# Patient Record
Sex: FEMALE | Race: WHITE
[De-identification: names, ages, dates, MRNs, and addresses within clinical notes are randomized per-mention and may not be internally consistent; named-entity substitution may affect disease eponyms.]

---

## 2017-03-23 ENCOUNTER — HOSPITAL ENCOUNTER (EMERGENCY)
Dept: HOSPITAL 49 - FER | Age: 37
Discharge: HOME | End: 2017-03-23
Payer: COMMERCIAL

## 2017-03-23 DIAGNOSIS — Z88.8: ICD-10-CM

## 2017-03-23 DIAGNOSIS — F32.9: ICD-10-CM

## 2017-03-23 DIAGNOSIS — F17.210: ICD-10-CM

## 2017-03-23 DIAGNOSIS — Z79.899: ICD-10-CM

## 2017-03-23 DIAGNOSIS — F41.9: ICD-10-CM

## 2017-03-23 DIAGNOSIS — N83.202: ICD-10-CM

## 2017-03-23 DIAGNOSIS — J40: Primary | ICD-10-CM

## 2017-03-23 DIAGNOSIS — Z88.2: ICD-10-CM

## 2017-03-23 DIAGNOSIS — N39.0: ICD-10-CM

## 2017-03-23 DIAGNOSIS — Z88.0: ICD-10-CM

## 2017-03-23 LAB
ALBUMIN SERPL-MCNC: 3.9 G/DL (ref 3.5–5)
ALKALINE PHOSHATASE: 58 U/L (ref 42–98)
ALT SERPL-CCNC: 12 U/L (ref 2–31)
AST: 14 U/L (ref 0–31)
BACTERIA: (no result)
BASOPHIL: 0.8 % (ref 0–2)
BILIRUBIN - TOTAL: 0.2 MG/DL (ref 0.1–1)
BILIRUBIN: NEGATIVE MG/DL
BLOOD: (no result) ERY/UL
BUN SERPL-MCNC: 8 MG/DL (ref 6–25)
BUN/CREAT RATIO (CALC): 10 (ref 12.1–20.1)
CHLORIDE: 99 MMOL/L (ref 98–107)
CLARITY UR: CLEAR
CO2 (BICARBONATE): 24 MMOL/L (ref 23–33)
COLOR: YELLOW
CREATININE: 0.8 MG/DL (ref 0.5–1)
EOSINOPHIL: 0.5 % (ref 0–5)
GLOBULIN (CALCULATION): 3.1 G/DL (ref 2.2–4.2)
GLUCOSE (U): NORMAL MG/DL
GLUCOSE SERPL-MCNC: 121 MG/DL (ref 70–105)
HCT: 44.2 % (ref 37–47)
HGB BLD-MCNC: 15.1 G/DL (ref 12.5–16)
KETONE (U): NEGATIVE MG/DL
LEUKOCYTES: NEGATIVE LEU/UL
LYMPHOCYTE: 21.3 % (ref 15–48)
MCH RBC QN AUTO: 31.6 PG (ref 25–31)
MCHC RBC AUTO-ENTMCNC: 34.2 G/DL (ref 32–36)
MCV: 92.5 FL (ref 78–100)
MONOCYTE: 16.8 % (ref 0–12)
MPV: 9.3 FL (ref 6–9.5)
MUCOUS: (no result)
NEUTROPHIL: 60.6 % (ref 41–80)
NITRITE: NEGATIVE MG/DL
PLT: 275 K/UL (ref 150–400)
POTASSIUM: 3.9 MMOL/L (ref 3.5–5.1)
PROTEIN: NEGATIVE MG/DL
RBC MORPHOLOGY: NORMAL
RBC: 4.78 M/UL (ref 4.2–5.4)
RDW: 12.3 % (ref 11.5–14)
SPECIFIC GRAVITY: 1.02 (ref 1–1.03)
TOTAL PROTEIN: 7 G/DL (ref 6.4–8.3)
URINARY RBC: (no result)
UROBILINOGEN: 0.2 MG/DL (ref 0.2–1)
WBC: 6.6 K/UL (ref 4–10.5)

## 2017-03-26 ENCOUNTER — HOSPITAL ENCOUNTER (EMERGENCY)
Dept: HOSPITAL 49 - FER | Age: 37
Discharge: HOME | End: 2017-03-26
Payer: COMMERCIAL

## 2017-03-26 DIAGNOSIS — Z88.2: ICD-10-CM

## 2017-03-26 DIAGNOSIS — I10: ICD-10-CM

## 2017-03-26 DIAGNOSIS — Z88.0: ICD-10-CM

## 2017-03-26 DIAGNOSIS — J20.9: Primary | ICD-10-CM

## 2017-03-26 DIAGNOSIS — Z79.899: ICD-10-CM

## 2017-03-26 DIAGNOSIS — Z87.891: ICD-10-CM

## 2017-03-26 DIAGNOSIS — Z88.6: ICD-10-CM

## 2017-05-14 ENCOUNTER — HOSPITAL ENCOUNTER (EMERGENCY)
Dept: HOSPITAL 49 - FER | Age: 37
Discharge: HOME | End: 2017-05-14
Payer: COMMERCIAL

## 2017-05-14 DIAGNOSIS — R10.2: Primary | ICD-10-CM

## 2017-05-14 DIAGNOSIS — Z88.0: ICD-10-CM

## 2017-05-14 DIAGNOSIS — Z87.42: ICD-10-CM

## 2017-05-14 DIAGNOSIS — R10.32: ICD-10-CM

## 2017-05-14 DIAGNOSIS — I10: ICD-10-CM

## 2017-05-14 DIAGNOSIS — Z87.442: ICD-10-CM

## 2017-05-14 DIAGNOSIS — Z88.2: ICD-10-CM

## 2017-05-14 DIAGNOSIS — R11.0: ICD-10-CM

## 2017-05-14 DIAGNOSIS — F17.210: ICD-10-CM

## 2017-05-14 DIAGNOSIS — Z88.8: ICD-10-CM

## 2017-05-14 LAB
ALBUMIN SERPL-MCNC: 4.2 G/DL (ref 3.5–5)
ALKALINE PHOSHATASE: 57 U/L (ref 42–98)
ALT SERPL-CCNC: 11 U/L (ref 2–31)
AST: 12 U/L (ref 0–31)
BASOPHIL: 0.4 % (ref 0–2)
BILIRUBIN - TOTAL: 0.2 MG/DL (ref 0.1–1)
BILIRUBIN: NEGATIVE MG/DL
BLOOD: NEGATIVE ERY/UL
BUN SERPL-MCNC: 12 MG/DL (ref 6–25)
BUN/CREAT RATIO (CALC): 15 (ref 12.1–20.1)
CHLORIDE: 98 MMOL/L (ref 98–107)
CLARITY UR: CLEAR
CO2 (BICARBONATE): 25 MMOL/L (ref 23–33)
COLOR: YELLOW
CREATININE: 0.8 MG/DL (ref 0.5–1)
EOSINOPHIL: 2.2 % (ref 0–5)
GLOBULIN (CALCULATION): 2.5 G/DL (ref 2.2–4.2)
GLUCOSE (U): NORMAL MG/DL
GLUCOSE SERPL-MCNC: 110 MG/DL (ref 70–105)
HCT: 41.8 % (ref 37–47)
HGB BLD-MCNC: 14.4 G/DL (ref 12.5–16)
KETONE (U): NEGATIVE MG/DL
LEUKOCYTES: NEGATIVE LEU/UL
LYMPHOCYTE: 27.5 % (ref 15–48)
MCH RBC QN AUTO: 31.1 PG (ref 25–31)
MCHC RBC AUTO-ENTMCNC: 34.4 G/DL (ref 32–36)
MCV: 90.3 FL (ref 78–100)
MONOCYTE: 7.9 % (ref 0–12)
MPV: 9.4 FL (ref 6–9.5)
NEUTROPHIL: 62 % (ref 41–80)
NITRITE: NEGATIVE MG/DL
PLT: 341 K/UL (ref 150–400)
POTASSIUM: 4 MMOL/L (ref 3.5–5.1)
PROTEIN: NEGATIVE MG/DL
RBC MORPHOLOGY: NORMAL
RBC: 4.63 M/UL (ref 4.2–5.4)
RDW: 12.8 % (ref 11.5–14)
SPECIFIC GRAVITY: <=1.005 (ref 1–1.03)
TOTAL PROTEIN: 6.7 G/DL (ref 6.4–8.3)
UROBILINOGEN: 0.2 MG/DL (ref 0.2–1)
WBC: 12.1 K/UL (ref 4–10.5)

## 2017-05-17 LAB
BASOPHILS # BLD AUTO: 0.03 10*3/MM3 (ref 0–0.2)
BASOPHILS NFR BLD AUTO: 0.2 % (ref 0–1.5)
BILIRUB UR QL STRIP: NEGATIVE
CLARITY UR: CLEAR
COLOR UR: YELLOW
DEPRECATED RDW RBC AUTO: 42.9 FL (ref 37–54)
EOSINOPHIL # BLD AUTO: 0.21 10*3/MM3 (ref 0–0.7)
EOSINOPHIL NFR BLD AUTO: 1.5 % (ref 0.3–6.2)
ERYTHROCYTE [DISTWIDTH] IN BLOOD BY AUTOMATED COUNT: 12.5 % (ref 11.7–13)
GLUCOSE UR STRIP-MCNC: NEGATIVE MG/DL
HCT VFR BLD AUTO: 44.1 % (ref 35.6–45.5)
HGB BLD-MCNC: 15.2 G/DL (ref 11.9–15.5)
HGB UR QL STRIP.AUTO: NEGATIVE
IMM GRANULOCYTES # BLD: 0.02 10*3/MM3 (ref 0–0.03)
IMM GRANULOCYTES NFR BLD: 0.1 % (ref 0–0.5)
KETONES UR QL STRIP: NEGATIVE
LEUKOCYTE ESTERASE UR QL STRIP.AUTO: NEGATIVE
LYMPHOCYTES # BLD AUTO: 3.85 10*3/MM3 (ref 0.9–4.8)
LYMPHOCYTES NFR BLD AUTO: 28.1 % (ref 19.6–45.3)
MCH RBC QN AUTO: 32.3 PG (ref 26.9–32)
MCHC RBC AUTO-ENTMCNC: 34.5 G/DL (ref 32.4–36.3)
MCV RBC AUTO: 93.6 FL (ref 80.5–98.2)
MONOCYTES # BLD AUTO: 0.76 10*3/MM3 (ref 0.2–1.2)
MONOCYTES NFR BLD AUTO: 5.5 % (ref 5–12)
NEUTROPHILS # BLD AUTO: 8.83 10*3/MM3 (ref 1.9–8.1)
NEUTROPHILS NFR BLD AUTO: 64.6 % (ref 42.7–76)
NITRITE UR QL STRIP: NEGATIVE
PH UR STRIP.AUTO: 7 [PH] (ref 5–8)
PLATELET # BLD AUTO: 294 10*3/MM3 (ref 140–500)
PMV BLD AUTO: 9.5 FL (ref 6–12)
PROT UR QL STRIP: NEGATIVE
RBC # BLD AUTO: 4.71 10*6/MM3 (ref 3.9–5.2)
SP GR UR STRIP: 1.01 (ref 1–1.03)
UROBILINOGEN UR QL STRIP: NORMAL
WBC NRBC COR # BLD: 13.7 10*3/MM3 (ref 4.5–10.7)

## 2017-05-17 PROCEDURE — 81003 URINALYSIS AUTO W/O SCOPE: CPT | Performed by: EMERGENCY MEDICINE

## 2017-05-17 PROCEDURE — 84703 CHORIONIC GONADOTROPIN ASSAY: CPT | Performed by: EMERGENCY MEDICINE

## 2017-05-17 PROCEDURE — 83690 ASSAY OF LIPASE: CPT | Performed by: EMERGENCY MEDICINE

## 2017-05-17 PROCEDURE — 99211 OFF/OP EST MAY X REQ PHY/QHP: CPT

## 2017-05-17 PROCEDURE — 85025 COMPLETE CBC W/AUTO DIFF WBC: CPT | Performed by: EMERGENCY MEDICINE

## 2017-05-17 PROCEDURE — 80053 COMPREHEN METABOLIC PANEL: CPT | Performed by: EMERGENCY MEDICINE

## 2017-05-17 RX ORDER — LISINOPRIL AND HYDROCHLOROTHIAZIDE 12.5; 1 MG/1; MG/1
1 TABLET ORAL DAILY
COMMUNITY
End: 2018-01-05

## 2017-05-17 RX ORDER — BUSPIRONE HYDROCHLORIDE 15 MG/1
15 TABLET ORAL 3 TIMES DAILY
COMMUNITY

## 2017-05-17 RX ORDER — PROMETHAZINE HYDROCHLORIDE 12.5 MG/1
12.5 TABLET ORAL EVERY 6 HOURS PRN
COMMUNITY
End: 2017-07-26 | Stop reason: HOSPADM

## 2017-05-17 RX ORDER — LORAZEPAM 0.5 MG/1
0.5 TABLET ORAL EVERY 8 HOURS PRN
COMMUNITY

## 2017-05-17 RX ORDER — SODIUM CHLORIDE 0.9 % (FLUSH) 0.9 %
10 SYRINGE (ML) INJECTION AS NEEDED
Status: DISCONTINUED | OUTPATIENT
Start: 2017-05-17 | End: 2017-05-18 | Stop reason: HOSPADM

## 2017-05-18 ENCOUNTER — HOSPITAL ENCOUNTER (EMERGENCY)
Facility: HOSPITAL | Age: 37
Discharge: LEFT BEFORE DISCHARGE | End: 2017-05-18
Attending: EMERGENCY MEDICINE

## 2017-05-18 ENCOUNTER — APPOINTMENT (OUTPATIENT)
Dept: CT IMAGING | Facility: HOSPITAL | Age: 37
End: 2017-05-18

## 2017-05-18 VITALS
RESPIRATION RATE: 18 BRPM | WEIGHT: 210 LBS | DIASTOLIC BLOOD PRESSURE: 78 MMHG | HEIGHT: 63 IN | SYSTOLIC BLOOD PRESSURE: 121 MMHG | HEART RATE: 81 BPM | TEMPERATURE: 98.5 F | BODY MASS INDEX: 37.21 KG/M2 | OXYGEN SATURATION: 99 %

## 2017-05-18 LAB
ALBUMIN SERPL-MCNC: 4.1 G/DL (ref 3.5–5.2)
ALBUMIN/GLOB SERPL: 1.3 G/DL
ALP SERPL-CCNC: 56 U/L (ref 39–117)
ALT SERPL W P-5'-P-CCNC: 12 U/L (ref 1–33)
ANION GAP SERPL CALCULATED.3IONS-SCNC: 10 MMOL/L
AST SERPL-CCNC: 15 U/L (ref 1–32)
BILIRUB SERPL-MCNC: 0.2 MG/DL (ref 0.1–1.2)
BUN BLD-MCNC: 12 MG/DL (ref 6–20)
BUN/CREAT SERPL: 14.8 (ref 7–25)
CALCIUM SPEC-SCNC: 9.1 MG/DL (ref 8.6–10.5)
CHLORIDE SERPL-SCNC: 98 MMOL/L (ref 98–107)
CO2 SERPL-SCNC: 27 MMOL/L (ref 22–29)
CREAT BLD-MCNC: 0.81 MG/DL (ref 0.57–1)
GFR SERPL CREATININE-BSD FRML MDRD: 79 ML/MIN/1.73
GLOBULIN UR ELPH-MCNC: 3.2 GM/DL
GLUCOSE BLD-MCNC: 106 MG/DL (ref 65–99)
HCG SERPL QL: NEGATIVE
HOLD SPECIMEN: NORMAL
HOLD SPECIMEN: NORMAL
LIPASE SERPL-CCNC: 19 U/L (ref 13–60)
POTASSIUM BLD-SCNC: 4.5 MMOL/L (ref 3.5–5.2)
PROT SERPL-MCNC: 7.3 G/DL (ref 6–8.5)
SODIUM BLD-SCNC: 135 MMOL/L (ref 136–145)
WHOLE BLOOD HOLD SPECIMEN: NORMAL
WHOLE BLOOD HOLD SPECIMEN: NORMAL

## 2017-05-18 PROCEDURE — 74177 CT ABD & PELVIS W/CONTRAST: CPT

## 2017-05-18 PROCEDURE — 0 IOPAMIDOL 61 % SOLUTION: Performed by: EMERGENCY MEDICINE

## 2017-05-18 RX ADMIN — IOPAMIDOL 85 ML: 612 INJECTION, SOLUTION INTRAVENOUS at 00:52

## 2017-05-19 ENCOUNTER — HOSPITAL ENCOUNTER (EMERGENCY)
Dept: HOSPITAL 49 - FER | Age: 37
Discharge: HOME | End: 2017-05-19
Payer: COMMERCIAL

## 2017-05-19 DIAGNOSIS — Z88.8: ICD-10-CM

## 2017-05-19 DIAGNOSIS — Z88.2: ICD-10-CM

## 2017-05-19 DIAGNOSIS — R07.81: ICD-10-CM

## 2017-05-19 DIAGNOSIS — F17.210: ICD-10-CM

## 2017-05-19 DIAGNOSIS — I10: ICD-10-CM

## 2017-05-19 DIAGNOSIS — R10.12: Primary | ICD-10-CM

## 2017-05-19 DIAGNOSIS — Z88.0: ICD-10-CM

## 2017-05-19 DIAGNOSIS — R10.32: ICD-10-CM

## 2017-05-19 DIAGNOSIS — Z79.899: ICD-10-CM

## 2017-06-05 ENCOUNTER — OFFICE VISIT (OUTPATIENT)
Dept: GASTROENTEROLOGY | Facility: CLINIC | Age: 37
End: 2017-06-05

## 2017-06-05 ENCOUNTER — TELEPHONE (OUTPATIENT)
Dept: GASTROENTEROLOGY | Facility: CLINIC | Age: 37
End: 2017-06-05

## 2017-06-05 VITALS
WEIGHT: 204.2 LBS | TEMPERATURE: 98.6 F | DIASTOLIC BLOOD PRESSURE: 82 MMHG | HEIGHT: 63 IN | BODY MASS INDEX: 36.18 KG/M2 | SYSTOLIC BLOOD PRESSURE: 138 MMHG

## 2017-06-05 DIAGNOSIS — R10.12 LEFT UPPER QUADRANT PAIN: Primary | ICD-10-CM

## 2017-06-05 LAB
BASOPHILS # BLD AUTO: 0.05 10*3/MM3 (ref 0–0.2)
BASOPHILS NFR BLD AUTO: 0.3 % (ref 0–1.5)
CRP SERPL-MCNC: 0.86 MG/DL (ref 0–0.5)
EOSINOPHIL # BLD AUTO: 0.38 10*3/MM3 (ref 0–0.7)
EOSINOPHIL NFR BLD AUTO: 2.1 % (ref 0.3–6.2)
ERYTHROCYTE [DISTWIDTH] IN BLOOD BY AUTOMATED COUNT: 13 % (ref 11.7–13)
ERYTHROCYTE [SEDIMENTATION RATE] IN BLOOD BY WESTERGREN METHOD: 4 MM/HR (ref 0–20)
HCT VFR BLD AUTO: 48.4 % (ref 35.6–45.5)
HGB BLD-MCNC: 16.4 G/DL (ref 11.9–15.5)
IMM GRANULOCYTES # BLD: 0.04 10*3/MM3 (ref 0–0.03)
IMM GRANULOCYTES NFR BLD: 0.2 % (ref 0–0.5)
LYMPHOCYTES # BLD AUTO: 3.29 10*3/MM3 (ref 0.9–4.8)
LYMPHOCYTES NFR BLD AUTO: 17.9 % (ref 19.6–45.3)
MCH RBC QN AUTO: 31.8 PG (ref 26.9–32)
MCHC RBC AUTO-ENTMCNC: 33.9 G/DL (ref 32.4–36.3)
MCV RBC AUTO: 93.8 FL (ref 80.5–98.2)
MONOCYTES # BLD AUTO: 1.01 10*3/MM3 (ref 0.2–1.2)
MONOCYTES NFR BLD AUTO: 5.5 % (ref 5–12)
NEUTROPHILS # BLD AUTO: 13.65 10*3/MM3 (ref 1.9–8.1)
NEUTROPHILS NFR BLD AUTO: 74 % (ref 42.7–76)
PLATELET # BLD AUTO: 400 10*3/MM3 (ref 140–500)
RBC # BLD AUTO: 5.16 10*6/MM3 (ref 3.9–5.2)
WBC # BLD AUTO: 18.42 10*3/MM3 (ref 4.5–10.7)

## 2017-06-05 PROCEDURE — 99204 OFFICE O/P NEW MOD 45 MIN: CPT | Performed by: INTERNAL MEDICINE

## 2017-06-05 RX ORDER — HYOSCYAMINE SULFATE 0.125 MG
0.12 TABLET ORAL EVERY 6 HOURS PRN
Qty: 120 TABLET | Refills: 5 | Status: SHIPPED | OUTPATIENT
Start: 2017-06-05 | End: 2017-07-26 | Stop reason: HOSPADM

## 2017-06-05 RX ORDER — AMOXICILLIN AND CLAVULANATE POTASSIUM 875; 125 MG/1; MG/1
TABLET, FILM COATED ORAL
COMMUNITY
Start: 2017-05-27 | End: 2017-07-06

## 2017-06-05 NOTE — TELEPHONE ENCOUNTER
----- Message from Cherrie Lazaro MD sent at 6/5/2017 11:59 AM EDT -----  She had ER visit to [facility] recently with xrays and scans-- can we please get the records, thanks

## 2017-06-05 NOTE — PATIENT INSTRUCTIONS
Schedule the GI x ray test    I will get records from [facility]    Start the hyoscyamine as directed    For any additional questions, concerns or changes to your condition after today's office visit please contact the office at [phone].

## 2017-06-05 NOTE — PROGRESS NOTES
"Chief Complaint   Patient presents with   • Abdominal Pain   • Vomiting     x 6 weeks   • Nausea       Subjective     HPI    Terra Deutsch is a 36 y.o. female with a past medical history noted below who presents for evaluation of abdominal pain.  Pain located At the left side of her abdomen diffusely with a more focused area in the mid abdomen.  Present for about 1 month.  She had just had a D and C for heavy periods.  Pain is constant, fluctuating in severity in waves up to a 10/10.  Described as sharp.  Worse with movement.  No change with eating.  No change with BMs.  Pain can make her sick to her stomach.  BMs alternating between diarrhea and constipation.  No blood in her stool.  No problems urinating.    Had CT imaging 5/18, notable for L ovarian cyst [facility].  Labs notable only for a mildly elevated white count at 11, the remainder of her CBC and her CMP were normal.  Also seen at [facility] and had CT scans and imaging, she thinks this was May 19th and reports a \"knot\" on her left side and she was told that she needed an upper GI.    She reports that she has seen her gynecologist since the D and C.  She reports that her pelvic exam was reportedly normal.  However she does endorse some tenderness in her uterine area.  She is to have a total hysterectomy later in July.  She denies fevers or chills.    Her weight is stable.  Eating soups, avoiding spicy foods.      Has had a tubal, no other abdominal surgeries.  No family history of GI malignancy.  Smoking 5 cigarettes per day.  No ETOH.  Stay at home mother.        Past Medical History:   Diagnosis Date   • Abnormal CT of the abdomen 05/18/2017    1.8cm ovarian cyst   • Anxiety    • GERD (gastroesophageal reflux disease)    • Hypertension    • Ovarian cyst          Current Outpatient Prescriptions:   •  amoxicillin-clavulanate (AUGMENTIN) 875-125 MG per tablet, , Disp: , Rfl:   •  busPIRone (BUSPAR) 15 MG tablet, Take 15 mg by mouth 2 (Two) Times a Day., Disp: , " Rfl:   •  lisinopril-hydrochlorothiazide (PRINZIDE,ZESTORETIC) 10-12.5 MG per tablet, Take 1 tablet by mouth Daily., Disp: , Rfl:   •  LORazepam (ATIVAN) 0.5 MG tablet, Take 0.5 mg by mouth As Needed for Anxiety (1-2 tabs TID)., Disp: , Rfl:   •  PROAIR  (90 BASE) MCG/ACT inhaler, , Disp: , Rfl:   •  promethazine (PHENERGAN) 12.5 MG tablet, Take 12.5 mg by mouth Every 6 (Six) Hours As Needed for Nausea or Vomiting., Disp: , Rfl:   •  hyoscyamine (ANASPAZ,LEVSIN) 0.125 MG tablet, Take 1 tablet by mouth Every 6 (Six) Hours As Needed for Cramping., Disp: 120 tablet, Rfl: 5    Allergies   Allergen Reactions   • Bactrim [Sulfamethoxazole-Trimethoprim]    • Penicillins        Social History     Social History   • Marital status:      Spouse name: N/A   • Number of children: N/A   • Years of education: N/A     Occupational History   • Not on file.     Social History Main Topics   • Smoking status: Current Every Day Smoker     Packs/day: 0.25     Years: 14.00     Types: Cigarettes     Start date: 2000   • Smokeless tobacco: Not on file   • Alcohol use No   • Drug use: No   • Sexual activity: Yes     Partners: Male     Other Topics Concern   • Not on file     Social History Narrative       No family history on file.    Review of Systems   Constitutional: Negative for activity change, appetite change and fatigue.   HENT: Negative for sore throat and trouble swallowing.    Respiratory: Negative.    Cardiovascular: Negative.    Gastrointestinal: Positive for abdominal pain and nausea. Negative for abdominal distention, blood in stool and vomiting.   Endocrine: Negative for cold intolerance and heat intolerance.   Genitourinary: Positive for menstrual problem. Negative for difficulty urinating, dysuria and frequency.   Musculoskeletal: Negative for arthralgias, back pain and myalgias.   Skin: Negative.    Hematological: Negative for adenopathy. Does not bruise/bleed easily.   All other systems reviewed and are  negative.      Objective     Vitals:    06/05/17 1019   BP: 138/82   Temp: 98.6 °F (37 °C)     Last 2 weights    06/05/17  1019   Weight: 204 lb 3.2 oz (92.6 kg)     Body mass index is 36.17 kg/(m^2).    Physical Exam   Constitutional: She is oriented to person, place, and time. She appears well-developed and well-nourished. No distress.   HENT:   Head: Normocephalic and atraumatic.   Right Ear: External ear normal.   Left Ear: External ear normal.   Nose: Nose normal.   Mouth/Throat: Oropharynx is clear and moist.   Eyes: Conjunctivae and EOM are normal. Right eye exhibits no discharge. Left eye exhibits no discharge. No scleral icterus.   Neck: Normal range of motion. Neck supple. No thyromegaly present.   No supraclavicular adenopathy   Cardiovascular: Normal rate, regular rhythm, normal heart sounds and intact distal pulses.  Exam reveals no gallop.    No murmur heard.  No lower extremity edema   Pulmonary/Chest: Effort normal and breath sounds normal. No respiratory distress. She has no wheezes.   Abdominal: Soft. Normal appearance and bowel sounds are normal. She exhibits no distension and no mass. There is no hepatosplenomegaly. There is tenderness. There is no rigidity, no rebound and no guarding. No hernia.   Diffuse tenderness on the left abdomen no rebound or guarding   Genitourinary:   Genitourinary Comments: Rectal exam deferred   Musculoskeletal: Normal range of motion. She exhibits no edema or tenderness.   No atrophy of upper or lower extremities.  Normal digits and nails of both hands.   Lymphadenopathy:     She has no cervical adenopathy.   Neurological: She is alert and oriented to person, place, and time. She displays no atrophy. Coordination normal.   Skin: Skin is warm and dry. No rash noted. She is not diaphoretic. No erythema.   Psychiatric: She has a normal mood and affect. Her behavior is normal. Judgment and thought content normal.   Vitals reviewed.      WBC   Date Value Ref Range Status    05/17/2017 13.70 (H) 4.50 - 10.70 10*3/mm3 Final     RBC   Date Value Ref Range Status   05/17/2017 4.71 3.90 - 5.20 10*6/mm3 Final     Hemoglobin   Date Value Ref Range Status   05/17/2017 15.2 11.9 - 15.5 g/dL Final     Hematocrit   Date Value Ref Range Status   05/17/2017 44.1 35.6 - 45.5 % Final     MCV   Date Value Ref Range Status   05/17/2017 93.6 80.5 - 98.2 fL Final     MCH   Date Value Ref Range Status   05/17/2017 32.3 (H) 26.9 - 32.0 pg Final     MCHC   Date Value Ref Range Status   05/17/2017 34.5 32.4 - 36.3 g/dL Final     RDW   Date Value Ref Range Status   05/17/2017 12.5 11.7 - 13.0 % Final     RDW-SD   Date Value Ref Range Status   05/17/2017 42.9 37.0 - 54.0 fl Final     MPV   Date Value Ref Range Status   05/17/2017 9.5 6.0 - 12.0 fL Final     Platelets   Date Value Ref Range Status   05/17/2017 294 140 - 500 10*3/mm3 Final     Neutrophil %   Date Value Ref Range Status   05/17/2017 64.6 42.7 - 76.0 % Final     Lymphocyte %   Date Value Ref Range Status   05/17/2017 28.1 19.6 - 45.3 % Final     Monocyte %   Date Value Ref Range Status   05/17/2017 5.5 5.0 - 12.0 % Final     Eosinophil %   Date Value Ref Range Status   05/17/2017 1.5 0.3 - 6.2 % Final     Basophil %   Date Value Ref Range Status   05/17/2017 0.2 0.0 - 1.5 % Final     Immature Grans %   Date Value Ref Range Status   05/17/2017 0.1 0.0 - 0.5 % Final     Neutrophils, Absolute   Date Value Ref Range Status   05/17/2017 8.83 (H) 1.90 - 8.10 10*3/mm3 Final     Lymphocytes, Absolute   Date Value Ref Range Status   05/17/2017 3.85 0.90 - 4.80 10*3/mm3 Final     Monocytes, Absolute   Date Value Ref Range Status   05/17/2017 0.76 0.20 - 1.20 10*3/mm3 Final     Eosinophils, Absolute   Date Value Ref Range Status   05/17/2017 0.21 0.00 - 0.70 10*3/mm3 Final     Basophils, Absolute   Date Value Ref Range Status   05/17/2017 0.03 0.00 - 0.20 10*3/mm3 Final     Immature Grans, Absolute   Date Value Ref Range Status   05/17/2017 0.02 0.00 -  0.03 10*3/mm3 Final       Glucose   Date Value Ref Range Status   05/17/2017 106 (H) 65 - 99 mg/dL Final     Sodium   Date Value Ref Range Status   05/17/2017 135 (L) 136 - 145 mmol/L Final     Potassium   Date Value Ref Range Status   05/17/2017 4.5 3.5 - 5.2 mmol/L Final     CO2   Date Value Ref Range Status   05/17/2017 27.0 22.0 - 29.0 mmol/L Final     Chloride   Date Value Ref Range Status   05/17/2017 98 98 - 107 mmol/L Final     Anion Gap   Date Value Ref Range Status   05/17/2017 10.0 mmol/L Final     Creatinine   Date Value Ref Range Status   05/17/2017 0.81 0.57 - 1.00 mg/dL Final     BUN   Date Value Ref Range Status   05/17/2017 12 6 - 20 mg/dL Final     BUN/Creatinine Ratio   Date Value Ref Range Status   05/17/2017 14.8 7.0 - 25.0 Final     Calcium   Date Value Ref Range Status   05/17/2017 9.1 8.6 - 10.5 mg/dL Final     eGFR Non  Amer   Date Value Ref Range Status   05/17/2017 79 >60 mL/min/1.73 Final     Alkaline Phosphatase   Date Value Ref Range Status   05/17/2017 56 39 - 117 U/L Final     Total Protein   Date Value Ref Range Status   05/17/2017 7.3 6.0 - 8.5 g/dL Final     ALT (SGPT)   Date Value Ref Range Status   05/17/2017 12 1 - 33 U/L Final     AST (SGOT)   Date Value Ref Range Status   05/17/2017 15 1 - 32 U/L Final     Total Bilirubin   Date Value Ref Range Status   05/17/2017 0.2 0.1 - 1.2 mg/dL Final     Albumin   Date Value Ref Range Status   05/17/2017 4.10 3.50 - 5.20 g/dL Final     Globulin   Date Value Ref Range Status   05/17/2017 3.2 gm/dL Final     A/G Ratio   Date Value Ref Range Status   05/17/2017 1.3 g/dL Final         Imaging Results (last 7 days)     ** No results found for the last 168 hours. **            No notes on file    Assessment/Plan    1. Left abdominal pain: Present for about a month.  Interestingly this is been following her D&C.  Normal imaging of the GI tract, she did have a left-sided ovarian cyst.  Normal blood work from May 17.  Differential of  "gynecologic pain versus cramping versus colitis versus other    Plan:  -CBC today to make sure that her white count has resolved  -CRP and ESR to check inflammation  -Trial of hyoscyamine to see if this helps with her pain  -Upper GI with small bowel follow-through for further evaluation of her GI tract  -Get the records from [facility] to see what the CT imaging showed given that she is reporting the findings of a \"knot\"    Terra was seen today for abdominal pain, vomiting and nausea.    Diagnoses and all orders for this visit:    Left upper quadrant pain  -     FL Upper GI Single Contrast SBFT; Future  -     hyoscyamine (ANASPAZ,LEVSIN) 0.125 MG tablet; Take 1 tablet by mouth Every 6 (Six) Hours As Needed for Cramping.  -     CBC & Differential  -     C-reactive Protein  -     Sedimentation Rate        I have discussed the above plan with the patient.  They verbalize understanding and are in agreement with the plan.  They have been advised to contact the office for any questions, concerns, or changes related to their health.    Dictated utilizing Dragon dictation  "

## 2017-06-05 NOTE — TELEPHONE ENCOUNTER
Called [facility] at [phone] and on vm left message requesting records from recent ER records be faxed to [phone].

## 2017-06-06 NOTE — PROGRESS NOTES
And spoke with her regarding the increase in her white blood count as well as elevated CRP.  She has been on Augmentin for upper respiratory infection for the past 2 weeks.  She reported having some nausea and symptoms of gastroenteritis starting yesterday.  She denies any fevers chills.  She denies feeling worse.    I have advised her to follow-up with her primary care physician, Dr. Cain later this week for repeat CBC to make sure that her WBC returns to normal.  If she feels worse I have advised her to go to urgent care versus ER.    Will check in with her later this week.  If she still is having diarrhea, then I think stool testing for C. difficile is warranted

## 2017-06-07 ENCOUNTER — TELEPHONE (OUTPATIENT)
Dept: GASTROENTEROLOGY | Facility: CLINIC | Age: 37
End: 2017-06-07

## 2017-06-07 NOTE — TELEPHONE ENCOUNTER
----- Message from Cherrie Lazaro MD sent at 6/6/2017  5:09 PM EDT -----  And spoke with her regarding the increase in her white blood count as well as elevated CRP.  She has been on Augmentin for upper respiratory infection for the past 2 weeks.  She reported having some nausea and symptoms of gastroenteritis starting yesterday.  She denies any fevers chills.  She denies feeling worse.    I have advised her to follow-up with her primary care physician, Dr. Cain later this week for repeat CBC to make sure that her WBC returns to normal.  If she feels worse I have advised her to go to urgent care versus ER.    Will check in with her later this week.  If she still is having diarrhea, then I think stool testing for C. difficile is warranted

## 2017-07-06 ENCOUNTER — OFFICE VISIT (OUTPATIENT)
Dept: GASTROENTEROLOGY | Facility: CLINIC | Age: 37
End: 2017-07-06

## 2017-07-06 VITALS
SYSTOLIC BLOOD PRESSURE: 136 MMHG | DIASTOLIC BLOOD PRESSURE: 82 MMHG | BODY MASS INDEX: 36.86 KG/M2 | WEIGHT: 208 LBS | HEIGHT: 63 IN

## 2017-07-06 DIAGNOSIS — D72.829 LEUKOCYTOSIS, UNSPECIFIED TYPE: ICD-10-CM

## 2017-07-06 DIAGNOSIS — R11.0 NAUSEA: ICD-10-CM

## 2017-07-06 DIAGNOSIS — R10.30 LOWER ABDOMINAL PAIN: Primary | ICD-10-CM

## 2017-07-06 LAB
BASOPHILS # BLD AUTO: 0.04 10*3/MM3 (ref 0–0.2)
BASOPHILS NFR BLD AUTO: 0.3 % (ref 0–1.5)
EOSINOPHIL # BLD AUTO: 0.11 10*3/MM3 (ref 0–0.7)
EOSINOPHIL NFR BLD AUTO: 0.8 % (ref 0.3–6.2)
ERYTHROCYTE [DISTWIDTH] IN BLOOD BY AUTOMATED COUNT: 13.3 % (ref 11.7–13)
HCT VFR BLD AUTO: 44.3 % (ref 35.6–45.5)
HGB BLD-MCNC: 14.5 G/DL (ref 11.9–15.5)
IMM GRANULOCYTES # BLD: 0.02 10*3/MM3 (ref 0–0.03)
IMM GRANULOCYTES NFR BLD: 0.1 % (ref 0–0.5)
LYMPHOCYTES # BLD AUTO: 2.45 10*3/MM3 (ref 0.9–4.8)
LYMPHOCYTES NFR BLD AUTO: 18.4 % (ref 19.6–45.3)
MCH RBC QN AUTO: 31.3 PG (ref 26.9–32)
MCHC RBC AUTO-ENTMCNC: 32.7 G/DL (ref 32.4–36.3)
MCV RBC AUTO: 95.7 FL (ref 80.5–98.2)
MONOCYTES # BLD AUTO: 0.55 10*3/MM3 (ref 0.2–1.2)
MONOCYTES NFR BLD AUTO: 4.1 % (ref 5–12)
NEUTROPHILS # BLD AUTO: 10.18 10*3/MM3 (ref 1.9–8.1)
NEUTROPHILS NFR BLD AUTO: 76.3 % (ref 42.7–76)
PLATELET # BLD AUTO: 370 10*3/MM3 (ref 140–500)
RBC # BLD AUTO: 4.63 10*6/MM3 (ref 3.9–5.2)
WBC # BLD AUTO: 13.35 10*3/MM3 (ref 4.5–10.7)

## 2017-07-06 PROCEDURE — 99214 OFFICE O/P EST MOD 30 MIN: CPT | Performed by: INTERNAL MEDICINE

## 2017-07-06 RX ORDER — SODIUM CHLORIDE 0.9 % (FLUSH) 0.9 %
1-10 SYRINGE (ML) INJECTION AS NEEDED
Status: CANCELLED | OUTPATIENT
Start: 2017-07-06

## 2017-07-06 RX ORDER — SODIUM CHLORIDE, SODIUM LACTATE, POTASSIUM CHLORIDE, CALCIUM CHLORIDE 600; 310; 30; 20 MG/100ML; MG/100ML; MG/100ML; MG/100ML
30 INJECTION, SOLUTION INTRAVENOUS CONTINUOUS
Status: CANCELLED | OUTPATIENT
Start: 2017-07-06

## 2017-07-06 NOTE — PATIENT INSTRUCTIONS
Schedule the scope tests    Labs today    Follow up with Dr Cain re: infectious symptoms    For any additional questions, concerns or changes to your condition after today's office visit please contact the office at [phone].

## 2017-07-06 NOTE — PROGRESS NOTES
Chief Complaint   Patient presents with   • Follow-up     Subjective     HPI  Terra Deutsch is a 36 y.o. female who presents for follow up of lower abdominal pain following a D and C.  May CT imaging notable for a left ovarian cyst.  I started her on Levsin for symptoms. She reports that the left lower abdominal pain persists. Occurring daily.  Intermittent.  Can be as severe as a 10/10.  Currently about a 4/10 which is tolerable to her.  Plans for hysterectomy 7/24/2017.  She has not had any further periods.  The levsin helps a bit for severe symptoms but she is concerned that the pain is persisting.    Her CBC on 6/5 was notable for a WBC of 18. She was on augmentin for URI at that time. I advised she see her pcp, which she did following that lab visit and her WBC was a bit lower.  She took another 2 weeks of antibiotics.  She denies any diarrhea.  She persists with nausea but no vomiting.  She does endorse fatigue. No UTI symptoms.   She feels that she is getting some kind of illness but cannot pinpoint the symptoms to any particular location.    She is quite upset about her symptoms and wishes to proceed with endoscopic workup for the symptoms.    Past Medical History:   Diagnosis Date   • Abnormal CT of the abdomen 05/18/2017    1.8cm ovarian cyst   • Anxiety    • GERD (gastroesophageal reflux disease)    • Hypertension    • Ovarian cyst        Social History     Social History   • Marital status:      Spouse name: N/A   • Number of children: N/A   • Years of education: N/A     Social History Main Topics   • Smoking status: Current Every Day Smoker     Packs/day: 0.25     Years: 14.00     Types: Cigarettes     Start date: 2000   • Smokeless tobacco: None   • Alcohol use No   • Drug use: No   • Sexual activity: Yes     Partners: Male     Other Topics Concern   • None     Social History Narrative         Current Outpatient Prescriptions:   •  busPIRone (BUSPAR) 15 MG tablet, Take 15 mg by mouth 2 (Two) Times  a Day., Disp: , Rfl:   •  hyoscyamine (ANASPAZ,LEVSIN) 0.125 MG tablet, Take 1 tablet by mouth Every 6 (Six) Hours As Needed for Cramping., Disp: 120 tablet, Rfl: 5  •  lisinopril-hydrochlorothiazide (PRINZIDE,ZESTORETIC) 10-12.5 MG per tablet, Take 1 tablet by mouth Daily., Disp: , Rfl:   •  LORazepam (ATIVAN) 0.5 MG tablet, Take 0.5 mg by mouth As Needed for Anxiety (1-2 tabs TID)., Disp: , Rfl:   •  PROAIR  (90 BASE) MCG/ACT inhaler, , Disp: , Rfl:   •  promethazine (PHENERGAN) 12.5 MG tablet, Take 12.5 mg by mouth Every 6 (Six) Hours As Needed for Nausea or Vomiting., Disp: , Rfl:     Review of Systems   Constitutional: Positive for fatigue. Negative for activity change and appetite change.   HENT: Negative for congestion, sore throat and trouble swallowing.    Respiratory: Negative.    Cardiovascular: Negative.    Gastrointestinal: Positive for abdominal pain and nausea. Negative for abdominal distention, blood in stool, constipation, diarrhea and vomiting.   Endocrine: Negative for cold intolerance and heat intolerance.   Genitourinary: Negative for difficulty urinating, dysuria and frequency.   Musculoskeletal: Negative for arthralgias, back pain and myalgias.   Skin: Negative.    Hematological: Negative for adenopathy. Does not bruise/bleed easily.   All other systems reviewed and are negative.      Objective   Vitals:    07/06/17 0833   BP: 136/82     Last Weight    07/06/17  0833   Weight: 208 lb (94.3 kg)     Body mass index is 36.85 kg/(m^2).      Physical Exam   Constitutional: She is oriented to person, place, and time. She appears well-developed and well-nourished. No distress.   HENT:   Head: Normocephalic and atraumatic.   Right Ear: External ear normal.   Left Ear: External ear normal.   Nose: Nose normal.   Eyes: Conjunctivae and EOM are normal. No scleral icterus.   Cardiovascular: Normal rate, regular rhythm, normal heart sounds and intact distal pulses.  Exam reveals no gallop.    No  murmur heard.  No lower extremity edema   Pulmonary/Chest: Effort normal. No respiratory distress. She has no wheezes.   Coarse breath sounds bilaterally   Abdominal: Soft. Normal appearance and bowel sounds are normal. She exhibits no distension and no mass. There is no hepatosplenomegaly. There is tenderness. There is no rigidity, no rebound and no guarding. No hernia.   llq tenderness   Genitourinary:   Genitourinary Comments: Rectal exam deferred   Musculoskeletal: Normal range of motion. She exhibits no edema or tenderness.   Normal digits and nails of both hands.  No atrophy or wasting of upper or lower extremeties   Neurological: She is alert and oriented to person, place, and time. She displays no atrophy. Coordination normal.   Skin: Skin is warm and dry. No rash noted. She is not diaphoretic. No erythema.   Psychiatric: She has a normal mood and affect. Her behavior is normal. Judgment and thought content normal.   Vitals reviewed.      WBC   Date Value Ref Range Status   06/05/2017 18.42 (H) 4.50 - 10.70 10*3/mm3 Final   05/17/2017 13.70 (H) 4.50 - 10.70 10*3/mm3 Final     RBC   Date Value Ref Range Status   06/05/2017 5.16 3.90 - 5.20 10*6/mm3 Final   05/17/2017 4.71 3.90 - 5.20 10*6/mm3 Final     Hemoglobin   Date Value Ref Range Status   06/05/2017 16.4 (H) 11.9 - 15.5 g/dL Final   05/17/2017 15.2 11.9 - 15.5 g/dL Final     Hematocrit   Date Value Ref Range Status   06/05/2017 48.4 (H) 35.6 - 45.5 % Final   05/17/2017 44.1 35.6 - 45.5 % Final     MCV   Date Value Ref Range Status   06/05/2017 93.8 80.5 - 98.2 fL Final   05/17/2017 93.6 80.5 - 98.2 fL Final     MCH   Date Value Ref Range Status   06/05/2017 31.8 26.9 - 32.0 pg Final   05/17/2017 32.3 (H) 26.9 - 32.0 pg Final     MCHC   Date Value Ref Range Status   06/05/2017 33.9 32.4 - 36.3 g/dL Final   05/17/2017 34.5 32.4 - 36.3 g/dL Final     RDW   Date Value Ref Range Status   06/05/2017 13.0 11.7 - 13.0 % Final   05/17/2017 12.5 11.7 - 13.0 %  Final     RDW-SD   Date Value Ref Range Status   05/17/2017 42.9 37.0 - 54.0 fl Final     MPV   Date Value Ref Range Status   05/17/2017 9.5 6.0 - 12.0 fL Final     Platelets   Date Value Ref Range Status   06/05/2017 400 140 - 500 10*3/mm3 Final   05/17/2017 294 140 - 500 10*3/mm3 Final     Neutrophil %   Date Value Ref Range Status   05/17/2017 64.6 42.7 - 76.0 % Final     Neutrophil Rel %   Date Value Ref Range Status   06/05/2017 74.0 42.7 - 76.0 % Final     Lymphocyte %   Date Value Ref Range Status   05/17/2017 28.1 19.6 - 45.3 % Final     Lymphocyte Rel %   Date Value Ref Range Status   06/05/2017 17.9 (L) 19.6 - 45.3 % Final     Monocyte %   Date Value Ref Range Status   05/17/2017 5.5 5.0 - 12.0 % Final     Monocyte Rel %   Date Value Ref Range Status   06/05/2017 5.5 5.0 - 12.0 % Final     Eosinophil %   Date Value Ref Range Status   05/17/2017 1.5 0.3 - 6.2 % Final     Eosinophil Rel %   Date Value Ref Range Status   06/05/2017 2.1 0.3 - 6.2 % Final     Basophil %   Date Value Ref Range Status   05/17/2017 0.2 0.0 - 1.5 % Final     Basophil Rel %   Date Value Ref Range Status   06/05/2017 0.3 0.0 - 1.5 % Final     Immature Grans %   Date Value Ref Range Status   05/17/2017 0.1 0.0 - 0.5 % Final     Neutrophils, Absolute   Date Value Ref Range Status   05/17/2017 8.83 (H) 1.90 - 8.10 10*3/mm3 Final     Neutrophils Absolute   Date Value Ref Range Status   06/05/2017 13.65 (H) 1.90 - 8.10 10*3/mm3 Final     Lymphocytes, Absolute   Date Value Ref Range Status   05/17/2017 3.85 0.90 - 4.80 10*3/mm3 Final     Lymphocytes Absolute   Date Value Ref Range Status   06/05/2017 3.29 0.90 - 4.80 10*3/mm3 Final     Monocytes, Absolute   Date Value Ref Range Status   05/17/2017 0.76 0.20 - 1.20 10*3/mm3 Final     Monocytes Absolute   Date Value Ref Range Status   06/05/2017 1.01 0.20 - 1.20 10*3/mm3 Final     Eosinophils, Absolute   Date Value Ref Range Status   05/17/2017 0.21 0.00 - 0.70 10*3/mm3 Final     Eosinophils  Absolute   Date Value Ref Range Status   06/05/2017 0.38 0.00 - 0.70 10*3/mm3 Final     Basophils, Absolute   Date Value Ref Range Status   05/17/2017 0.03 0.00 - 0.20 10*3/mm3 Final     Basophils Absolute   Date Value Ref Range Status   06/05/2017 0.05 0.00 - 0.20 10*3/mm3 Final     Immature Grans, Absolute   Date Value Ref Range Status   05/17/2017 0.02 0.00 - 0.03 10*3/mm3 Final       Lab Results   Component Value Date    GLUCOSE 106 (H) 05/17/2017    BUN 12 05/17/2017    CREATININE 0.81 05/17/2017    EGFRIFNONA 79 05/17/2017    BCR 14.8 05/17/2017    CO2 27.0 05/17/2017    CALCIUM 9.1 05/17/2017    ALBUMIN 4.10 05/17/2017    LABIL2 1.3 05/17/2017    AST 15 05/17/2017    ALT 12 05/17/2017         Imaging Results (last 7 days)     ** No results found for the last 168 hours. **            Assessment/Plan    1. LLQ pain: persistent, unchanged.  The only finding on recent CT imaging is a left ovarian cyst.  That was in early May and the likelihood of this persisting and causing her pain over the past 2 months is small.  She is quite adamant about pursuing endoscopic evaluation  2. Nausea: persistent issue, taking prn phenergan  3. Leukocytosis: she has been on antibiotics, no localizing infectious symptoms    Plan  EGD and colonoscopy for workup of nausea and left lower quadrant pain we discussed the procedures, the bowel preparation, the risks and the alternatives and she is quite adamant about proceeding with these tests.  she is to continue that as needed Levsin  I advised her to follow-up with Dr. Cain, her PCP regarding her infectious symptoms  We will repeat CBC today to see where her white cell count is    Terra was seen today for follow-up.    Diagnoses and all orders for this visit:    Lower abdominal pain  -     Case Request; Standing  -     sodium chloride 0.9 % flush 1-10 mL; Infuse 1-10 mL into a venous catheter As Needed for Line Care.  -     lactated ringers infusion; Infuse 30 mL/hr into a venous  catheter Continuous.  -     Case Request    Leukocytosis, unspecified type  -     CBC & Differential    Nausea  -     Case Request; Standing  -     sodium chloride 0.9 % flush 1-10 mL; Infuse 1-10 mL into a venous catheter As Needed for Line Care.  -     lactated ringers infusion; Infuse 30 mL/hr into a venous catheter Continuous.  -     Case Request    Other orders  -     Implement Anesthesia Orders Day of Procedure; Standing  -     Obtain Informed Consent; Standing  -     Insert Peripheral IV; Standing  -     Saline Lock & Maintain IV Access; Standing        Dictated utilizing Dragon dictation

## 2017-07-11 ENCOUNTER — TELEPHONE (OUTPATIENT)
Dept: GASTROENTEROLOGY | Facility: CLINIC | Age: 37
End: 2017-07-11

## 2017-07-11 RX ORDER — MELATONIN
2000 DAILY
COMMUNITY

## 2017-07-11 RX ORDER — IBUPROFEN 200 MG
200 TABLET ORAL EVERY 6 HOURS PRN
COMMUNITY
End: 2017-07-26 | Stop reason: HOSPADM

## 2017-07-11 NOTE — TELEPHONE ENCOUNTER
----- Message from Cherrie Lazaro MD sent at 7/7/2017  1:28 PM EDT -----  Her white cells remain slightly high, but much improved from previously and the same as in May.  If she develops further infectious symptoms, she needs to see Dr Cain again. Otherwise, she will need to be seen by the hematologists

## 2017-07-11 NOTE — TELEPHONE ENCOUNTER
Call to pt.  Advise per Dr Lazaro the white cells remain slightly  High, but much improved from previously and the same as in May.  If develops further infectious symptoms, needs to see Dr Cain again.  Otherwise, will need to be seen by Hematologists.  Pt verb understanding.

## 2017-07-12 ENCOUNTER — HOSPITAL ENCOUNTER (OUTPATIENT)
Facility: HOSPITAL | Age: 37
Setting detail: HOSPITAL OUTPATIENT SURGERY
Discharge: HOME | End: 2017-07-12
Attending: INTERNAL MEDICINE | Admitting: INTERNAL MEDICINE

## 2017-07-12 ENCOUNTER — ANESTHESIA EVENT (OUTPATIENT)
Dept: GASTROENTEROLOGY | Facility: HOSPITAL | Age: 37
End: 2017-07-12

## 2017-07-12 ENCOUNTER — ANESTHESIA (OUTPATIENT)
Dept: GASTROENTEROLOGY | Facility: HOSPITAL | Age: 37
End: 2017-07-12

## 2017-07-12 VITALS
OXYGEN SATURATION: 98 % | DIASTOLIC BLOOD PRESSURE: 79 MMHG | SYSTOLIC BLOOD PRESSURE: 110 MMHG | TEMPERATURE: 98.1 F | HEART RATE: 72 BPM | WEIGHT: 201.8 LBS | RESPIRATION RATE: 16 BRPM | HEIGHT: 64 IN | BODY MASS INDEX: 34.45 KG/M2

## 2017-07-12 DIAGNOSIS — R10.30 LOWER ABDOMINAL PAIN: ICD-10-CM

## 2017-07-12 DIAGNOSIS — R11.0 NAUSEA: ICD-10-CM

## 2017-07-12 LAB
B-HCG UR QL: NEGATIVE
INTERNAL NEGATIVE CONTROL: NEGATIVE
INTERNAL POSITIVE CONTROL: POSITIVE
Lab: NORMAL

## 2017-07-12 PROCEDURE — 25010000002 PROPOFOL 10 MG/ML EMULSION: Performed by: ANESTHESIOLOGY

## 2017-07-12 PROCEDURE — 45380 COLONOSCOPY AND BIOPSY: CPT | Performed by: INTERNAL MEDICINE

## 2017-07-12 PROCEDURE — 88305 TISSUE EXAM BY PATHOLOGIST: CPT | Performed by: INTERNAL MEDICINE

## 2017-07-12 PROCEDURE — 43239 EGD BIOPSY SINGLE/MULTIPLE: CPT | Performed by: INTERNAL MEDICINE

## 2017-07-12 PROCEDURE — 88312 SPECIAL STAINS GROUP 1: CPT | Performed by: INTERNAL MEDICINE

## 2017-07-12 RX ORDER — PROPOFOL 10 MG/ML
VIAL (ML) INTRAVENOUS AS NEEDED
Status: DISCONTINUED | OUTPATIENT
Start: 2017-07-12 | End: 2017-07-12 | Stop reason: SURG

## 2017-07-12 RX ORDER — SODIUM CHLORIDE, SODIUM LACTATE, POTASSIUM CHLORIDE, CALCIUM CHLORIDE 600; 310; 30; 20 MG/100ML; MG/100ML; MG/100ML; MG/100ML
30 INJECTION, SOLUTION INTRAVENOUS CONTINUOUS
Status: DISCONTINUED | OUTPATIENT
Start: 2017-07-12 | End: 2017-07-12 | Stop reason: HOSPADM

## 2017-07-12 RX ORDER — PROPOFOL 10 MG/ML
VIAL (ML) INTRAVENOUS CONTINUOUS PRN
Status: DISCONTINUED | OUTPATIENT
Start: 2017-07-12 | End: 2017-07-12 | Stop reason: SURG

## 2017-07-12 RX ORDER — SODIUM CHLORIDE 0.9 % (FLUSH) 0.9 %
1-10 SYRINGE (ML) INJECTION AS NEEDED
Status: DISCONTINUED | OUTPATIENT
Start: 2017-07-12 | End: 2017-07-12 | Stop reason: HOSPADM

## 2017-07-12 RX ORDER — OMEPRAZOLE 40 MG/1
40 CAPSULE, DELAYED RELEASE ORAL
Qty: 30 CAPSULE | Refills: 2 | Status: SHIPPED | OUTPATIENT
Start: 2017-07-12

## 2017-07-12 RX ADMIN — SODIUM CHLORIDE, POTASSIUM CHLORIDE, SODIUM LACTATE AND CALCIUM CHLORIDE 30 ML/HR: 600; 310; 30; 20 INJECTION, SOLUTION INTRAVENOUS at 11:16

## 2017-07-12 RX ADMIN — SODIUM CHLORIDE, POTASSIUM CHLORIDE, SODIUM LACTATE AND CALCIUM CHLORIDE: 600; 310; 30; 20 INJECTION, SOLUTION INTRAVENOUS at 11:40

## 2017-07-12 RX ADMIN — PROPOFOL 300 MCG/KG/MIN: 10 INJECTION, EMULSION INTRAVENOUS at 11:44

## 2017-07-12 RX ADMIN — PROPOFOL 200 MG: 10 INJECTION, EMULSION INTRAVENOUS at 11:44

## 2017-07-12 RX ADMIN — EPHEDRINE SULFATE 10 MG: 50 INJECTION INTRAMUSCULAR; INTRAVENOUS; SUBCUTANEOUS at 11:58

## 2017-07-12 NOTE — PLAN OF CARE
Problem: Patient Care Overview (Adult)  Goal: Plan of Care Review  Outcome: Ongoing (interventions implemented as appropriate)  Goal: Adult Individualization and Mutuality  Outcome: Ongoing (interventions implemented as appropriate)    07/12/17 1056   Individualization   Patient Specific Preferences NONE       Goal: Discharge Needs Assessment  Outcome: Ongoing (interventions implemented as appropriate)    07/12/17 1056   Discharge Needs Assessment   Concerns To Be Addressed no discharge needs identified   Living Environment   Transportation Available family or friend will provide         Problem: GI Endoscopy (Adult)  Goal: Signs and Symptoms of Listed Potential Problems Will be Absent or Manageable (GI Endoscopy)  Outcome: Ongoing (interventions implemented as appropriate)    07/12/17 1056   GI Endoscopy   Problems Present (GI Endoscopy) none

## 2017-07-12 NOTE — ANESTHESIA POSTPROCEDURE EVALUATION
Patient: Terra Deutsch    Procedure Summary     Date Anesthesia Start Anesthesia Stop Room / Location    07/12/17 1140 1229  [facility] /  [facility]       Procedure Diagnosis Surgeon Provider    COLONOSCOPY to terminal ileum with polypectomy (cold) (N/A ); ESOPHAGOGASTRODUODENOSCOPY with bx (N/A Esophagus) Nausea; Lower abdominal pain  (Nausea [R11.0]; Lower abdominal pain [R10.30]) MD Arturo Burch MD          Anesthesia Type: MAC  Last vitals  BP      Temp      Pulse     Resp      SpO2        Post Anesthesia Care and Evaluation    Patient location during evaluation: PHASE II  Patient participation: complete - patient participated  Level of consciousness: awake and alert  Pain management: adequate  Airway patency: patent  Anesthetic complications: No anesthetic complications  PONV Status: none  Cardiovascular status: acceptable  Respiratory status: acceptable  Hydration status: acceptable

## 2017-07-12 NOTE — ANESTHESIA PREPROCEDURE EVALUATION
Anesthesia Evaluation     Patient summary reviewed and Nursing notes reviewed          Airway   Mallampati: II  TM distance: >3 FB  Neck ROM: full  no difficulty expected  Dental    (+) edentulous    Pulmonary    (+) a smoker Current, COPD, asthma, sleep apnea, rhonchi,   Cardiovascular - normal exam    (+) hypertension,       Neuro/Psych  (+) psychiatric history Anxiety,      ROS Comment: Hx of panic attacks  GI/Hepatic/Renal/Endo    (+) obesity, morbid obesity, GERD,     Musculoskeletal     (+) back pain,   Abdominal   (+) obese,    Substance History - negative use     OB/GYN negative ob/gyn ROS         Other                                    Anesthesia Plan    ASA 3     MAC     intravenous induction   Anesthetic plan and risks discussed with patient.

## 2017-07-12 NOTE — DISCHARGE INSTRUCTIONS
For the next 24 hours patient needs to be with a responsible adult.    For 24 hours DO NOT drive, operate machinery, appliances, drink alcohol, make important decisions or sign legal documents.    Start with a light or bland diet and advance to regular diet as tolerated.    Follow recommendations on procedure report provided by your doctor.    Call Dr Lazaro for problems [phone] and for biopsy results in 7-10 days.    Problems may include but not limited to: large amounts of bleeding, trouble breathing, repeated vomiting, severe unrelieved pain, fever or chills.

## 2017-07-13 ENCOUNTER — TELEPHONE (OUTPATIENT)
Dept: GASTROENTEROLOGY | Facility: CLINIC | Age: 37
End: 2017-07-13

## 2017-07-13 NOTE — PROGRESS NOTES
One of the 2 polyps removed was an adenoma.  The other one was a benign polyp.  In the absence of symptoms she is due for repeat colonoscopy in 5 years.  Please place in 5 year recall.    No evidence of celiac disease in her small intestine.  Mild gastritis in her stomach body. no evidence of H. Pylori.    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  We'll send for further HPV testing and follow that up.

## 2017-07-13 NOTE — TELEPHONE ENCOUNTER
----- Message from Cherrie Lazaro MD sent at 7/13/2017  2:18 PM EDT -----  Need HPV testing on the esophageal lesions, path says to fax the request to them at [phone] with the specimen #-->     Case: [id number]     x

## 2017-07-17 NOTE — TELEPHONE ENCOUNTER
Call to [facility] Path @ [phone] and spoke with Val.  States that specimen sent out 7/14 - may have results by 7/19.

## 2017-07-19 ENCOUNTER — APPOINTMENT (OUTPATIENT)
Dept: PREADMISSION TESTING | Facility: HOSPITAL | Age: 37
End: 2017-07-19

## 2017-07-19 VITALS
OXYGEN SATURATION: 100 % | HEIGHT: 63 IN | RESPIRATION RATE: 20 BRPM | TEMPERATURE: 97 F | DIASTOLIC BLOOD PRESSURE: 81 MMHG | SYSTOLIC BLOOD PRESSURE: 113 MMHG | HEART RATE: 67 BPM | WEIGHT: 205 LBS | BODY MASS INDEX: 36.32 KG/M2

## 2017-07-19 LAB
ANION GAP SERPL CALCULATED.3IONS-SCNC: 12.8 MMOL/L
BUN BLD-MCNC: 11 MG/DL (ref 6–20)
BUN/CREAT SERPL: 16.4 (ref 7–25)
CALCIUM SPEC-SCNC: 9.5 MG/DL (ref 8.6–10.5)
CHLORIDE SERPL-SCNC: 99 MMOL/L (ref 98–107)
CO2 SERPL-SCNC: 25.2 MMOL/L (ref 22–29)
CREAT BLD-MCNC: 0.67 MG/DL (ref 0.57–1)
DEPRECATED RDW RBC AUTO: 43.8 FL (ref 37–54)
ERYTHROCYTE [DISTWIDTH] IN BLOOD BY AUTOMATED COUNT: 12.6 % (ref 11.7–13)
GFR SERPL CREATININE-BSD FRML MDRD: 99 ML/MIN/1.73
GLUCOSE BLD-MCNC: 95 MG/DL (ref 65–99)
HCG SERPL QL: NEGATIVE
HCT VFR BLD AUTO: 45.1 % (ref 35.6–45.5)
HGB BLD-MCNC: 15.3 G/DL (ref 11.9–15.5)
MCH RBC QN AUTO: 32.3 PG (ref 26.9–32)
MCHC RBC AUTO-ENTMCNC: 33.9 G/DL (ref 32.4–36.3)
MCV RBC AUTO: 95.3 FL (ref 80.5–98.2)
PLATELET # BLD AUTO: 306 10*3/MM3 (ref 140–500)
PMV BLD AUTO: 9.4 FL (ref 6–12)
POTASSIUM BLD-SCNC: 4 MMOL/L (ref 3.5–5.2)
RBC # BLD AUTO: 4.73 10*6/MM3 (ref 3.9–5.2)
SODIUM BLD-SCNC: 137 MMOL/L (ref 136–145)
WBC NRBC COR # BLD: 12.73 10*3/MM3 (ref 4.5–10.7)

## 2017-07-19 PROCEDURE — 93005 ELECTROCARDIOGRAM TRACING: CPT

## 2017-07-19 PROCEDURE — 93010 ELECTROCARDIOGRAM REPORT: CPT | Performed by: INTERNAL MEDICINE

## 2017-07-19 PROCEDURE — 84703 CHORIONIC GONADOTROPIN ASSAY: CPT | Performed by: OBSTETRICS & GYNECOLOGY

## 2017-07-19 PROCEDURE — 80048 BASIC METABOLIC PNL TOTAL CA: CPT | Performed by: OBSTETRICS & GYNECOLOGY

## 2017-07-19 PROCEDURE — 85027 COMPLETE CBC AUTOMATED: CPT | Performed by: OBSTETRICS & GYNECOLOGY

## 2017-07-19 PROCEDURE — 36415 COLL VENOUS BLD VENIPUNCTURE: CPT

## 2017-07-19 NOTE — DISCHARGE INSTRUCTIONS
Take the following medications the morning of surgery with a small sip of water:        General Instructions:  • Do not eat solid food after midnight the night before surgery.  • You may drink clear liquids day of surgery but must stop at least one hour before your hospital arrival time.  • It is beneficial for you to have a clear drink that contains carbohydrates the day of surgery.  We suggest a 20 ounce bottle of Gatorade or Powerade for non-diabetic patients or a 20 ounce bottle of G2 or Powerade Zero for diabetic patients. (Pediatric patients, are not advised to drink a 20 ounce carbohydrate drink)    Clear liquids are liquids you can see through.  Nothing red in color.     Plain water                               Sports drinks  Sodas                                   Gelatin (Jell-O)  Fruit juices without pulp such as white grape juice and apple juice  Popsicles that contain no fruit or yogurt  Tea or coffee (no cream or milk added)  Gatorade / Powerade  G2 / Powerade Zero    • Infants may have breast milk up to four hours before surgery.  • Infants drinking formula may drink formula up to six hours before surgery.   • Patients who avoid smoking, chewing tobacco and alcohol for 4 weeks prior to surgery have a reduced risk of post-operative complications.  Quit smoking as many days before surgery as you can.  • Do not smoke, use chewing tobacco or drink alcohol the day of surgery.   • If applicable bring your C-PAP/ BI-PAP machine.  • Bring any papers given to you in the doctor’s office.  • Wear clean comfortable clothes and socks.  • Do not wear contact lenses or make-up.  Bring a case for your glasses.   • Bring crutches or walker if applicable.  • Leave all other valuables and jewelry at home.  • The Pre-Admission Testing nurse will instruct you to bring medications if unable to obtain an accurate list in Pre-Admission Testing.        If you were given a blood bank ID arm band remember to bring it with you  the day of surgery.    Preventing a Surgical Site Infection:  • For 2 to 3 days before surgery, avoid shaving with a razor because the razor can irritate skin and make it easier to develop an infection.  • The night prior to surgery sleep in a clean bed with clean clothing.  Do not allow pets to sleep with you.  • Shower on the morning of surgery using a fresh bar of anti-bacterial soap (such as Dial) and clean washcloth.  Dry with a clean towel and dress in clean clothing.  • Ask your surgeon if you will be receiving antibiotics prior to surgery.  • Make sure you, your family, and all healthcare providers clean their hands with soap and water or an alcohol based hand  before caring for you or your wound.    Day of surgery:  Upon arrival, a Pre-op nurse and Anesthesiologist will review your health history, obtain vital signs, and answer questions you may have.  The only belongings needed at this time will be your home medications and if applicable your C-PAP/BI-PAP machine.  If you are staying overnight your family can leave the rest of your belongings in the car and bring them to your room later.  A Pre-op nurse will start an IV and you may receive medication in preparation for surgery, including something to help you relax.  Your family will be able to see you in the Pre-op area.  While you are in surgery your family should notify the waiting room  if they leave the waiting room area and provide a contact phone number.    Please be aware that surgery does come with discomfort.  We want to make every effort to control your discomfort so please discuss any uncontrolled symptoms with your nurse.   Your doctor will most likely have prescribed pain medications.      If you are going home after surgery you will receive individualized written care instructions before being discharged.  A responsible adult must drive you to and from the hospital on the day of your surgery and stay with you for 24  hours.    If you are staying overnight following surgery, you will be transported to your hospital room following the recovery period.  [facility] has all private rooms.    If you have any questions please call Pre-Admission Testing at [phone].  Deductibles and co-payments are collected on the day of service. Please be prepared to pay the required co-pay, deductible or deposit on the day of service as defined by your plan.

## 2017-07-19 NOTE — TELEPHONE ENCOUNTER
Call to [facility] Path lab and spoke with Val.  She states results not yet back and that she will call [facility] for results.

## 2017-07-19 NOTE — TELEPHONE ENCOUNTER
Call from Val.  States has checked with [facility] and they will place expedited request on specimen.

## 2017-07-20 LAB
CYTO UR: NORMAL
LAB AP CASE REPORT: NORMAL
Lab: NORMAL
PATH REPORT.ADDENDUM SPEC: NORMAL
PATH REPORT.FINAL DX SPEC: NORMAL
PATH REPORT.GROSS SPEC: NORMAL

## 2017-07-20 NOTE — TELEPHONE ENCOUNTER
"HPV results available under \"tissue exam - can be found below \"microscopic description\".  Click on blue highlighted \"scan on 7/20/17 9/50 ... In-situ hybridization analysis\".    Message to DR Lazaro.  "

## 2017-07-21 NOTE — PROGRESS NOTES
The viral testing of the squamous papilloma in her esophagus was negative which indicates not a precancerous concern

## 2017-07-24 ENCOUNTER — HOSPITAL ENCOUNTER (OUTPATIENT)
Facility: HOSPITAL | Age: 37
Setting detail: OBSERVATION
Discharge: HOME | End: 2017-07-26
Attending: OBSTETRICS & GYNECOLOGY | Admitting: OBSTETRICS & GYNECOLOGY

## 2017-07-24 ENCOUNTER — ANESTHESIA EVENT (OUTPATIENT)
Dept: PERIOP | Facility: HOSPITAL | Age: 37
End: 2017-07-24

## 2017-07-24 ENCOUNTER — ANESTHESIA (OUTPATIENT)
Dept: PERIOP | Facility: HOSPITAL | Age: 37
End: 2017-07-24

## 2017-07-24 DIAGNOSIS — R10.2 PELVIC PAIN: ICD-10-CM

## 2017-07-24 PROCEDURE — 25010000002 MIDAZOLAM PER 1 MG: Performed by: ANESTHESIOLOGY

## 2017-07-24 PROCEDURE — 25010000002 ONDANSETRON PER 1 MG: Performed by: NURSE ANESTHETIST, CERTIFIED REGISTERED

## 2017-07-24 PROCEDURE — 25010000002 MIDAZOLAM PER 1 MG: Performed by: NURSE ANESTHETIST, CERTIFIED REGISTERED

## 2017-07-24 PROCEDURE — 88307 TISSUE EXAM BY PATHOLOGIST: CPT | Performed by: OBSTETRICS & GYNECOLOGY

## 2017-07-24 PROCEDURE — G0378 HOSPITAL OBSERVATION PER HR: HCPCS

## 2017-07-24 PROCEDURE — 25010000002 FENTANYL CITRATE (PF) 100 MCG/2ML SOLUTION: Performed by: NURSE ANESTHETIST, CERTIFIED REGISTERED

## 2017-07-24 PROCEDURE — 25010000002 HYDROMORPHONE PER 4 MG: Performed by: NURSE ANESTHETIST, CERTIFIED REGISTERED

## 2017-07-24 PROCEDURE — 25010000002 ROPIVACAINE PER 1 MG: Performed by: OBSTETRICS & GYNECOLOGY

## 2017-07-24 PROCEDURE — 25010000002 DEXAMETHASONE PER 1 MG: Performed by: NURSE ANESTHETIST, CERTIFIED REGISTERED

## 2017-07-24 PROCEDURE — 94799 UNLISTED PULMONARY SVC/PX: CPT

## 2017-07-24 PROCEDURE — 25010000002 GENTAMICIN PER 80 MG: Performed by: NURSE ANESTHETIST, CERTIFIED REGISTERED

## 2017-07-24 PROCEDURE — 25010000002 PROPOFOL 10 MG/ML EMULSION: Performed by: NURSE ANESTHETIST, CERTIFIED REGISTERED

## 2017-07-24 PROCEDURE — 25010000002 ALBUMIN HUMAN 5% PER 50 ML: Performed by: NURSE ANESTHETIST, CERTIFIED REGISTERED

## 2017-07-24 PROCEDURE — P9041 ALBUMIN (HUMAN),5%, 50ML: HCPCS | Performed by: NURSE ANESTHETIST, CERTIFIED REGISTERED

## 2017-07-24 PROCEDURE — 25010000002 PROMETHAZINE PER 50 MG: Performed by: NURSE ANESTHETIST, CERTIFIED REGISTERED

## 2017-07-24 PROCEDURE — 25010000002 NEOSTIGMINE PER 0.5 MG: Performed by: NURSE ANESTHETIST, CERTIFIED REGISTERED

## 2017-07-24 RX ORDER — HYDROMORPHONE HYDROCHLORIDE 1 MG/ML
0.5 INJECTION, SOLUTION INTRAMUSCULAR; INTRAVENOUS; SUBCUTANEOUS
Status: DISCONTINUED | OUTPATIENT
Start: 2017-07-24 | End: 2017-07-26 | Stop reason: HOSPADM

## 2017-07-24 RX ORDER — DIPHENHYDRAMINE HYDROCHLORIDE 50 MG/ML
12.5 INJECTION INTRAMUSCULAR; INTRAVENOUS
Status: DISCONTINUED | OUTPATIENT
Start: 2017-07-24 | End: 2017-07-24 | Stop reason: HOSPADM

## 2017-07-24 RX ORDER — PROMETHAZINE HYDROCHLORIDE 25 MG/1
25 TABLET ORAL ONCE AS NEEDED
Status: COMPLETED | OUTPATIENT
Start: 2017-07-24 | End: 2017-07-24

## 2017-07-24 RX ORDER — FENTANYL CITRATE 50 UG/ML
INJECTION, SOLUTION INTRAMUSCULAR; INTRAVENOUS AS NEEDED
Status: DISCONTINUED | OUTPATIENT
Start: 2017-07-24 | End: 2017-07-24 | Stop reason: SURG

## 2017-07-24 RX ORDER — PROMETHAZINE HYDROCHLORIDE 25 MG/1
25 SUPPOSITORY RECTAL ONCE AS NEEDED
Status: COMPLETED | OUTPATIENT
Start: 2017-07-24 | End: 2017-07-24

## 2017-07-24 RX ORDER — PROMETHAZINE HYDROCHLORIDE 25 MG/ML
12.5 INJECTION, SOLUTION INTRAMUSCULAR; INTRAVENOUS ONCE AS NEEDED
Status: COMPLETED | OUTPATIENT
Start: 2017-07-24 | End: 2017-07-24

## 2017-07-24 RX ORDER — ONDANSETRON 4 MG/1
4 TABLET, FILM COATED ORAL EVERY 6 HOURS PRN
Status: DISCONTINUED | OUTPATIENT
Start: 2017-07-24 | End: 2017-07-26 | Stop reason: HOSPADM

## 2017-07-24 RX ORDER — ONDANSETRON 2 MG/ML
4 INJECTION INTRAMUSCULAR; INTRAVENOUS EVERY 6 HOURS PRN
Status: DISCONTINUED | OUTPATIENT
Start: 2017-07-24 | End: 2017-07-26 | Stop reason: HOSPADM

## 2017-07-24 RX ORDER — MIDAZOLAM HYDROCHLORIDE 1 MG/ML
1 INJECTION INTRAMUSCULAR; INTRAVENOUS
Status: DISCONTINUED | OUTPATIENT
Start: 2017-07-24 | End: 2017-07-24 | Stop reason: HOSPADM

## 2017-07-24 RX ORDER — PROPOFOL 10 MG/ML
VIAL (ML) INTRAVENOUS AS NEEDED
Status: DISCONTINUED | OUTPATIENT
Start: 2017-07-24 | End: 2017-07-24 | Stop reason: SURG

## 2017-07-24 RX ORDER — OXYCODONE AND ACETAMINOPHEN 7.5; 325 MG/1; MG/1
1 TABLET ORAL EVERY 4 HOURS PRN
Status: DISCONTINUED | OUTPATIENT
Start: 2017-07-24 | End: 2017-07-26 | Stop reason: HOSPADM

## 2017-07-24 RX ORDER — ONDANSETRON 2 MG/ML
4 INJECTION INTRAMUSCULAR; INTRAVENOUS ONCE AS NEEDED
Status: DISCONTINUED | OUTPATIENT
Start: 2017-07-24 | End: 2017-07-24 | Stop reason: HOSPADM

## 2017-07-24 RX ORDER — LIDOCAINE HYDROCHLORIDE 40 MG/ML
SOLUTION TOPICAL AS NEEDED
Status: DISCONTINUED | OUTPATIENT
Start: 2017-07-24 | End: 2017-07-24 | Stop reason: SURG

## 2017-07-24 RX ORDER — EPHEDRINE SULFATE 50 MG/ML
5 INJECTION, SOLUTION INTRAVENOUS ONCE AS NEEDED
Status: DISCONTINUED | OUTPATIENT
Start: 2017-07-24 | End: 2017-07-24 | Stop reason: HOSPADM

## 2017-07-24 RX ORDER — HYDROMORPHONE HCL 110MG/55ML
PATIENT CONTROLLED ANALGESIA SYRINGE INTRAVENOUS AS NEEDED
Status: DISCONTINUED | OUTPATIENT
Start: 2017-07-24 | End: 2017-07-24 | Stop reason: SURG

## 2017-07-24 RX ORDER — NALOXONE HCL 0.4 MG/ML
0.2 VIAL (ML) INJECTION AS NEEDED
Status: DISCONTINUED | OUTPATIENT
Start: 2017-07-24 | End: 2017-07-24 | Stop reason: HOSPADM

## 2017-07-24 RX ORDER — FENTANYL CITRATE 50 UG/ML
50 INJECTION, SOLUTION INTRAMUSCULAR; INTRAVENOUS
Status: DISCONTINUED | OUTPATIENT
Start: 2017-07-24 | End: 2017-07-24 | Stop reason: HOSPADM

## 2017-07-24 RX ORDER — SODIUM CHLORIDE, SODIUM LACTATE, POTASSIUM CHLORIDE, CALCIUM CHLORIDE 600; 310; 30; 20 MG/100ML; MG/100ML; MG/100ML; MG/100ML
INJECTION, SOLUTION INTRAVENOUS CONTINUOUS PRN
Status: DISCONTINUED | OUTPATIENT
Start: 2017-07-24 | End: 2017-07-24 | Stop reason: SURG

## 2017-07-24 RX ORDER — IPRATROPIUM BROMIDE AND ALBUTEROL SULFATE 2.5; .5 MG/3ML; MG/3ML
3 SOLUTION RESPIRATORY (INHALATION) ONCE AS NEEDED
Status: DISCONTINUED | OUTPATIENT
Start: 2017-07-24 | End: 2017-07-24 | Stop reason: HOSPADM

## 2017-07-24 RX ORDER — ALBUMIN, HUMAN INJ 5% 5 %
SOLUTION INTRAVENOUS CONTINUOUS PRN
Status: DISCONTINUED | OUTPATIENT
Start: 2017-07-24 | End: 2017-07-24 | Stop reason: SURG

## 2017-07-24 RX ORDER — CETIRIZINE HYDROCHLORIDE 10 MG/1
10 TABLET ORAL DAILY
COMMUNITY

## 2017-07-24 RX ORDER — ONDANSETRON 2 MG/ML
INJECTION INTRAMUSCULAR; INTRAVENOUS AS NEEDED
Status: DISCONTINUED | OUTPATIENT
Start: 2017-07-24 | End: 2017-07-24 | Stop reason: SURG

## 2017-07-24 RX ORDER — GLYCOPYRROLATE 0.2 MG/ML
INJECTION INTRAMUSCULAR; INTRAVENOUS AS NEEDED
Status: DISCONTINUED | OUTPATIENT
Start: 2017-07-24 | End: 2017-07-24 | Stop reason: SURG

## 2017-07-24 RX ORDER — SODIUM CHLORIDE, SODIUM LACTATE, POTASSIUM CHLORIDE, CALCIUM CHLORIDE 600; 310; 30; 20 MG/100ML; MG/100ML; MG/100ML; MG/100ML
100 INJECTION, SOLUTION INTRAVENOUS CONTINUOUS
Status: DISCONTINUED | OUTPATIENT
Start: 2017-07-24 | End: 2017-07-26 | Stop reason: HOSPADM

## 2017-07-24 RX ORDER — FAMOTIDINE 10 MG/ML
20 INJECTION, SOLUTION INTRAVENOUS ONCE
Status: COMPLETED | OUTPATIENT
Start: 2017-07-24 | End: 2017-07-24

## 2017-07-24 RX ORDER — CLINDAMYCIN PHOSPHATE 900 MG/50ML
900 INJECTION INTRAVENOUS ONCE
Status: COMPLETED | OUTPATIENT
Start: 2017-07-24 | End: 2017-07-24

## 2017-07-24 RX ORDER — HYDROCODONE BITARTRATE AND ACETAMINOPHEN 7.5; 325 MG/1; MG/1
1 TABLET ORAL ONCE AS NEEDED
Status: DISCONTINUED | OUTPATIENT
Start: 2017-07-24 | End: 2017-07-24 | Stop reason: HOSPADM

## 2017-07-24 RX ORDER — HYDRALAZINE HYDROCHLORIDE 20 MG/ML
5 INJECTION INTRAMUSCULAR; INTRAVENOUS
Status: DISCONTINUED | OUTPATIENT
Start: 2017-07-24 | End: 2017-07-24 | Stop reason: HOSPADM

## 2017-07-24 RX ORDER — OXYCODONE AND ACETAMINOPHEN 7.5; 325 MG/1; MG/1
1 TABLET ORAL ONCE AS NEEDED
Status: COMPLETED | OUTPATIENT
Start: 2017-07-24 | End: 2017-07-24

## 2017-07-24 RX ORDER — NAPROXEN 250 MG/1
250 TABLET ORAL 2 TIMES DAILY PRN
Status: DISCONTINUED | OUTPATIENT
Start: 2017-07-24 | End: 2017-07-25

## 2017-07-24 RX ORDER — PROMETHAZINE HYDROCHLORIDE 25 MG/1
12.5 TABLET ORAL ONCE AS NEEDED
Status: DISCONTINUED | OUTPATIENT
Start: 2017-07-24 | End: 2017-07-24 | Stop reason: HOSPADM

## 2017-07-24 RX ORDER — NALOXONE HCL 0.4 MG/ML
0.1 VIAL (ML) INJECTION
Status: DISCONTINUED | OUTPATIENT
Start: 2017-07-24 | End: 2017-07-26 | Stop reason: HOSPADM

## 2017-07-24 RX ORDER — EPHEDRINE SULFATE 50 MG/ML
INJECTION, SOLUTION INTRAVENOUS AS NEEDED
Status: DISCONTINUED | OUTPATIENT
Start: 2017-07-24 | End: 2017-07-24 | Stop reason: SURG

## 2017-07-24 RX ORDER — MAGNESIUM HYDROXIDE 1200 MG/15ML
LIQUID ORAL AS NEEDED
Status: DISCONTINUED | OUTPATIENT
Start: 2017-07-24 | End: 2017-07-24 | Stop reason: HOSPADM

## 2017-07-24 RX ORDER — SODIUM CHLORIDE 9 MG/ML
INJECTION, SOLUTION INTRAVENOUS AS NEEDED
Status: DISCONTINUED | OUTPATIENT
Start: 2017-07-24 | End: 2017-07-24 | Stop reason: HOSPADM

## 2017-07-24 RX ORDER — LABETALOL HYDROCHLORIDE 5 MG/ML
5 INJECTION, SOLUTION INTRAVENOUS
Status: DISCONTINUED | OUTPATIENT
Start: 2017-07-24 | End: 2017-07-24 | Stop reason: HOSPADM

## 2017-07-24 RX ORDER — ONDANSETRON 4 MG/1
4 TABLET, ORALLY DISINTEGRATING ORAL EVERY 6 HOURS PRN
Status: DISCONTINUED | OUTPATIENT
Start: 2017-07-24 | End: 2017-07-26 | Stop reason: HOSPADM

## 2017-07-24 RX ORDER — SODIUM CHLORIDE 0.9 % (FLUSH) 0.9 %
1-10 SYRINGE (ML) INJECTION AS NEEDED
Status: DISCONTINUED | OUTPATIENT
Start: 2017-07-24 | End: 2017-07-24 | Stop reason: HOSPADM

## 2017-07-24 RX ORDER — SIMETHICONE 80 MG
80 TABLET,CHEWABLE ORAL 4 TIMES DAILY PRN
Status: DISCONTINUED | OUTPATIENT
Start: 2017-07-24 | End: 2017-07-26 | Stop reason: HOSPADM

## 2017-07-24 RX ORDER — LIDOCAINE HYDROCHLORIDE 20 MG/ML
INJECTION, SOLUTION INFILTRATION; PERINEURAL AS NEEDED
Status: DISCONTINUED | OUTPATIENT
Start: 2017-07-24 | End: 2017-07-24 | Stop reason: SURG

## 2017-07-24 RX ORDER — MIDAZOLAM HYDROCHLORIDE 1 MG/ML
2 INJECTION INTRAMUSCULAR; INTRAVENOUS
Status: DISCONTINUED | OUTPATIENT
Start: 2017-07-24 | End: 2017-07-24 | Stop reason: HOSPADM

## 2017-07-24 RX ORDER — HYDROMORPHONE HYDROCHLORIDE 1 MG/ML
0.5 INJECTION, SOLUTION INTRAMUSCULAR; INTRAVENOUS; SUBCUTANEOUS
Status: DISCONTINUED | OUTPATIENT
Start: 2017-07-24 | End: 2017-07-24 | Stop reason: HOSPADM

## 2017-07-24 RX ORDER — GENTAMICIN SULFATE 40 MG/ML
INJECTION, SOLUTION INTRAMUSCULAR; INTRAVENOUS AS NEEDED
Status: DISCONTINUED | OUTPATIENT
Start: 2017-07-24 | End: 2017-07-24 | Stop reason: SURG

## 2017-07-24 RX ORDER — FLUMAZENIL 0.1 MG/ML
0.2 INJECTION INTRAVENOUS AS NEEDED
Status: DISCONTINUED | OUTPATIENT
Start: 2017-07-24 | End: 2017-07-24 | Stop reason: HOSPADM

## 2017-07-24 RX ORDER — ZOLPIDEM TARTRATE 5 MG/1
5 TABLET ORAL NIGHTLY PRN
Status: DISCONTINUED | OUTPATIENT
Start: 2017-07-24 | End: 2017-07-26 | Stop reason: HOSPADM

## 2017-07-24 RX ORDER — SODIUM CHLORIDE, SODIUM LACTATE, POTASSIUM CHLORIDE, CALCIUM CHLORIDE 600; 310; 30; 20 MG/100ML; MG/100ML; MG/100ML; MG/100ML
9 INJECTION, SOLUTION INTRAVENOUS CONTINUOUS
Status: DISCONTINUED | OUTPATIENT
Start: 2017-07-24 | End: 2017-07-24 | Stop reason: HOSPADM

## 2017-07-24 RX ORDER — DEXAMETHASONE SODIUM PHOSPHATE 10 MG/ML
INJECTION INTRAMUSCULAR; INTRAVENOUS AS NEEDED
Status: DISCONTINUED | OUTPATIENT
Start: 2017-07-24 | End: 2017-07-24 | Stop reason: SURG

## 2017-07-24 RX ORDER — ROCURONIUM BROMIDE 10 MG/ML
INJECTION, SOLUTION INTRAVENOUS AS NEEDED
Status: DISCONTINUED | OUTPATIENT
Start: 2017-07-24 | End: 2017-07-24 | Stop reason: SURG

## 2017-07-24 RX ADMIN — EPHEDRINE SULFATE 10 MG: 50 INJECTION INTRAMUSCULAR; INTRAVENOUS; SUBCUTANEOUS at 10:47

## 2017-07-24 RX ADMIN — FENTANYL CITRATE 100 MCG: 50 INJECTION INTRAMUSCULAR; INTRAVENOUS at 10:24

## 2017-07-24 RX ADMIN — SODIUM CHLORIDE, POTASSIUM CHLORIDE, SODIUM LACTATE AND CALCIUM CHLORIDE 100 ML/HR: 600; 310; 30; 20 INJECTION, SOLUTION INTRAVENOUS at 16:33

## 2017-07-24 RX ADMIN — OXYCODONE HYDROCHLORIDE AND ACETAMINOPHEN 1 TABLET: 7.5; 325 TABLET ORAL at 17:12

## 2017-07-24 RX ADMIN — GLYCOPYRROLATE 0.4 MG: 0.2 INJECTION INTRAMUSCULAR; INTRAVENOUS at 12:43

## 2017-07-24 RX ADMIN — GENTAMICIN SULFATE 100 MG: 40 INJECTION, SOLUTION INTRAMUSCULAR; INTRAVENOUS at 10:56

## 2017-07-24 RX ADMIN — MIDAZOLAM 1 MG: 1 INJECTION INTRAMUSCULAR; INTRAVENOUS at 13:37

## 2017-07-24 RX ADMIN — OXYCODONE HYDROCHLORIDE AND ACETAMINOPHEN 1 TABLET: 7.5; 325 TABLET ORAL at 13:35

## 2017-07-24 RX ADMIN — ALBUMIN (HUMAN): 0.05 SOLUTION INTRAVENOUS at 11:22

## 2017-07-24 RX ADMIN — FENTANYL CITRATE 50 MCG: 50 INJECTION INTRAMUSCULAR; INTRAVENOUS at 13:55

## 2017-07-24 RX ADMIN — HYDROMORPHONE HYDROCHLORIDE 0.5 MG: 2 INJECTION, SOLUTION INTRAMUSCULAR; INTRAVENOUS; SUBCUTANEOUS at 10:57

## 2017-07-24 RX ADMIN — HYDROMORPHONE HYDROCHLORIDE 0.5 MG: 2 INJECTION, SOLUTION INTRAMUSCULAR; INTRAVENOUS; SUBCUTANEOUS at 13:11

## 2017-07-24 RX ADMIN — FAMOTIDINE 20 MG: 10 INJECTION INTRAVENOUS at 10:04

## 2017-07-24 RX ADMIN — PROMETHAZINE HYDROCHLORIDE 12.5 MG: 25 INJECTION INTRAMUSCULAR; INTRAVENOUS at 15:45

## 2017-07-24 RX ADMIN — EPHEDRINE SULFATE 10 MG: 50 INJECTION INTRAMUSCULAR; INTRAVENOUS; SUBCUTANEOUS at 10:44

## 2017-07-24 RX ADMIN — MIDAZOLAM 2 MG: 1 INJECTION INTRAMUSCULAR; INTRAVENOUS at 10:04

## 2017-07-24 RX ADMIN — HYDROMORPHONE HYDROCHLORIDE 0.5 MG: 2 INJECTION, SOLUTION INTRAMUSCULAR; INTRAVENOUS; SUBCUTANEOUS at 12:47

## 2017-07-24 RX ADMIN — HYDROMORPHONE HYDROCHLORIDE 0.5 MG: 1 INJECTION, SOLUTION INTRAMUSCULAR; INTRAVENOUS; SUBCUTANEOUS at 14:51

## 2017-07-24 RX ADMIN — FENTANYL CITRATE 50 MCG: 50 INJECTION INTRAMUSCULAR; INTRAVENOUS at 14:45

## 2017-07-24 RX ADMIN — FENTANYL CITRATE 50 MCG: 50 INJECTION INTRAMUSCULAR; INTRAVENOUS at 13:11

## 2017-07-24 RX ADMIN — FENTANYL CITRATE 50 MCG: 50 INJECTION INTRAMUSCULAR; INTRAVENOUS at 13:28

## 2017-07-24 RX ADMIN — SODIUM CHLORIDE, POTASSIUM CHLORIDE, SODIUM LACTATE AND CALCIUM CHLORIDE 9 ML/HR: 600; 310; 30; 20 INJECTION, SOLUTION INTRAVENOUS at 08:55

## 2017-07-24 RX ADMIN — SODIUM CHLORIDE, POTASSIUM CHLORIDE, SODIUM LACTATE AND CALCIUM CHLORIDE: 600; 310; 30; 20 INJECTION, SOLUTION INTRAVENOUS at 10:20

## 2017-07-24 RX ADMIN — FENTANYL CITRATE 50 MCG: 50 INJECTION INTRAMUSCULAR; INTRAVENOUS at 14:19

## 2017-07-24 RX ADMIN — HYDROMORPHONE HYDROCHLORIDE 0.5 MG: 2 INJECTION, SOLUTION INTRAMUSCULAR; INTRAVENOUS; SUBCUTANEOUS at 13:22

## 2017-07-24 RX ADMIN — CLINDAMYCIN PHOSPHATE 900 MG: 900 INJECTION INTRAVENOUS at 10:35

## 2017-07-24 RX ADMIN — EPHEDRINE SULFATE 10 MG: 50 INJECTION INTRAMUSCULAR; INTRAVENOUS; SUBCUTANEOUS at 12:57

## 2017-07-24 RX ADMIN — MIDAZOLAM 1 MG: 1 INJECTION INTRAMUSCULAR; INTRAVENOUS at 13:25

## 2017-07-24 RX ADMIN — ROCURONIUM BROMIDE 50 MG: 10 INJECTION INTRAVENOUS at 10:28

## 2017-07-24 RX ADMIN — HYDROMORPHONE HYDROCHLORIDE 0.5 MG: 1 INJECTION, SOLUTION INTRAMUSCULAR; INTRAVENOUS; SUBCUTANEOUS at 14:05

## 2017-07-24 RX ADMIN — HYDROMORPHONE HYDROCHLORIDE 0.5 MG: 1 INJECTION, SOLUTION INTRAMUSCULAR; INTRAVENOUS; SUBCUTANEOUS at 13:33

## 2017-07-24 RX ADMIN — MIDAZOLAM 1 MG: 1 INJECTION INTRAMUSCULAR; INTRAVENOUS at 14:15

## 2017-07-24 RX ADMIN — OXYCODONE HYDROCHLORIDE AND ACETAMINOPHEN 1 TABLET: 7.5; 325 TABLET ORAL at 20:13

## 2017-07-24 RX ADMIN — NEOSTIGMINE METHYLSULFATE 3 MG: 1 INJECTION INTRAMUSCULAR; INTRAVENOUS; SUBCUTANEOUS at 12:43

## 2017-07-24 RX ADMIN — FENTANYL CITRATE 100 MCG: 50 INJECTION INTRAMUSCULAR; INTRAVENOUS at 10:21

## 2017-07-24 RX ADMIN — ONDANSETRON 4 MG: 2 INJECTION INTRAMUSCULAR; INTRAVENOUS at 12:39

## 2017-07-24 RX ADMIN — MIDAZOLAM 1 MG: 1 INJECTION INTRAMUSCULAR; INTRAVENOUS at 14:45

## 2017-07-24 RX ADMIN — LIDOCAINE HYDROCHLORIDE 100 MG: 20 INJECTION, SOLUTION INFILTRATION; PERINEURAL at 10:28

## 2017-07-24 RX ADMIN — PROPOFOL 200 MG: 10 INJECTION, EMULSION INTRAVENOUS at 10:28

## 2017-07-24 RX ADMIN — FENTANYL CITRATE 50 MCG: 50 INJECTION INTRAMUSCULAR; INTRAVENOUS at 10:57

## 2017-07-24 RX ADMIN — LIDOCAINE HYDROCHLORIDE 1 EACH: 40 SPRAY LARYNGEAL; TRANSTRACHEAL at 10:33

## 2017-07-24 RX ADMIN — SODIUM CHLORIDE, POTASSIUM CHLORIDE, SODIUM LACTATE AND CALCIUM CHLORIDE: 600; 310; 30; 20 INJECTION, SOLUTION INTRAVENOUS at 11:22

## 2017-07-24 RX ADMIN — FENTANYL CITRATE 50 MCG: 50 INJECTION INTRAMUSCULAR; INTRAVENOUS at 13:22

## 2017-07-24 RX ADMIN — HYDROMORPHONE HYDROCHLORIDE 0.5 MG: 1 INJECTION, SOLUTION INTRAMUSCULAR; INTRAVENOUS; SUBCUTANEOUS at 14:22

## 2017-07-24 RX ADMIN — DEXAMETHASONE SODIUM PHOSPHATE 8 MG: 10 INJECTION INTRAMUSCULAR; INTRAVENOUS at 10:35

## 2017-07-24 NOTE — INTERVAL H&P NOTE
H&P reviewed. The patient was examined and there are no changes to the H&P.    Julianna Saenz MD  7/24/2017  7:27 AM

## 2017-07-24 NOTE — OP NOTE
Preop Dx: Pelvic pain    Post op Dx: same    Surgeon: Dr Saenz    Assistant: Dr Mack    Anesthesia: General    Complications: None    Findings: uterus, nl tubes and ovaries. Nl bladder mucosa, bilateral urethral orifices with efflux of urine.    Procedure: DaVinci assisted TLH, Bilateral Salpingectomy, Left oophorectomy and Diagnostic Cystoscopy    Detail of Procedure:     After appropriate consents were obtained, pt was taken to the operating room where anesthesia was found to be adequate. Pt was placed in dorsal lithotomy position, arms tucked in and prepped and draped in usual fashion. Attention was then placed to the perineum where a pabon was placed, followed by uterine manipulator. Then attention was placed to the abdomen where pt was flat and Veress needle introduced and PCO2 in sulfated to archive 25 mmHg. Then under direct visualization 10 mm trocar was introduced and identification of intraperitoneal placement was achieved. Pt then placed in steep trendelenburg and two 8 mm trocars where introduced under direct visualization on right and left lower abdominal area. Then a 10 mm trocar was introduced on right upper abd area. The Da Shiv was then docked and using the monopolar scissors and the fenestrated bipolar, bilateral tubes where identified, cauterized and removed. Then the infundibulo pelvic ligament on the left side was cauterize and left ovary removed. Left ureter was traced before cauterizing ligament. Then the round ligament was cauterized and cut and the broad ligament was dissected off. Bilateral uterine arteries where skeletonization and cauterized and transected. Hemostasis was assured. The Cardinal ligaments were dissected all the way down to uterosacral lig and that level the cervix was cored all around. The uterus and cervix was then removed from the vagina without difficulty. The pelvis was copiously irrigated. V lock suture used to run the cuff from each end meeting at the middle,  taking 1 cm from anterior and 1 cm from pos vaginal cuff edges. Hemostasis assured. Poor man cystoscopy performed with findings as above. All instruments where removed from vagina and abdomen. The fascia of the 10 mm incision where closed with 0 vicryl, followed by skin closure with 4-0 vicryl.   Sponge, laps and needle counts correct x 2. Pt taken to recovery in stable condition. Family updated on the status of pt by me.

## 2017-07-24 NOTE — ANESTHESIA PREPROCEDURE EVALUATION
Anesthesia Evaluation     no history of anesthetic complications:         Airway   Mallampati: I  TM distance: >3 FB  Neck ROM: full  no difficulty expected  Dental    (+) upper dentures    Pulmonary    (+) a smoker Current, COPD, asthma, sleep apnea,   Cardiovascular     (+) hypertension, dysrhythmias (tachy, during panic attack),   (-) angina      Neuro/Psych  GI/Hepatic/Renal/Endo    (+)  GERD, PUD,     Musculoskeletal     Abdominal    Substance History      OB/GYN          Other                                        Anesthesia Plan    ASA 2     general     intravenous induction   Anesthetic plan and risks discussed with patient.

## 2017-07-24 NOTE — ANESTHESIA POSTPROCEDURE EVALUATION
Patient: Terra Deutsch    Procedure Summary     Date Anesthesia Start Anesthesia Stop Room / Location    07/24/17 1021 1324  [facility] / [facility]       Procedure Diagnosis Surgeon Provider    TOTAL LAPAROSCOPIC HYSTERECTOMY WITH DAVINCI ROBOT MATTI SALPINGECTOMY LEFT OOPHORECTOMY (Bilateral Abdomen) No diagnosis on file. MD Patrica Beltran MD          Anesthesia Type: general  Last vitals  BP   121/85 (07/24/17 1450)    Temp        Pulse   96 (07/24/17 1450)   Resp   14 (07/24/17 1450)    SpO2   94 % (07/24/17 1450)      Post Anesthesia Care and Evaluation    Patient location during evaluation: PACU  Anesthetic complications: No anesthetic complications

## 2017-07-24 NOTE — PERIOPERATIVE NURSING NOTE
Dr. Saenz at bedside, notified of WBC of 12.73.  MD reviewed consent with pt, orders noted for abx and SCDs.

## 2017-07-24 NOTE — PLAN OF CARE
Problem: Patient Care Overview (Adult)  Goal: Plan of Care Review  Outcome: Ongoing (interventions implemented as appropriate)    07/24/17 0831   Coping/Psychosocial Response Interventions   Plan Of Care Reviewed With patient   Patient Care Overview   Progress no change       Goal: Adult Individualization and Mutuality  Outcome: Ongoing (interventions implemented as appropriate)    07/24/17 0831   Individualization   Patient Specific Preferences none       Goal: Discharge Needs Assessment  Outcome: Ongoing (interventions implemented as appropriate)    07/24/17 0831   Discharge Needs Assessment   Concerns To Be Addressed no discharge needs identified         Problem: Perioperative Period (Adult)  Goal: Signs and Symptoms of Listed Potential Problems Will be Absent or Manageable (Perioperative Period)  Outcome: Ongoing (interventions implemented as appropriate)    07/24/17 0831   Perioperative Period   Problems Assessed (Perioperative Period) all   Problems Present (Perioperative Period) pain;physiologic stress response

## 2017-07-24 NOTE — ANESTHESIA PROCEDURE NOTES
Airway  Urgency: elective    Date/Time: 7/24/2017 10:33 AM  Airway not difficult    General Information and Staff    Patient location during procedure: OR  Anesthesiologist: SHELIA WORKMAN  CRNA: NICKY DAVIDSON    Indications and Patient Condition  Indications for airway management: airway protection    Preoxygenated: yes  Mask difficulty assessment: 2 - vent by mask + OA or adjuvant +/- NMBA    Final Airway Details  Final airway type: endotracheal airway      Successful airway: ETT  Cuffed: yes   Successful intubation technique: direct laryngoscopy  Facilitating devices/methods: intubating stylet and cricoid pressure  Endotracheal tube insertion site: oral  Blade: Mendoza  Blade size: #2  ETT size: 7.0 mm  Cormack-Lehane Classification: grade I - full view of glottis  Placement verified by: chest auscultation and capnometry   Measured from: teeth  ETT to teeth (cm): 21  Number of attempts at approach: 1    Additional Comments  Atraumatic. No dental damage noted.

## 2017-07-25 ENCOUNTER — TELEPHONE (OUTPATIENT)
Dept: GASTROENTEROLOGY | Facility: CLINIC | Age: 37
End: 2017-07-25

## 2017-07-25 LAB
ANION GAP SERPL CALCULATED.3IONS-SCNC: 12.3 MMOL/L
BACTERIA UR QL AUTO: ABNORMAL /HPF
BILIRUB UR QL STRIP: NEGATIVE
BUN BLD-MCNC: 7 MG/DL (ref 6–20)
BUN/CREAT SERPL: 9.5 (ref 7–25)
CALCIUM SPEC-SCNC: 8.6 MG/DL (ref 8.6–10.5)
CHLORIDE SERPL-SCNC: 101 MMOL/L (ref 98–107)
CLARITY UR: CLEAR
CO2 SERPL-SCNC: 25.7 MMOL/L (ref 22–29)
COLOR UR: YELLOW
CREAT BLD-MCNC: 0.74 MG/DL (ref 0.57–1)
DEPRECATED RDW RBC AUTO: 46.5 FL (ref 37–54)
ERYTHROCYTE [DISTWIDTH] IN BLOOD BY AUTOMATED COUNT: 12.9 % (ref 11.7–13)
GFR SERPL CREATININE-BSD FRML MDRD: 88 ML/MIN/1.73
GLUCOSE BLD-MCNC: 153 MG/DL (ref 65–99)
GLUCOSE UR STRIP-MCNC: NEGATIVE MG/DL
HCT VFR BLD AUTO: 41.2 % (ref 35.6–45.5)
HGB BLD-MCNC: 13.3 G/DL (ref 11.9–15.5)
HGB UR QL STRIP.AUTO: ABNORMAL
HYALINE CASTS UR QL AUTO: ABNORMAL /LPF
KETONES UR QL STRIP: NEGATIVE
LAB AP CASE REPORT: NORMAL
LEUKOCYTE ESTERASE UR QL STRIP.AUTO: NEGATIVE
Lab: NORMAL
MCH RBC QN AUTO: 31.8 PG (ref 26.9–32)
MCHC RBC AUTO-ENTMCNC: 32.3 G/DL (ref 32.4–36.3)
MCV RBC AUTO: 98.6 FL (ref 80.5–98.2)
NITRITE UR QL STRIP: NEGATIVE
PATH REPORT.FINAL DX SPEC: NORMAL
PATH REPORT.GROSS SPEC: NORMAL
PH UR STRIP.AUTO: 6 [PH] (ref 5–8)
PLATELET # BLD AUTO: 303 10*3/MM3 (ref 140–500)
PMV BLD AUTO: 10 FL (ref 6–12)
POTASSIUM BLD-SCNC: 4.1 MMOL/L (ref 3.5–5.2)
PROT UR QL STRIP: NEGATIVE
RBC # BLD AUTO: 4.18 10*6/MM3 (ref 3.9–5.2)
RBC # UR: ABNORMAL /HPF
REF LAB TEST METHOD: ABNORMAL
SODIUM BLD-SCNC: 139 MMOL/L (ref 136–145)
SP GR UR STRIP: 1.01 (ref 1–1.03)
SQUAMOUS #/AREA URNS HPF: ABNORMAL /HPF
UROBILINOGEN UR QL STRIP: ABNORMAL
WBC NRBC COR # BLD: 22.27 10*3/MM3 (ref 4.5–10.7)
WBC UR QL AUTO: ABNORMAL /HPF

## 2017-07-25 PROCEDURE — G0378 HOSPITAL OBSERVATION PER HR: HCPCS

## 2017-07-25 PROCEDURE — 85027 COMPLETE CBC AUTOMATED: CPT | Performed by: OBSTETRICS & GYNECOLOGY

## 2017-07-25 PROCEDURE — 80048 BASIC METABOLIC PNL TOTAL CA: CPT | Performed by: OBSTETRICS & GYNECOLOGY

## 2017-07-25 PROCEDURE — 25010000002 ONDANSETRON PER 1 MG: Performed by: OBSTETRICS & GYNECOLOGY

## 2017-07-25 PROCEDURE — 81001 URINALYSIS AUTO W/SCOPE: CPT | Performed by: PHYSICIAN ASSISTANT

## 2017-07-25 RX ORDER — IBUPROFEN 600 MG/1
600 TABLET ORAL EVERY 6 HOURS PRN
Status: DISCONTINUED | OUTPATIENT
Start: 2017-07-25 | End: 2017-07-26 | Stop reason: HOSPADM

## 2017-07-25 RX ORDER — LORAZEPAM 0.5 MG/1
0.5 TABLET ORAL 3 TIMES DAILY PRN
Status: DISCONTINUED | OUTPATIENT
Start: 2017-07-25 | End: 2017-07-26 | Stop reason: HOSPADM

## 2017-07-25 RX ORDER — POLYETHYLENE GLYCOL 3350 17 G/17G
17 POWDER, FOR SOLUTION ORAL 2 TIMES DAILY
Status: DISCONTINUED | OUTPATIENT
Start: 2017-07-25 | End: 2017-07-25

## 2017-07-25 RX ORDER — LORAZEPAM 1 MG/1
1 TABLET ORAL 3 TIMES DAILY PRN
Status: DISCONTINUED | OUTPATIENT
Start: 2017-07-25 | End: 2017-07-26 | Stop reason: HOSPADM

## 2017-07-25 RX ORDER — PANTOPRAZOLE SODIUM 40 MG/1
40 TABLET, DELAYED RELEASE ORAL EVERY MORNING
Status: DISCONTINUED | OUTPATIENT
Start: 2017-07-26 | End: 2017-07-26 | Stop reason: HOSPADM

## 2017-07-25 RX ADMIN — OXYCODONE HYDROCHLORIDE AND ACETAMINOPHEN 1 TABLET: 7.5; 325 TABLET ORAL at 21:22

## 2017-07-25 RX ADMIN — MAGNESIUM HYDROXIDE 10 ML: 2400 SUSPENSION ORAL at 08:46

## 2017-07-25 RX ADMIN — OXYCODONE HYDROCHLORIDE AND ACETAMINOPHEN 1 TABLET: 7.5; 325 TABLET ORAL at 01:02

## 2017-07-25 RX ADMIN — OXYCODONE HYDROCHLORIDE AND ACETAMINOPHEN 1 TABLET: 7.5; 325 TABLET ORAL at 08:46

## 2017-07-25 RX ADMIN — SODIUM CHLORIDE, POTASSIUM CHLORIDE, SODIUM LACTATE AND CALCIUM CHLORIDE 100 ML/HR: 600; 310; 30; 20 INJECTION, SOLUTION INTRAVENOUS at 02:42

## 2017-07-25 RX ADMIN — OXYCODONE HYDROCHLORIDE AND ACETAMINOPHEN 1 TABLET: 7.5; 325 TABLET ORAL at 16:56

## 2017-07-25 RX ADMIN — IBUPROFEN 600 MG: 600 TABLET ORAL at 11:01

## 2017-07-25 RX ADMIN — OXYCODONE HYDROCHLORIDE AND ACETAMINOPHEN 1 TABLET: 7.5; 325 TABLET ORAL at 12:26

## 2017-07-25 RX ADMIN — ONDANSETRON 4 MG: 2 INJECTION INTRAMUSCULAR; INTRAVENOUS at 02:42

## 2017-07-25 RX ADMIN — LORAZEPAM 0.5 MG: 0.5 TABLET ORAL at 14:15

## 2017-07-25 RX ADMIN — IBUPROFEN 600 MG: 600 TABLET ORAL at 18:16

## 2017-07-25 RX ADMIN — OXYCODONE HYDROCHLORIDE AND ACETAMINOPHEN 1 TABLET: 7.5; 325 TABLET ORAL at 05:09

## 2017-07-25 NOTE — PROGRESS NOTES
[facility]  POST OP  PROGRESS NOTE    Patient Name: Terra Deutsch  :  1980  MRN:  [id number]      POD1   Subjective     Patient reports:    Pt c/o pain. Denies nausea and vomiting. Tolerating po. Voiding and ambulating without difficulty.       Objective       Vitals: Vital Signs Range for the last 24 hours  Temperature: Temp:  [97 °F (36.1 °C)-100.1 °F (37.8 °C)] 98.3 °F (36.8 °C)       BP: BP: (105-150)/() 112/69   Pulse: Heart Rate:  [] 57   Respirations: Resp:  [14-18] 16         Intake/Output Summary (Last 24 hours) at 17 0833  Last data filed at 17 0748   Gross per 24 hour   Intake             4115 ml   Output             4650 ml   Net             -535 ml                                             Physical Exam     General  Lungs:   Alert in NAD  CTA b/l    Abdomen Soft, non-distended, appropriately tender    Incision  Clean, dry and intact     Extremities Calves NT bilaterally          Lab results reviewed:  CBC: Lab Results   Component Value Date    WBC 22.27 (H) 2017    HGB 13.3 2017    HCT 41.2 2017          Assessment/Plan     POD 1 - Doing well. Hemodynamically stable. Intraoperative findings reviewed with the patient.   Elevated WBC      Plan:  Pt c/o pain at this time. PE exam normal for 1 day post op, passing flatus, voiding normally. No abdominal distention. Miguel recheck cbc prior to d/c. Afebrile. Will get UA as well. Can discharge once pain well controlled and if wbc dec, d/w may be in am       Active Problems:    Pelvic pain                   IFTIKHAR Chavarria  2017  8:33 AM    Patient seen and examined. Agree with above assessment.   Julianna Saenz MD  2017  11:29 AM

## 2017-07-25 NOTE — PLAN OF CARE
Problem: Patient Care Overview (Adult)  Goal: Plan of Care Review  Outcome: Ongoing (interventions implemented as appropriate)    07/25/17 5317   Coping/Psychosocial Response Interventions   Plan Of Care Reviewed With patient   Patient Care Overview   Progress improving   Outcome Evaluation   Outcome Summary/Follow up Plan pt doing well, voided without difficulty, up ad mekhi ambulating, IS at bedside, med with percocet/motrin as ordered, afebrile, vss         Problem: Perioperative Period (Adult)  Goal: Signs and Symptoms of Listed Potential Problems Will be Absent or Manageable (Perioperative Period)  Outcome: Ongoing (interventions implemented as appropriate)    Problem: Hysterectomy (Adult)  Goal: Signs and Symptoms of Listed Potential Problems Will be Absent or Manageable (Hysterectomy)  Outcome: Ongoing (interventions implemented as appropriate)

## 2017-07-25 NOTE — TELEPHONE ENCOUNTER
----- Message from Cherrie Lazaro MD sent at 7/13/2017  2:21 PM EDT -----  One of the 2 polyps removed was an adenoma.  The other one was a benign polyp.  In the absence of symptoms she is due for repeat colonoscopy in 5 years.  Please place in 5 year recall.    No evidence of celiac disease in her small intestine.  Mild gastritis in her stomach body. no evidence of H. Pylori.    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  We'll send for further HPV testing and follow that up.

## 2017-07-25 NOTE — PLAN OF CARE
Problem: Patient Care Overview (Adult)  Goal: Plan of Care Review  Outcome: Ongoing (interventions implemented as appropriate)    07/25/17 0251   Coping/Psychosocial Response Interventions   Plan Of Care Reviewed With patient   Patient Care Overview   Progress progress toward functional goals as expected   Outcome Evaluation   Outcome Summary/Follow up Plan vss, ivf infusing, lap sites x4 CDI, ambulated tonight, tolerating regular diet, pain controlled with po painmed, medicated for nausea, f/c out in am       Goal: Adult Individualization and Mutuality  Outcome: Ongoing (interventions implemented as appropriate)  Goal: Discharge Needs Assessment  Outcome: Ongoing (interventions implemented as appropriate)    Problem: Perioperative Period (Adult)  Goal: Signs and Symptoms of Listed Potential Problems Will be Absent or Manageable (Perioperative Period)  Outcome: Ongoing (interventions implemented as appropriate)    Problem: Hysterectomy (Adult)  Goal: Signs and Symptoms of Listed Potential Problems Will be Absent or Manageable (Hysterectomy)  Outcome: Ongoing (interventions implemented as appropriate)

## 2017-07-26 VITALS
HEART RATE: 58 BPM | BODY MASS INDEX: 36.75 KG/M2 | DIASTOLIC BLOOD PRESSURE: 69 MMHG | TEMPERATURE: 97 F | HEIGHT: 63 IN | SYSTOLIC BLOOD PRESSURE: 103 MMHG | WEIGHT: 207.4 LBS | RESPIRATION RATE: 16 BRPM | OXYGEN SATURATION: 99 %

## 2017-07-26 LAB
BASOPHILS # BLD AUTO: 0.03 10*3/MM3 (ref 0–0.2)
BASOPHILS NFR BLD AUTO: 0.2 % (ref 0–1.5)
DEPRECATED RDW RBC AUTO: 47.5 FL (ref 37–54)
EOSINOPHIL # BLD AUTO: 0.07 10*3/MM3 (ref 0–0.7)
EOSINOPHIL NFR BLD AUTO: 0.5 % (ref 0.3–6.2)
ERYTHROCYTE [DISTWIDTH] IN BLOOD BY AUTOMATED COUNT: 13 % (ref 11.7–13)
HCT VFR BLD AUTO: 37.1 % (ref 35.6–45.5)
HGB BLD-MCNC: 11.7 G/DL (ref 11.9–15.5)
IMM GRANULOCYTES # BLD: 0.03 10*3/MM3 (ref 0–0.03)
IMM GRANULOCYTES NFR BLD: 0.2 % (ref 0–0.5)
LYMPHOCYTES # BLD AUTO: 3.77 10*3/MM3 (ref 0.9–4.8)
LYMPHOCYTES NFR BLD AUTO: 26.8 % (ref 19.6–45.3)
MCH RBC QN AUTO: 31.4 PG (ref 26.9–32)
MCHC RBC AUTO-ENTMCNC: 31.5 G/DL (ref 32.4–36.3)
MCV RBC AUTO: 99.5 FL (ref 80.5–98.2)
MONOCYTES # BLD AUTO: 0.87 10*3/MM3 (ref 0.2–1.2)
MONOCYTES NFR BLD AUTO: 6.2 % (ref 5–12)
NEUTROPHILS # BLD AUTO: 9.31 10*3/MM3 (ref 1.9–8.1)
NEUTROPHILS NFR BLD AUTO: 66.1 % (ref 42.7–76)
PLATELET # BLD AUTO: 258 10*3/MM3 (ref 140–500)
PMV BLD AUTO: 9.6 FL (ref 6–12)
RBC # BLD AUTO: 3.73 10*6/MM3 (ref 3.9–5.2)
WBC NRBC COR # BLD: 14.08 10*3/MM3 (ref 4.5–10.7)

## 2017-07-26 PROCEDURE — G0378 HOSPITAL OBSERVATION PER HR: HCPCS

## 2017-07-26 PROCEDURE — 85025 COMPLETE CBC W/AUTO DIFF WBC: CPT | Performed by: PHYSICIAN ASSISTANT

## 2017-07-26 RX ORDER — IBUPROFEN 600 MG/1
600 TABLET ORAL EVERY 6 HOURS PRN
Qty: 90 TABLET | Refills: 1 | Status: SHIPPED | OUTPATIENT
Start: 2017-07-26 | End: 2018-03-28

## 2017-07-26 RX ADMIN — OXYCODONE HYDROCHLORIDE AND ACETAMINOPHEN 1 TABLET: 7.5; 325 TABLET ORAL at 01:45

## 2017-07-26 RX ADMIN — IBUPROFEN 600 MG: 600 TABLET ORAL at 00:15

## 2017-07-26 RX ADMIN — PANTOPRAZOLE SODIUM 40 MG: 40 TABLET, DELAYED RELEASE ORAL at 06:38

## 2017-07-26 RX ADMIN — OXYCODONE HYDROCHLORIDE AND ACETAMINOPHEN 1 TABLET: 7.5; 325 TABLET ORAL at 08:03

## 2017-07-26 RX ADMIN — IBUPROFEN 600 MG: 600 TABLET ORAL at 06:38

## 2017-07-26 NOTE — TELEPHONE ENCOUNTER
Message  Received: 5 days ago       MD Geetha Burch, RN       Cc: Eloisa Newsome RN                     The viral testing of the squamous papilloma in her esophagus was negative which indicates not a precancerous concern

## 2017-07-26 NOTE — DISCHARGE SUMMARY
Date of Discharge:  7/26/2017    Discharge Diagnosis: s/p LAVH with bilateral salpingectomy and Left oophorectomy    Presenting Problem/History of Present Illness  Pelvic pain [R10.2]       Hospital Course  Patient is a 36 y.o. female presented with pelvic pain.      Procedures Performed  Procedure(s):  TOTAL LAPAROSCOPIC HYSTERECTOMY WITH DAVINCI ROBOT MATTI SALPINGECTOMY LEFT OOPHORECTOMY         Condition on Discharge:  Post-Op Day 1  Subjective     Patient reports:    Doing well - ready for discharge. Pain controlled. Tolerating po and having flatus. Voiding and ambulating without difficulty.     Vital Signs  Temp:  [97 °F (36.1 °C)-97.3 °F (36.3 °C)] 97 °F (36.1 °C)  Heart Rate:  [55-68] 58  Resp:  [16] 16  BP: ()/(59-69) 103/69    Physical Exam    Gen Alert and awake   Abdomen Soft,appropriate tenderness   Incision  Dressing intact   Extremities Calves NT bilaterally     Assessment/Plan ]   Assessment:  POD 1  -  Doing well. Stable for discharge.     Plan:    Instructions reviewed       Consults:   Consults     No orders found from 6/25/2017 to 7/25/2017.          Discharge Disposition  Home or Self Care    Discharge Medications   Terra Deutsch   Home Medication Instructions EDDIE:[id number]    Printed on:07/26/17 0806   Medication Information                      busPIRone (BUSPAR) 15 MG tablet  Take 15 mg by mouth 2 (Two) Times a Day.             cetirizine (zyrTEC) 10 MG tablet  Take 10 mg by mouth Daily.             cholecalciferol (VITAMIN D3) 1000 UNITS tablet  Take 2,000 Units by mouth Daily.             ibuprofen (ADVIL,MOTRIN) 600 MG tablet  Take 1 tablet by mouth Every 6 (Six) Hours As Needed for Mild Pain (1-3).             lisinopril-hydrochlorothiazide (PRINZIDE,ZESTORETIC) 10-12.5 MG per tablet  Take 1 tablet by mouth Daily.             LORazepam (ATIVAN) 0.5 MG tablet  Take 0.5 mg by mouth Every 8 (Eight) Hours As Needed for Anxiety (1-2 tabs TID).             omeprazole (priLOSEC) 40 MG  capsule  Take 1 capsule by mouth Every Morning Before Breakfast.             PROAIR  (90 BASE) MCG/ACT inhaler  Inhale 1 puff Every 4 (Four) Hours As Needed.                 The patient has been prescribed a controlled substance.  She has been counseled on the risks associated with using the medication.   The addictive potential of this medication and alternatives were discussed carefully with this patient and she demonstrated understanding.  A [facility] report has been obtained and reviewed.    Activity at Discharge:   Activity Instructions     Pelvic Rest                     Follow-up Appointments  Future Appointments  Date Time Provider Department Center   9/25/2017 9:30 AM Cherrie Lazaro MD [facility] None     Additional Instructions for the Follow-ups that You Need to Schedule     Call MD With Problems / Concerns    As directed    Instructions: heavy bleeding/fever       Discharge Follow-up with Specialty    As directed    Specialty:  OB/GYN   Follow Up:  2 Weeks                 Test Results Pending at Discharge       CIARA Gibson  07/26/17  8:06 AM

## 2017-07-26 NOTE — TELEPHONE ENCOUNTER
Call to pt.  Advise per Dr Lazaro that one of the two polyps removed was an adenoma (NOT cancer, potentially precancerous).  In the absence of symptoms, due for a repeat c/s in 5 yrs.      No evidence of celiac disease in small intestine.  Mild gastritic in stomach body.  No evidence of H pylori.    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  F/u testing was negative, which indicates not a precancerous concern.      Pt verb understanding.

## 2017-07-26 NOTE — PROGRESS NOTES
Continued Stay Note  [facility]     Patient Name: Terra Deutsch  MRN: [id number]  Today's Date: 7/26/2017    Admit Date: 7/24/2017          Discharge Plan       07/26/17 0830    Final Note    Final Note 01: Home w/o needs              Discharge Codes     None        Expected Discharge Date and Time     Expected Discharge Date Expected Discharge Time    Jul 26, 2017             Sugar Bowles RN

## 2017-07-26 NOTE — PLAN OF CARE
Problem: Patient Care Overview (Adult)  Goal: Plan of Care Review  Outcome: Ongoing (interventions implemented as appropriate)    07/26/17 0256   Coping/Psychosocial Response Interventions   Plan Of Care Reviewed With patient   Patient Care Overview   Progress improving   Outcome Evaluation   Outcome Summary/Follow up Plan up ad mekhi, voiding freely, pain controlled with motrin and percocet, frequent use of IS       Goal: Adult Individualization and Mutuality  Outcome: Ongoing (interventions implemented as appropriate)  Goal: Discharge Needs Assessment  Outcome: Ongoing (interventions implemented as appropriate)    Problem: Perioperative Period (Adult)  Goal: Signs and Symptoms of Listed Potential Problems Will be Absent or Manageable (Perioperative Period)  Outcome: Ongoing (interventions implemented as appropriate)    Problem: Hysterectomy (Adult)  Goal: Signs and Symptoms of Listed Potential Problems Will be Absent or Manageable (Hysterectomy)  Outcome: Ongoing (interventions implemented as appropriate)

## 2017-12-20 ENCOUNTER — OFFICE VISIT (OUTPATIENT)
Dept: ORTHOPEDIC SURGERY | Facility: CLINIC | Age: 37
End: 2017-12-20

## 2017-12-20 VITALS — BODY MASS INDEX: 36.68 KG/M2 | TEMPERATURE: 98.4 F | WEIGHT: 207 LBS | HEIGHT: 63 IN

## 2017-12-20 DIAGNOSIS — M48.061 SPINAL STENOSIS OF LUMBAR REGION, UNSPECIFIED WHETHER NEUROGENIC CLAUDICATION PRESENT: Primary | ICD-10-CM

## 2017-12-20 PROCEDURE — 99204 OFFICE O/P NEW MOD 45 MIN: CPT | Performed by: ORTHOPAEDIC SURGERY

## 2017-12-20 RX ORDER — CYCLOBENZAPRINE HCL 10 MG
10 TABLET ORAL 3 TIMES DAILY PRN
COMMUNITY
Start: 2017-10-12

## 2017-12-20 RX ORDER — MELOXICAM 7.5 MG/1
TABLET ORAL
COMMUNITY
Start: 2017-10-12 | End: 2018-01-05

## 2017-12-20 NOTE — PROGRESS NOTES
New patient or new problem visit    Chief Complaint   Patient presents with   • Lumbar Spine - Establish Care, Pain       HPI: She complains of a 5 year history of low back pain which radiates into the left buttock and lower extremity to the foot.  The back pain about equals the leg pain and is severe, constant, stabbing and burning worse with activity.  Anti-inflammatories have helped somewhat but steroids intramuscularly helped more.  Physical therapy ×2 series hasn't helped    PFSH: See chart- reviewed    Review of Systems   Constitutional: Negative for chills, fever and unexpected weight change.   HENT: Negative for trouble swallowing and voice change.    Eyes: Negative for visual disturbance.   Respiratory: Negative for cough and shortness of breath.    Cardiovascular: Negative for chest pain and leg swelling.   Gastrointestinal: Positive for abdominal pain and nausea. Negative for vomiting.   Endocrine: Negative for cold intolerance and heat intolerance.   Genitourinary: Negative for difficulty urinating, frequency and urgency.   Musculoskeletal: Positive for arthralgias and myalgias.   Skin: Negative for rash and wound.   Allergic/Immunologic: Negative for immunocompromised state.   Neurological: Positive for numbness (left leg shooting pain numbness and tingling). Negative for weakness.   Hematological: Does not bruise/bleed easily.   Psychiatric/Behavioral: Positive for sleep disturbance. Negative for dysphoric mood. The patient is not nervous/anxious.        PE: Constitutional: Vital signs above-noted.  Awake, alert and oriented    Psychiatric: Affect and insight do not appear grossly disturbed.    Pulmonary: Breathing is unlabored, color is good.    Skin: Warm, dry and normal turgor    Cardiac:  pedal pulses intact.  No edema.    Eyesight and hearing appear adequate for examination purposes      Musculoskeletal:  There is mild tenderness to percussion and palpation of the spine. Motion appears  undisturbed.  Posture is unremarkable to coronal and sagittal inspection.    The skin about the area is notable for a butterfly in a tree theme.  There is no palpable or visible deformity.  There is no local spasm.       Neurologic:   Reflexes are 2+ and symmetrical in the patellae and diminished in the Achilles.   Motor function is undisturbed in quadriceps, EHL, and gastrocnemius on the right and there is minimal EHL weakness on the left.   Sensation appears symmetrically intact to light touch except for dysesthetic area on the lateral aspect of the left leg..  In the bilateral lower extremities there is no evidence of atrophy.   Clonus is absent..  Gait appears undisturbed.  SLR test negative      MEDICAL DECISION MAKING    XRAY: Plain film x-rays the lumbar spine show L5-S1 disc space narrowing and otherwise minimal degenerative change on 4 views from outside.  I reviewed the radiologist's report with which I agree.    Other: n/a    Impression: Lumbar back pain with radiculopathy, worsening requiring further evaluation    Plan: I plan MRI scan of the lumbar spine with an eye toward epidural steroid injections.  I explained the necessity for the MRI as well as the minimal risk of the epidural injections.  I expect to find stenosis and/or herniated disc possibly see severe compression but based on her neurologic evaluation she should proceed with the epidurals as scheduled so long as these findings are degenerative in nature.  I'll let her know the results of the MRI in the new year when I return to the office.

## 2017-12-31 ENCOUNTER — HOSPITAL ENCOUNTER (OUTPATIENT)
Dept: MRI IMAGING | Facility: HOSPITAL | Age: 37
Discharge: HOME | End: 2017-12-31
Attending: ORTHOPAEDIC SURGERY | Admitting: ORTHOPAEDIC SURGERY

## 2017-12-31 DIAGNOSIS — M48.061 SPINAL STENOSIS OF LUMBAR REGION, UNSPECIFIED WHETHER NEUROGENIC CLAUDICATION PRESENT: ICD-10-CM

## 2017-12-31 PROCEDURE — 72148 MRI LUMBAR SPINE W/O DYE: CPT

## 2018-01-03 ENCOUNTER — TELEPHONE (OUTPATIENT)
Dept: ORTHOPEDIC SURGERY | Facility: CLINIC | Age: 38
End: 2018-01-03

## 2018-01-03 NOTE — TELEPHONE ENCOUNTER
----- Message from Terra Deutsch sent at 1/3/2018 10:49 AM EST -----  Regarding: Non-Urgent Medical Question  Contact: [phone]    MY CHART MESSAGE:    Hi, I had my MRI done on the 31st. I was wanting to know if you have had time to  look at it. And get back with me.

## 2018-01-04 ENCOUNTER — TELEPHONE (OUTPATIENT)
Dept: ORTHOPEDIC SURGERY | Facility: CLINIC | Age: 38
End: 2018-01-04

## 2018-01-04 NOTE — TELEPHONE ENCOUNTER
Let her know that the MRI demonstrates a small disc protrusion on the left side at L5-S1, and that she should go ahead with the epidural injection as previously discussed.  Follow-up in a month or so if symptoms persist

## 2018-01-05 ENCOUNTER — HOSPITAL ENCOUNTER (OUTPATIENT)
Dept: PAIN MEDICINE | Facility: HOSPITAL | Age: 38
Discharge: HOME | End: 2018-01-05
Attending: ORTHOPAEDIC SURGERY | Admitting: ORTHOPAEDIC SURGERY

## 2018-01-05 ENCOUNTER — ANESTHESIA (OUTPATIENT)
Dept: PAIN MEDICINE | Facility: HOSPITAL | Age: 38
End: 2018-01-05

## 2018-01-05 ENCOUNTER — HOSPITAL ENCOUNTER (OUTPATIENT)
Dept: GENERAL RADIOLOGY | Facility: HOSPITAL | Age: 38
Discharge: HOME | End: 2018-01-05

## 2018-01-05 ENCOUNTER — ANESTHESIA EVENT (OUTPATIENT)
Dept: PAIN MEDICINE | Facility: HOSPITAL | Age: 38
End: 2018-01-05

## 2018-01-05 VITALS
HEART RATE: 86 BPM | OXYGEN SATURATION: 98 % | DIASTOLIC BLOOD PRESSURE: 87 MMHG | SYSTOLIC BLOOD PRESSURE: 139 MMHG | BODY MASS INDEX: 36.68 KG/M2 | TEMPERATURE: 98.5 F | RESPIRATION RATE: 16 BRPM | WEIGHT: 207 LBS | HEIGHT: 63 IN

## 2018-01-05 DIAGNOSIS — R52 PAIN: ICD-10-CM

## 2018-01-05 DIAGNOSIS — M48.061 SPINAL STENOSIS OF LUMBAR REGION, UNSPECIFIED WHETHER NEUROGENIC CLAUDICATION PRESENT: ICD-10-CM

## 2018-01-05 PROCEDURE — C1755 CATHETER, INTRASPINAL: HCPCS

## 2018-01-05 PROCEDURE — 25010000002 MIDAZOLAM PER 1 MG: Performed by: ANESTHESIOLOGY

## 2018-01-05 PROCEDURE — 77003 FLUOROGUIDE FOR SPINE INJECT: CPT

## 2018-01-05 PROCEDURE — 25010000002 METHYLPREDNISOLONE PER 80 MG: Performed by: ANESTHESIOLOGY

## 2018-01-05 PROCEDURE — 25010000002 FENTANYL CITRATE (PF) 100 MCG/2ML SOLUTION: Performed by: ANESTHESIOLOGY

## 2018-01-05 RX ORDER — FENTANYL CITRATE 50 UG/ML
50 INJECTION, SOLUTION INTRAMUSCULAR; INTRAVENOUS AS NEEDED
Status: DISCONTINUED | OUTPATIENT
Start: 2018-01-05 | End: 2018-01-06 | Stop reason: HOSPADM

## 2018-01-05 RX ORDER — LIDOCAINE HYDROCHLORIDE 10 MG/ML
1 INJECTION, SOLUTION INFILTRATION; PERINEURAL ONCE AS NEEDED
Status: DISCONTINUED | OUTPATIENT
Start: 2018-01-05 | End: 2018-01-06 | Stop reason: HOSPADM

## 2018-01-05 RX ORDER — MIDAZOLAM HYDROCHLORIDE 1 MG/ML
1 INJECTION INTRAMUSCULAR; INTRAVENOUS AS NEEDED
Status: DISCONTINUED | OUTPATIENT
Start: 2018-01-05 | End: 2018-01-06 | Stop reason: HOSPADM

## 2018-01-05 RX ORDER — SODIUM CHLORIDE 0.9 % (FLUSH) 0.9 %
1-10 SYRINGE (ML) INJECTION AS NEEDED
Status: DISCONTINUED | OUTPATIENT
Start: 2018-01-05 | End: 2018-01-06 | Stop reason: HOSPADM

## 2018-01-05 RX ORDER — METHYLPREDNISOLONE ACETATE 80 MG/ML
80 INJECTION, SUSPENSION INTRA-ARTICULAR; INTRALESIONAL; INTRAMUSCULAR; SOFT TISSUE ONCE
Status: COMPLETED | OUTPATIENT
Start: 2018-01-05 | End: 2018-01-05

## 2018-01-05 RX ADMIN — METHYLPREDNISOLONE ACETATE 80 MG: 80 INJECTION, SUSPENSION INTRA-ARTICULAR; INTRALESIONAL; INTRAMUSCULAR; SOFT TISSUE at 09:36

## 2018-01-05 RX ADMIN — MIDAZOLAM 1 MG: 1 INJECTION INTRAMUSCULAR; INTRAVENOUS at 09:29

## 2018-01-05 RX ADMIN — FENTANYL CITRATE 50 MCG: 50 INJECTION, SOLUTION INTRAMUSCULAR; INTRAVENOUS at 09:29

## 2018-01-05 NOTE — H&P
[facility]    History and Physical    Patient Name: Terra Deutsch  :  1980  MRN:  [id number]  Date of Admission: 2018    Subjective     Patient is a 37 y.o. female presents with chief complaint of chronic, constant, moderate, severe low back and left lower extremity pain.  Onset of symptoms was gradual starting 2 years ago.  Symptoms are associated/aggravated by activity, sitting or stress. Symptoms improve with ice and oral steroids and PT.  On a pain scale from 0-10, she rates her pain as a 5 on a good day and 8 on a bad day.  She describes the pain as burning, stabbing and throbbing in nature.  She says her pain is adversely affected her ability to perform activities of daily living, her ability to exercise, her appetite, her sleep habits and her emotions.  In addition to her pain, she also complains of some subjective weakness in her left lower extremity.    Lumbar MRI results are as follows:    IMPRESSION:  Left paramidline disc bulge with a small extruded disc  fragment posterior to the L5 inferior endplate. The combination narrows  the left lateral recess and posteriorly displaces the traversing left S1  nerve root.        The following portions of the patients history were reviewed and updated as appropriate: current medications, allergies, past medical history, past surgical history, past family history, past social history and problem list                Objective     Past Medical History:   Past Medical History:   Diagnosis Date   • Abdominal pain     LEFT SIDE   • Abnormal CT of the abdomen 2017    1.8cm ovarian cyst   • Anxiety    • Asthma    • COPD (chronic obstructive pulmonary disease)    • COPD (chronic obstructive pulmonary disease)     early signs   • GERD (gastroesophageal reflux disease)    • History of transfusion     POST TUBALIGATION   • Hypertension    • Lower back pain    • Lumbar herniated disc    • Ovarian cyst    • Panic attack    • Sinus drainage    • Sleep apnea   "   NO C-PAP IN USE   • Ulcer 2017    ENDOSCOPY     Past Surgical History:   Past Surgical History:   Procedure Laterality Date   • COLONOSCOPY N/A 7/12/2017    - Internal hemorrhoids. Anal papilla(e) were hypertrophied One 4 mm polyp in the descending colon,One 4 mm polyp in the rectum, removed with a cold biopsy forceps. Resected and retrieved removed with a cold biopsy forceps.   • D&C CERVICAL BIOPSY     • ENDOSCOPY N/A 7/12/2017    Procedure: ESOPHAGOGASTRODUODENOSCOPY with bx;  Surgeon: Cherrie Lazaro MD;  Location: [facility] ENDOSCOPY;  Service:    • TOTAL LAPAROSCOPIC HYSTERECTOMY Bilateral 7/24/2017    Procedure: TOTAL LAPAROSCOPIC HYSTERECTOMY WITH DAVINCI ROBOT MATTI SALPINGECTOMY LEFT OOPHORECTOMY;  Surgeon: Julianna Saenz MD;  Location: [facility] MAIN OR;  Service:    • TUBAL ABDOMINAL LIGATION       Family History:   Family History   Problem Relation Age of Onset   • Heart disease Mother    • Hypertension Mother    • Heart disease Sister    • Malig Hyperthermia Neg Hx      Social History:   Social History   Substance Use Topics   • Smoking status: Current Every Day Smoker     Packs/day: 1.00     Years: 14.00     Types: Cigarettes     Start date: 2000   • Smokeless tobacco: Never Used      Comment: PLANNING ON QUITTING AFTER SX   • Alcohol use No       Vital Signs Range for the last 24 hours  Temperature: Temp:  [36.9 °C (98.5 °F)] 36.9 °C (98.5 °F)   Temp Source: Temp src: Oral   BP: BP: (143)/(105) 143/105   Pulse: Heart Rate:  [84] 84   Respirations: Resp:  [16] 16   SPO2: SpO2:  [100 %] 100 %   O2 Amount (l/min):     O2 Devices O2 Device: room air   Weight: Weight:  [93.9 kg (207 lb)] 93.9 kg (207 lb)     Flowsheet Rows         First Filed Value    Admission Height  160 cm (63\") Documented at 01/05/2018 0902    Admission Weight  93.9 kg (207 lb) Documented at 01/05/2018 0902          --------------------------------------------------------------------------------    Current Outpatient Prescriptions "   Medication Sig Dispense Refill   • busPIRone (BUSPAR) 15 MG tablet Take 15 mg by mouth 3 (Three) Times a Day.     • cetirizine (zyrTEC) 10 MG tablet Take 10 mg by mouth Daily.     • cholecalciferol (VITAMIN D3) 1000 UNITS tablet Take 2,000 Units by mouth Daily.     • cyclobenzaprine (FLEXERIL) 10 MG tablet      • ibuprofen (ADVIL,MOTRIN) 600 MG tablet Take 1 tablet by mouth Every 6 (Six) Hours As Needed for Mild Pain (1-3). 90 tablet 1   • LORazepam (ATIVAN) 0.5 MG tablet Take 0.5 mg by mouth Every 8 (Eight) Hours As Needed for Anxiety (1-2 tabs TID).     • omeprazole (priLOSEC) 40 MG capsule Take 1 capsule by mouth Every Morning Before Breakfast. 30 capsule 2   • PROAIR  (90 BASE) MCG/ACT inhaler Inhale 1 puff Every 4 (Four) Hours As Needed.       No current facility-administered medications for this encounter.        --------------------------------------------------------------------------------  Assessment/Plan      Anesthesia Evaluation            Airway   Mallampati: II  TM distance: >3 FB  Neck ROM: full  Dental - normal exam     Pulmonary - normal exam    breath sounds clear to auscultation  (+) a smoker Current Smoked day of surgery, COPD, asthma, sleep apnea,   Cardiovascular - normal exam    Rhythm: regular  Rate: normal    (+) hypertension,       Neuro/Psych  (+) psychiatric history Anxiety,   left straight leg raise test,     (-) right straight leg raise test  GI/Hepatic/Renal/Endo    (+) obesity,  GERD, PUD,     Musculoskeletal     Abdominal  - normal exam    Abdomen: soft.  Bowel sounds: normal.   Substance History      OB/GYN          Other            Phys Exam Other: NECK:  Supple without adenopathy, JVD or bruits.    EXTREMITIES:  There is no clubbing, cyanosis or edema.  Radial pulses are 1+ and equal bilaterally.  Examination of the lumbar spine shows no marked tenderness or discoloration.    SKIN:  Warm and dry.    NEUROLOGICAL EXAM:  Alert and oriented ×3.  Extraocular muscles intact.   Strength is normal in the lower extremity with respect to dorsal and plantar flexion of the ankles and quadriceps on the right.  She does have decreased strength on her left lower extremity with respect to plantar flexion.  Strength is normal with respect to dorsal flexion and quadriceps.                                       Diagnosis and Plan    Treatment Plan  ASA 3      Procedures: Lumbar Epidural Steroid Injection(LESI), With fluoroscopy,       Anesthetic plan and risks discussed with patient.        Alternative management options include physical therapy, medical management with nonsteroidal anti-inflammatory medications or narcotics, chiropractic manipulation and surgical intervention.    Diagnosis     * Displacement of lumbar intervertebral disc without myelopathy [M51.26]     * Lumbar radiculopathy [M54.16]

## 2018-01-05 NOTE — ANESTHESIA PROCEDURE NOTES
PAIN Epidural block    Patient location during procedure: pain clinic  Start Time: 1/5/2018 9:22 AM  Stop Time: 1/5/2018 9:39 AM  Indication:procedure for pain  Performed By  Anesthesiologist: JORJE CHUNG  Preanesthetic Checklist  Completed: patient identified, site marked, surgical consent, pre-op evaluation, timeout performed, risks and benefits discussed and monitors and equipment checked  Additional Notes  Interlaminar epidural was performed under fluoroscopic guidance.    Diagnosis     * Displacement of lumbar intervertebral disc without myelopathy (M51.26)     * Lumbar radiculopathy (M54.16)  Prep:  Pt Position:prone  Sterile Tech:cap, gloves, mask and sterile barrier  Prep:chlorhexidine gluconate and isopropyl alcohol  Monitoring:blood pressure monitoring, continuous pulse oximetry and EKG  Procedure:  Sedation: yes   Approach:left paramedian  Guidance: fluoroscopy  Location:lumbar  Interspace: L5-S1.  Needle Type:Tuohy  Needle Gauge:20 G  Aspiration:negative  Medications:  Depomedrol:80 mg  Preservative Free Saline:3mL  Isovue:0mL    Post Assessment:  Dressing:occlusive dressing applied  Pt Tolerance:patient tolerated the procedure well with no apparent complications  Complications:no

## 2018-01-09 ENCOUNTER — TRANSCRIBE ORDERS (OUTPATIENT)
Dept: PAIN MEDICINE | Facility: HOSPITAL | Age: 38
End: 2018-01-09

## 2018-01-09 DIAGNOSIS — M48.061 SPINAL STENOSIS OF LUMBAR REGION, UNSPECIFIED WHETHER NEUROGENIC CLAUDICATION PRESENT: Primary | ICD-10-CM

## 2018-01-18 ENCOUNTER — TELEPHONE (OUTPATIENT)
Dept: ORTHOPEDIC SURGERY | Facility: CLINIC | Age: 38
End: 2018-01-18

## 2018-01-18 NOTE — TELEPHONE ENCOUNTER
----- Message from Terra Deutsch sent at 1/17/2018  4:45 PM EST -----  Regarding: RE: Non-Urgent Medical Question  Contact: [phone]  My chart message:      Maximus Holly. Im set up to get another set of shots on January 19th. The shot on the5th. On the 8th i felt more Relief. Then on the 11th. All the pain returned.. Im setup for 2nd set of shots friday 19th  ----- Message -----  From: Omayra LEES  Sent: 1/4/2018  2:25 PM EST  To: Terra Deutsch  Subject: RE: Non-Urgent Medical Question     Venkatesh Valero MD   You 1 hour ago (12:28 PM)            Let her know that the MRI demonstrates a small disc protrusion on the left side at L5-S1, and that she should go ahead with the epidural injection as previously discussed.  Follow-up in a month or so if symptoms persist                Thanks           ----- Message -----     From: Terra Deutsch     Sent: 1/3/2018 10:49 AM EST       To: Venkatesh Valero MD  Subject: Non-Urgent Medical Question    Hi, I had my MRI done on the 31st. I was wanting to know if you have had time to  look at it. And get back with me.

## 2018-01-19 ENCOUNTER — TRANSCRIBE ORDERS (OUTPATIENT)
Dept: PAIN MEDICINE | Facility: HOSPITAL | Age: 38
End: 2018-01-19

## 2018-01-19 ENCOUNTER — ANESTHESIA (OUTPATIENT)
Dept: PAIN MEDICINE | Facility: HOSPITAL | Age: 38
End: 2018-01-19

## 2018-01-19 ENCOUNTER — HOSPITAL ENCOUNTER (OUTPATIENT)
Dept: GENERAL RADIOLOGY | Facility: HOSPITAL | Age: 38
Discharge: HOME | End: 2018-01-19

## 2018-01-19 ENCOUNTER — HOSPITAL ENCOUNTER (OUTPATIENT)
Dept: PAIN MEDICINE | Facility: HOSPITAL | Age: 38
Discharge: HOME | End: 2018-01-19
Admitting: ORTHOPAEDIC SURGERY

## 2018-01-19 ENCOUNTER — ANESTHESIA EVENT (OUTPATIENT)
Dept: PAIN MEDICINE | Facility: HOSPITAL | Age: 38
End: 2018-01-19

## 2018-01-19 VITALS
HEART RATE: 92 BPM | RESPIRATION RATE: 16 BRPM | TEMPERATURE: 98 F | OXYGEN SATURATION: 98 % | DIASTOLIC BLOOD PRESSURE: 99 MMHG | SYSTOLIC BLOOD PRESSURE: 126 MMHG

## 2018-01-19 DIAGNOSIS — R52 PAIN: ICD-10-CM

## 2018-01-19 DIAGNOSIS — M48.061 SPINAL STENOSIS OF LUMBAR REGION, UNSPECIFIED WHETHER NEUROGENIC CLAUDICATION PRESENT: ICD-10-CM

## 2018-01-19 DIAGNOSIS — M48.061 SPINAL STENOSIS OF LUMBAR REGION, UNSPECIFIED WHETHER NEUROGENIC CLAUDICATION PRESENT: Primary | ICD-10-CM

## 2018-01-19 PROCEDURE — 77003 FLUOROGUIDE FOR SPINE INJECT: CPT

## 2018-01-19 PROCEDURE — C1755 CATHETER, INTRASPINAL: HCPCS

## 2018-01-19 PROCEDURE — 25010000002 MIDAZOLAM PER 1 MG: Performed by: ANESTHESIOLOGY

## 2018-01-19 PROCEDURE — 25010000002 METHYLPREDNISOLONE PER 80 MG: Performed by: ANESTHESIOLOGY

## 2018-01-19 PROCEDURE — 25010000002 FENTANYL CITRATE (PF) 100 MCG/2ML SOLUTION: Performed by: ANESTHESIOLOGY

## 2018-01-19 RX ORDER — LIDOCAINE HYDROCHLORIDE 10 MG/ML
0.5 INJECTION, SOLUTION INFILTRATION; PERINEURAL ONCE AS NEEDED
Status: CANCELLED | OUTPATIENT
Start: 2018-02-07

## 2018-01-19 RX ORDER — SODIUM CHLORIDE 0.9 % (FLUSH) 0.9 %
1-10 SYRINGE (ML) INJECTION AS NEEDED
Status: DISCONTINUED | OUTPATIENT
Start: 2018-01-19 | End: 2018-01-20 | Stop reason: HOSPADM

## 2018-01-19 RX ORDER — LIDOCAINE HYDROCHLORIDE 10 MG/ML
1 INJECTION, SOLUTION INFILTRATION; PERINEURAL ONCE AS NEEDED
Status: DISCONTINUED | OUTPATIENT
Start: 2018-01-19 | End: 2018-01-20 | Stop reason: HOSPADM

## 2018-01-19 RX ORDER — METHYLPREDNISOLONE ACETATE 80 MG/ML
80 INJECTION, SUSPENSION INTRA-ARTICULAR; INTRALESIONAL; INTRAMUSCULAR; SOFT TISSUE ONCE
Status: COMPLETED | OUTPATIENT
Start: 2018-01-19 | End: 2018-01-19

## 2018-01-19 RX ORDER — MIDAZOLAM HYDROCHLORIDE 1 MG/ML
1 INJECTION INTRAMUSCULAR; INTRAVENOUS AS NEEDED
Status: DISCONTINUED | OUTPATIENT
Start: 2018-01-19 | End: 2018-01-20 | Stop reason: HOSPADM

## 2018-01-19 RX ORDER — FENTANYL CITRATE 50 UG/ML
50 INJECTION, SOLUTION INTRAMUSCULAR; INTRAVENOUS AS NEEDED
Status: DISCONTINUED | OUTPATIENT
Start: 2018-01-19 | End: 2018-01-20 | Stop reason: HOSPADM

## 2018-01-19 RX ADMIN — METHYLPREDNISOLONE ACETATE 80 MG: 80 INJECTION, SUSPENSION INTRA-ARTICULAR; INTRALESIONAL; INTRAMUSCULAR; SOFT TISSUE at 08:48

## 2018-01-19 RX ADMIN — FENTANYL CITRATE 50 MCG: 50 INJECTION INTRAMUSCULAR; INTRAVENOUS at 08:44

## 2018-01-19 RX ADMIN — MIDAZOLAM 1 MG: 1 INJECTION INTRAMUSCULAR; INTRAVENOUS at 08:44

## 2018-01-19 NOTE — ADDENDUM NOTE
Addendum  created 01/19/18 0901 by Karthikeyan Ruiz MD    Order Reconciliation Section accessed

## 2018-01-19 NOTE — INTERVAL H&P NOTE
[facility]  H&P reviewed. No changes since last visit.  Patient states   50-75% improvement since the last procedure/injection.    Diagnosis     * Displacement of lumbar disc with radiculopathy [M51.16]      Airway assessed since last visit. Airway class equals: 2.

## 2018-01-19 NOTE — PLAN OF CARE
Problem: Pain, Chronic (Adult)  Goal: Acceptable Pain Control/Comfort Level  Outcome: Unable to achieve outcome(s) by discharge Date Met: 01/19/18

## 2018-01-19 NOTE — ANESTHESIA PROCEDURE NOTES
PAIN Epidural block    Patient location during procedure: pain clinic  Start Time: 1/19/2018 8:44 AM  Stop Time: 1/19/2018 8:50 AM  Indication:procedure for pain  Performed By  Anesthesiologist: MONSTER WORKMAN  Preanesthetic Checklist  Completed: patient identified, site marked, surgical consent, pre-op evaluation, timeout performed, IV checked, risks and benefits discussed and monitors and equipment checked  Additional Notes  Post-Op Diagnosis Codes:     * Displacement of lumbar disc with radiculopathy (M51.16)  Performed under fluoroscopic guidance  Prep:  Pt Position:prone  Sterile Tech:cap, gloves, mask and sterile barrier  Prep:chlorhexidine gluconate and isopropyl alcohol  Monitoring:blood pressure monitoring, continuous pulse oximetry and EKG  Procedure:  Sedation: yes   Approach:left paramedian  Guidance: fluoroscopy  Location:lumbar (Intralaminar)  Interspace: L5-S1.  Needle Type:Tuohy  Needle Gauge:20  Aspiration:negative  Medications:  Depomedrol:80  Preservative Free Saline:4mL    Post Assessment:  Post-procedure: Band-aid.  Pt Tolerance:patient tolerated the procedure well with no apparent complications  Complications:no

## 2018-01-19 NOTE — PLAN OF CARE
Problem: Pain, Chronic (Adult)  Goal: Identify Related Risk Factors and Signs and Symptoms  Outcome: Unable to achieve outcome(s) by discharge Date Met: 01/19/18

## 2018-02-09 ENCOUNTER — HOSPITAL ENCOUNTER (OUTPATIENT)
Dept: PAIN MEDICINE | Facility: HOSPITAL | Age: 38
Discharge: HOME | End: 2018-02-09

## 2018-02-12 ENCOUNTER — TELEPHONE (OUTPATIENT)
Dept: ORTHOPEDIC SURGERY | Facility: CLINIC | Age: 38
End: 2018-02-12

## 2018-02-12 NOTE — TELEPHONE ENCOUNTER
JULIETET-  ---- Message from Terra Deutsch sent at 2/11/2018  9:37 AM EST -----  Regarding: Non-Urgent Medical Question  Contact: [phone]  I had to cancel appt on the 9th. [facility]. I've been sick with stip throat. And stomach virus. Really needed shots. I have another appointment set for 19th. At 8:15. Just want to keep you informed. Have a wonderful blessed day

## 2018-02-19 ENCOUNTER — ANESTHESIA EVENT (OUTPATIENT)
Dept: PAIN MEDICINE | Facility: HOSPITAL | Age: 38
End: 2018-02-19

## 2018-02-19 ENCOUNTER — ANESTHESIA (OUTPATIENT)
Dept: PAIN MEDICINE | Facility: HOSPITAL | Age: 38
End: 2018-02-19

## 2018-02-19 ENCOUNTER — HOSPITAL ENCOUNTER (OUTPATIENT)
Dept: GENERAL RADIOLOGY | Facility: HOSPITAL | Age: 38
Discharge: HOME | End: 2018-02-19

## 2018-02-19 ENCOUNTER — HOSPITAL ENCOUNTER (OUTPATIENT)
Dept: PAIN MEDICINE | Facility: HOSPITAL | Age: 38
Discharge: HOME | End: 2018-02-19
Attending: ORTHOPAEDIC SURGERY | Admitting: ORTHOPAEDIC SURGERY

## 2018-02-19 VITALS
OXYGEN SATURATION: 98 % | WEIGHT: 206.79 LBS | HEART RATE: 92 BPM | HEIGHT: 63 IN | SYSTOLIC BLOOD PRESSURE: 121 MMHG | BODY MASS INDEX: 36.64 KG/M2 | TEMPERATURE: 98.3 F | RESPIRATION RATE: 16 BRPM | DIASTOLIC BLOOD PRESSURE: 91 MMHG

## 2018-02-19 DIAGNOSIS — R52 PAIN: ICD-10-CM

## 2018-02-19 PROCEDURE — C1755 CATHETER, INTRASPINAL: HCPCS

## 2018-02-19 PROCEDURE — 77003 FLUOROGUIDE FOR SPINE INJECT: CPT

## 2018-02-19 PROCEDURE — 25010000002 MIDAZOLAM PER 1 MG: Performed by: ANESTHESIOLOGY

## 2018-02-19 PROCEDURE — 0 IOPAMIDOL 41 % SOLUTION: Performed by: ANESTHESIOLOGY

## 2018-02-19 PROCEDURE — 25010000002 METHYLPREDNISOLONE PER 80 MG: Performed by: ANESTHESIOLOGY

## 2018-02-19 PROCEDURE — 25010000002 FENTANYL CITRATE (PF) 100 MCG/2ML SOLUTION: Performed by: ANESTHESIOLOGY

## 2018-02-19 RX ORDER — MIDAZOLAM HYDROCHLORIDE 1 MG/ML
1 INJECTION INTRAMUSCULAR; INTRAVENOUS
Status: DISCONTINUED | OUTPATIENT
Start: 2018-02-19 | End: 2018-02-20 | Stop reason: HOSPADM

## 2018-02-19 RX ORDER — SODIUM CHLORIDE 0.9 % (FLUSH) 0.9 %
1-10 SYRINGE (ML) INJECTION AS NEEDED
Status: DISCONTINUED | OUTPATIENT
Start: 2018-02-19 | End: 2018-02-20 | Stop reason: HOSPADM

## 2018-02-19 RX ORDER — FENTANYL CITRATE 50 UG/ML
50 INJECTION, SOLUTION INTRAMUSCULAR; INTRAVENOUS
Status: DISCONTINUED | OUTPATIENT
Start: 2018-02-19 | End: 2018-02-20 | Stop reason: HOSPADM

## 2018-02-19 RX ORDER — LIDOCAINE HYDROCHLORIDE 10 MG/ML
1 INJECTION, SOLUTION INFILTRATION; PERINEURAL ONCE
Status: DISCONTINUED | OUTPATIENT
Start: 2018-02-19 | End: 2018-02-20 | Stop reason: HOSPADM

## 2018-02-19 RX ORDER — METHYLPREDNISOLONE ACETATE 80 MG/ML
80 INJECTION, SUSPENSION INTRA-ARTICULAR; INTRALESIONAL; INTRAMUSCULAR; SOFT TISSUE ONCE
Status: COMPLETED | OUTPATIENT
Start: 2018-02-19 | End: 2018-02-19

## 2018-02-19 RX ADMIN — METHYLPREDNISOLONE ACETATE 80 MG: 80 INJECTION, SUSPENSION INTRA-ARTICULAR; INTRALESIONAL; INTRAMUSCULAR; SOFT TISSUE at 08:59

## 2018-02-19 RX ADMIN — IOPAMIDOL 10 ML: 408 INJECTION, SOLUTION INTRATHECAL at 08:59

## 2018-02-19 RX ADMIN — FENTANYL CITRATE 50 MCG: 50 INJECTION INTRAMUSCULAR; INTRAVENOUS at 08:55

## 2018-02-19 RX ADMIN — MIDAZOLAM 2 MG: 1 INJECTION INTRAMUSCULAR; INTRAVENOUS at 08:55

## 2018-02-19 NOTE — H&P
[facility]    History and Physical    Patient Name: Terra Deutsch  :  1980  MRN:  [id number]  Date of Admission: 2018    Subjective     Patient is a 37 y.o. female presents with chief complaint of chronic, constant, moderate, severe low back and left lower extremity pain.  Onset of symptoms was gradual starting 2 years ago.  Symptoms are associated/aggravated by activity, sitting or stress. Symptoms improve with ice and oral steroids and PT.  She describes the pain as burning, stabbing and throbbing in nature.  She says her pain is adversely affected her ability to perform activities of daily living, her ability to exercise, her appetite, her sleep habits and her emotions.  In addition to her pain, she also complains of some subjective weakness in her left lower extremity.    Lumbar MRI results are as follows:    IMPRESSION:  Left paramidline disc bulge with a small extruded disc  fragment posterior to the L5 inferior endplate. The combination narrows  the left lateral recess and posteriorly displaces the traversing left S1  nerve root.        The following portions of the patients history were reviewed and updated as appropriate: current medications, allergies, past medical history, past surgical history, past family history, past social history and problem list                Objective     Past Medical History:   Past Medical History:   Diagnosis Date   • Abdominal pain     LEFT SIDE   • Abnormal CT of the abdomen 2017    1.8cm ovarian cyst   • Anxiety    • Asthma    • COPD (chronic obstructive pulmonary disease)    • COPD (chronic obstructive pulmonary disease)     early signs   • GERD (gastroesophageal reflux disease)    • History of transfusion     POST TUBALIGATION   • Hypertension    • Lower back pain    • Lumbar herniated disc    • Ovarian cyst    • Panic attack    • Sinus drainage    • Sleep apnea     NO C-PAP IN USE   • Ulcer 2017    ENDOSCOPY     Past Surgical History:   Past  "Surgical History:   Procedure Laterality Date   • COLONOSCOPY N/A 7/12/2017    - Internal hemorrhoids. Anal papilla(e) were hypertrophied One 4 mm polyp in the descending colon,One 4 mm polyp in the rectum, removed with a cold biopsy forceps. Resected and retrieved removed with a cold biopsy forceps.   • D&C CERVICAL BIOPSY     • ENDOSCOPY N/A 7/12/2017    Procedure: ESOPHAGOGASTRODUODENOSCOPY with bx;  Surgeon: Cherrie Lazaro MD;  Location: [facility] ENDOSCOPY;  Service:    • TOTAL LAPAROSCOPIC HYSTERECTOMY Bilateral 7/24/2017    Procedure: TOTAL LAPAROSCOPIC HYSTERECTOMY WITH DAVINCI ROBOT MATTI SALPINGECTOMY LEFT OOPHORECTOMY;  Surgeon: Julianna Saenz MD;  Location: [facility] MAIN OR;  Service:    • TUBAL ABDOMINAL LIGATION       Family History:   Family History   Problem Relation Age of Onset   • Heart disease Mother    • Hypertension Mother    • Heart disease Sister    • Malig Hyperthermia Neg Hx      Social History:   Social History   Substance Use Topics   • Smoking status: Current Every Day Smoker     Packs/day: 1.00     Years: 14.00     Types: Cigarettes     Start date: 2000   • Smokeless tobacco: Never Used      Comment: PLANNING ON QUITTING AFTER SX   • Alcohol use No       Vital Signs Range for the last 24 hours  Temperature:     Temp Source:     BP:     Pulse:     Respirations:     SPO2:     O2 Amount (l/min):     O2 Devices     Weight:       Flowsheet Rows         First Filed Value    Admission Height  160 cm (63\") Documented at 01/05/2018 0902    Admission Weight  93.9 kg (207 lb) Documented at 01/05/2018 0902          --------------------------------------------------------------------------------    Current Outpatient Prescriptions   Medication Sig Dispense Refill   • busPIRone (BUSPAR) 15 MG tablet Take 15 mg by mouth 3 (Three) Times a Day.     • cetirizine (zyrTEC) 10 MG tablet Take 10 mg by mouth Daily.     • cholecalciferol (VITAMIN D3) 1000 UNITS tablet Take 2,000 Units by mouth Daily.     • " cyclobenzaprine (FLEXERIL) 10 MG tablet      • ibuprofen (ADVIL,MOTRIN) 600 MG tablet Take 1 tablet by mouth Every 6 (Six) Hours As Needed for Mild Pain (1-3). 90 tablet 1   • LORazepam (ATIVAN) 0.5 MG tablet Take 0.5 mg by mouth Every 8 (Eight) Hours As Needed for Anxiety (1-2 tabs TID).     • omeprazole (priLOSEC) 40 MG capsule Take 1 capsule by mouth Every Morning Before Breakfast. 30 capsule 2   • PROAIR  (90 BASE) MCG/ACT inhaler Inhale 1 puff Every 4 (Four) Hours As Needed.       No current facility-administered medications for this encounter.        --------------------------------------------------------------------------------  Assessment/Plan      Anesthesia Evaluation     Patient summary reviewed and Nursing notes reviewed   NPO Solid Status: > 6 hours  NPO Liquid Status: > 6 hours    Pain impairs ability to perform ADLs: Sleeping  Modalities previously tried to control pain with limited effectiveness: Ice and Rest     Airway   Mallampati: II  TM distance: >3 FB  Neck ROM: full  Dental - normal exam     Pulmonary - normal exam    breath sounds clear to auscultation  (+) a smoker Current Smoked day of surgery, COPD, asthma, sleep apnea,   Cardiovascular - normal exam    Rhythm: regular  Rate: normal    (+) hypertension,       Neuro/Psych  (+) psychiatric history Anxiety,   left straight leg raise test,     (-) right straight leg raise test  GI/Hepatic/Renal/Endo    (+) obesity,  GERD, PUD,     Musculoskeletal     (+) back pain, radiculopathy  Abdominal  - normal exam    Abdomen: soft.  Bowel sounds: normal.   Substance History      OB/GYN          Other            Phys Exam Other: NECK:  Supple without adenopathy, JVD or bruits.    EXTREMITIES:  There is no clubbing, cyanosis or edema.  Radial pulses are 1+ and equal bilaterally.  Examination of the lumbar spine shows no marked tenderness or discoloration.    SKIN:  Warm and dry.    NEUROLOGICAL EXAM:  Alert and oriented ×3.  Extraocular muscles  intact.  Strength is normal in the lower extremity with respect to dorsal and plantar flexion of the ankles and quadriceps on the right.  She does have decreased strength on her left lower extremity with respect to plantar flexion.  Strength is normal with respect to dorsal flexion and quadriceps.               Diagnosis and Plan      Treatment Plan  ASA 3   Patient has had previous injection/procedure with 25-50% improvement.   Procedures: Lumbar Epidural Steroid Injection(LESI), With fluoroscopy,       Anesthetic plan and risks discussed with patient.        Alternative management options include physical therapy, medical management with nonsteroidal anti-inflammatory medications or narcotics, chiropractic manipulation and surgical intervention.    Diagnosis     * Lumbar radiculopathy [M54.16]     * Lumbar disc displacement without myelopathy [M51.26]

## 2018-02-19 NOTE — ANESTHESIA PROCEDURE NOTES
PAIN Epidural block    Patient location during procedure: pain clinic  Start Time: 2/19/2018 8:47 AM  Stop Time: 2/19/2018 9:03 AM  Indication:procedure for pain  Performed By  Anesthesiologist: LIDIA MEHTA  Preanesthetic Checklist  Completed: patient identified, site marked, surgical consent, pre-op evaluation, timeout performed, risks and benefits discussed and monitors and equipment checked  Additional Notes  Depomedrol - 80mg    Needle position confirmed by fluoroscopy and epidurogram using 2cc of [id number].    Diagnosis  Post-Op Diagnosis Codes:     * Lumbar radiculopathy (M54.16)     * Lumbar disc displacement without myelopathy (M51.26)    Prep:  Pt Position:prone  Sterile Tech:cap, gloves, mask and sterile barrier  Prep:chlorhexidine gluconate and isopropyl alcohol  Monitoring:blood pressure monitoring, continuous pulse oximetry and EKG  Procedure:  Sedation: no   Approach:left paramedian  Guidance: fluoroscopy  Location:lumbar  Level:5-6  Needle Type:Tuohy  Needle Gauge:20  Aspiration:negative  Medications:  Depomedrol:80 mg  Preservative Free Saline:2mL  Isovue:2mL    Post Assessment:  Post-procedure: bandaide.  Pt Tolerance:patient tolerated the procedure well with no apparent complications  Complications:no

## 2018-02-21 ENCOUNTER — TELEPHONE (OUTPATIENT)
Dept: ORTHOPEDIC SURGERY | Facility: CLINIC | Age: 38
End: 2018-02-21

## 2018-02-21 NOTE — TELEPHONE ENCOUNTER
Patient had 3rd epidural on Monday 2/19 and did not get any relief. She did not get a whole lot with the first 2. Wants to know what JGW suggest next.

## 2018-03-07 ENCOUNTER — OFFICE VISIT (OUTPATIENT)
Dept: ORTHOPEDIC SURGERY | Facility: CLINIC | Age: 38
End: 2018-03-07

## 2018-03-07 VITALS — HEIGHT: 65 IN | TEMPERATURE: 98.7 F | BODY MASS INDEX: 35.65 KG/M2 | WEIGHT: 214 LBS

## 2018-03-07 DIAGNOSIS — M51.26 HERNIATED LUMBAR INTERVERTEBRAL DISC: ICD-10-CM

## 2018-03-07 DIAGNOSIS — M43.16 SPONDYLOLISTHESIS OF LUMBAR REGION: ICD-10-CM

## 2018-03-07 DIAGNOSIS — M48.061 SPINAL STENOSIS OF LUMBAR REGION, UNSPECIFIED WHETHER NEUROGENIC CLAUDICATION PRESENT: Primary | ICD-10-CM

## 2018-03-07 PROCEDURE — 72100 X-RAY EXAM L-S SPINE 2/3 VWS: CPT | Performed by: ORTHOPAEDIC SURGERY

## 2018-03-07 PROCEDURE — 99214 OFFICE O/P EST MOD 30 MIN: CPT | Performed by: ORTHOPAEDIC SURGERY

## 2018-03-07 RX ORDER — SODIUM CHLORIDE, SODIUM LACTATE, POTASSIUM CHLORIDE, CALCIUM CHLORIDE 600; 310; 30; 20 MG/100ML; MG/100ML; MG/100ML; MG/100ML
100 INJECTION, SOLUTION INTRAVENOUS CONTINUOUS
Status: CANCELLED | OUTPATIENT
Start: 2018-04-02

## 2018-03-07 NOTE — PROGRESS NOTES
She complains of continued back and leg pain predominantly the latter but a significant amount of axial pain.  She is failed to sustain lasting relief with epidural injections which did help significantly temporarily especially after the second one.  She is undergone physical therapy and has been dealing with this for nearly 5 years.  On examination she has some numbness in the lateral aspect of the left leg, slight weakness of dorsiflexion of the left foot, positive straight leg raise test and slight weakening of Achilles reflex.  Lumbar MRI demonstrates disc protrusion on the left side at L5-S1 and 4 mm of retrolisthesis and significant disc space flattening is noted on plain film x-ray of the lumbar spine.  The remainder of the spine looks quite good on both plain films and MRI and I reviewed the reports thereof.  I think she is a reasonable candidate for lumbosacral discectomy and fusion with instrumentation.I discussed the risks and benefits of posterior spinal fusion, including where applicable, laminectomy.  Risks include adverse anesthetic events such as death, stroke and myocardial infarction.  More specific surgical risks include infection, nonunion, hardware failure, spinal fluid leakage, nerve root injury or paralysis, visceral or vascular injury, persistent pain, deep venous thrombosis, and pulmonary embolism among others. There is a 70 to 90 % chance of success.   Alternatives have been discussed.  After careful consideration the patient wishes to proceed with surgery.  25 minutes was spent in face-to-face consultation.

## 2018-03-19 ENCOUNTER — PREP FOR SURGERY (OUTPATIENT)
Dept: OTHER | Facility: HOSPITAL | Age: 38
End: 2018-03-19

## 2018-03-19 ENCOUNTER — TELEPHONE (OUTPATIENT)
Dept: ORTHOPEDIC SURGERY | Facility: CLINIC | Age: 38
End: 2018-03-19

## 2018-03-19 DIAGNOSIS — M48.062 SPINAL STENOSIS OF LUMBAR REGION WITH NEUROGENIC CLAUDICATION: Primary | ICD-10-CM

## 2018-03-19 RX ORDER — CEFAZOLIN SODIUM 2 G/100ML
2 INJECTION, SOLUTION INTRAVENOUS ONCE
Status: CANCELLED | OUTPATIENT
Start: 2018-03-19 | End: 2018-03-19

## 2018-03-19 RX ORDER — SODIUM CHLORIDE, SODIUM LACTATE, POTASSIUM CHLORIDE, CALCIUM CHLORIDE 600; 310; 30; 20 MG/100ML; MG/100ML; MG/100ML; MG/100ML
100 INJECTION, SOLUTION INTRAVENOUS CONTINUOUS
Status: CANCELLED | OUTPATIENT
Start: 2018-03-19

## 2018-03-19 RX ORDER — SODIUM CHLORIDE 0.9 % (FLUSH) 0.9 %
1-10 SYRINGE (ML) INJECTION AS NEEDED
Status: CANCELLED | OUTPATIENT
Start: 2018-03-19

## 2018-03-19 NOTE — TELEPHONE ENCOUNTER
----- Message from Terra Deutsch sent at 3/19/2018  3:01 PM EDT -----  Regarding: Non-Urgent Medical Question  Contact: [phone]  MY CHART MESSAGE:    Hello, I was there to see u on the 7th of march. We. Had decided to go with back surgery.  I called my [employer]. And no request has been sent. Was wanting to find out if you still plan on sending. Im miserable In pain. So ready to have this done. Please respond when you can. Thank you. Have a wonderful blessed day

## 2018-03-19 NOTE — TELEPHONE ENCOUNTER
I have spoken with this patient and she would like to schedule her surgery on 4/2/18. PLEASE  TAKE CARE OF THIS

## 2018-03-20 PROBLEM — M51.26 HERNIATED LUMBAR INTERVERTEBRAL DISC: Status: ACTIVE | Noted: 2018-03-20

## 2018-03-20 PROBLEM — M43.16 SPONDYLOLISTHESIS OF LUMBAR REGION: Status: ACTIVE | Noted: 2018-03-20

## 2018-03-22 DIAGNOSIS — M51.26 HERNIATED LUMBAR INTERVERTEBRAL DISC: ICD-10-CM

## 2018-03-22 DIAGNOSIS — M43.16 SPONDYLOLISTHESIS OF LUMBAR REGION: Primary | ICD-10-CM

## 2018-03-28 ENCOUNTER — APPOINTMENT (OUTPATIENT)
Dept: PREADMISSION TESTING | Facility: HOSPITAL | Age: 38
End: 2018-03-28

## 2018-03-28 ENCOUNTER — PREP FOR SURGERY (OUTPATIENT)
Dept: OTHER | Facility: HOSPITAL | Age: 38
End: 2018-03-28

## 2018-03-28 VITALS
BODY MASS INDEX: 37.22 KG/M2 | DIASTOLIC BLOOD PRESSURE: 93 MMHG | RESPIRATION RATE: 20 BRPM | WEIGHT: 218 LBS | TEMPERATURE: 98.4 F | HEIGHT: 64 IN | SYSTOLIC BLOOD PRESSURE: 126 MMHG | OXYGEN SATURATION: 98 %

## 2018-03-28 DIAGNOSIS — M51.26 RUPTURED LUMBAR INTERVERTEBRAL DISC: Primary | ICD-10-CM

## 2018-03-28 DIAGNOSIS — M43.16 SPONDYLOLISTHESIS OF LUMBAR REGION: ICD-10-CM

## 2018-03-28 LAB
ANION GAP SERPL CALCULATED.3IONS-SCNC: 13.4 MMOL/L
BUN BLD-MCNC: 5 MG/DL (ref 6–20)
BUN/CREAT SERPL: 7.8 (ref 7–25)
CALCIUM SPEC-SCNC: 9.3 MG/DL (ref 8.6–10.5)
CHLORIDE SERPL-SCNC: 100 MMOL/L (ref 98–107)
CO2 SERPL-SCNC: 25.6 MMOL/L (ref 22–29)
CREAT BLD-MCNC: 0.64 MG/DL (ref 0.57–1)
DEPRECATED RDW RBC AUTO: 44.6 FL (ref 37–54)
ERYTHROCYTE [DISTWIDTH] IN BLOOD BY AUTOMATED COUNT: 12.9 % (ref 11.7–13)
GFR SERPL CREATININE-BSD FRML MDRD: 103 ML/MIN/1.73
GLUCOSE BLD-MCNC: 88 MG/DL (ref 65–99)
HCT VFR BLD AUTO: 45.5 % (ref 35.6–45.5)
HGB BLD-MCNC: 15.1 G/DL (ref 11.9–15.5)
MCH RBC QN AUTO: 31.9 PG (ref 26.9–32)
MCHC RBC AUTO-ENTMCNC: 33.2 G/DL (ref 32.4–36.3)
MCV RBC AUTO: 96 FL (ref 80.5–98.2)
PLATELET # BLD AUTO: 369 10*3/MM3 (ref 140–500)
PMV BLD AUTO: 9.5 FL (ref 6–12)
POTASSIUM BLD-SCNC: 4.1 MMOL/L (ref 3.5–5.2)
RBC # BLD AUTO: 4.74 10*6/MM3 (ref 3.9–5.2)
SODIUM BLD-SCNC: 139 MMOL/L (ref 136–145)
WBC NRBC COR # BLD: 14.04 10*3/MM3 (ref 4.5–10.7)

## 2018-03-28 PROCEDURE — 93005 ELECTROCARDIOGRAM TRACING: CPT

## 2018-03-28 PROCEDURE — 93010 ELECTROCARDIOGRAM REPORT: CPT | Performed by: INTERNAL MEDICINE

## 2018-03-28 PROCEDURE — 80048 BASIC METABOLIC PNL TOTAL CA: CPT | Performed by: ORTHOPAEDIC SURGERY

## 2018-03-28 PROCEDURE — 36415 COLL VENOUS BLD VENIPUNCTURE: CPT

## 2018-03-28 PROCEDURE — 85027 COMPLETE CBC AUTOMATED: CPT | Performed by: ORTHOPAEDIC SURGERY

## 2018-03-28 RX ORDER — IBUPROFEN 600 MG/1
600 TABLET ORAL EVERY 6 HOURS PRN
Status: ON HOLD | COMMUNITY
End: 2018-04-02

## 2018-03-28 NOTE — DISCHARGE INSTRUCTIONS
Take the following medications the morning of surgery with a small sip of water:  OMEPRAZOLE  BRING INHALER    General Instructions:  • Do not eat solid food after midnight the night before surgery.  • You may drink clear liquids day of surgery but must stop at least one hour before your hospital arrival time.  • It is beneficial for you to have a clear drink that contains carbohydrates the day of surgery.  We suggest a 12 to 20 ounce bottle of Gatorade or Powerade for non-diabetic patients or a 12 to 20 ounce bottle of G2 or Powerade Zero for diabetic patients. (Pediatric patients, are not advised to drink a 12 to 20 ounce carbohydrate drink)    Clear liquids are liquids you can see through.  Nothing red in color.     Plain water                               Sports drinks  Sodas                                   Gelatin (Jell-O)  Fruit juices without pulp such as white grape juice and apple juice  Popsicles that contain no fruit or yogurt  Tea or coffee (no cream or milk added)  Gatorade / Powerade  G2 / Powerade Zero    • Infants may have breast milk up to four hours before surgery.  • Infants drinking formula may drink formula up to six hours before surgery.   • Patients who avoid smoking, chewing tobacco and alcohol for 4 weeks prior to surgery have a reduced risk of post-operative complications.  Quit smoking as many days before surgery as you can.  • Do not smoke, use chewing tobacco or drink alcohol the day of surgery.   • If applicable bring your C-PAP/ BI-PAP machine.  • Bring any papers given to you in the doctor’s office.  • Wear clean comfortable clothes and socks.  • Do not wear contact lenses or make-up.  Bring a case for your glasses.   • Bring crutches or walker if applicable.  • Remove all piercings.  Leave jewelry and any other valuables at home.  • Hair extensions with metal clips must be removed prior to surgery.  • The Pre-Admission Testing nurse will instruct you to bring medications if unable  to obtain an accurate list in Pre-Admission Testing.        If you were given a blood bank ID arm band remember to bring it with you the day of surgery.    Preventing a Surgical Site Infection:  • For 2 to 3 days before surgery, avoid shaving with a razor because the razor can irritate skin and make it easier to develop an infection.  • The night prior to surgery sleep in a clean bed with clean clothing.  Do not allow pets to sleep with you.  • Shower on the morning of surgery using a fresh bar of anti-bacterial soap (such as Dial) and clean washcloth.  Dry with a clean towel and dress in clean clothing.  • Ask your surgeon if you will be receiving antibiotics prior to surgery.  • Make sure you, your family, and all healthcare providers clean their hands with soap and water or an alcohol based hand  before caring for you or your wound.    Day of surgery:4/2/2018 ARRIVAL TIME  5:30 AM  Upon arrival, a Pre-op nurse and Anesthesiologist will review your health history, obtain vital signs, and answer questions you may have.  The only belongings needed at this time will be your home medications and if applicable your C-PAP/BI-PAP machine.  If you are staying overnight your family can leave the rest of your belongings in the car and bring them to your room later.  A Pre-op nurse will start an IV and you may receive medication in preparation for surgery, including something to help you relax.  Your family will be able to see you in the Pre-op area.  While you are in surgery your family should notify the waiting room  if they leave the waiting room area and provide a contact phone number.    Please be aware that surgery does come with discomfort.  We want to make every effort to control your discomfort so please discuss any uncontrolled symptoms with your nurse.   Your doctor will most likely have prescribed pain medications.      If you are going home after surgery you will receive individualized  written care instructions before being discharged.  A responsible adult must drive you to and from the hospital on the day of your surgery and stay with you for 24 hours.    If you are staying overnight following surgery, you will be transported to your hospital room following the recovery period.  [facility] has all private rooms.    If you have any questions please call Pre-Admission Testing at [phone].  Deductibles and co-payments are collected on the day of service. Please be prepared to pay the required co-pay, deductible or deposit on the day of service as defined by your plan.

## 2018-04-02 ENCOUNTER — ANESTHESIA EVENT (OUTPATIENT)
Dept: PERIOP | Facility: HOSPITAL | Age: 38
End: 2018-04-02

## 2018-04-02 ENCOUNTER — APPOINTMENT (OUTPATIENT)
Dept: GENERAL RADIOLOGY | Facility: HOSPITAL | Age: 38
End: 2018-04-02

## 2018-04-02 ENCOUNTER — HOSPITAL ENCOUNTER (INPATIENT)
Facility: HOSPITAL | Age: 38
LOS: 2 days | Discharge: HOME | End: 2018-04-04
Attending: ORTHOPAEDIC SURGERY | Admitting: ORTHOPAEDIC SURGERY

## 2018-04-02 ENCOUNTER — ANESTHESIA (OUTPATIENT)
Dept: PERIOP | Facility: HOSPITAL | Age: 38
End: 2018-04-02

## 2018-04-02 DIAGNOSIS — M51.26 RUPTURED LUMBAR INTERVERTEBRAL DISC: ICD-10-CM

## 2018-04-02 DIAGNOSIS — M48.062 SPINAL STENOSIS OF LUMBAR REGION WITH NEUROGENIC CLAUDICATION: ICD-10-CM

## 2018-04-02 DIAGNOSIS — M43.16 SPONDYLOLISTHESIS OF LUMBAR REGION: ICD-10-CM

## 2018-04-02 DIAGNOSIS — R26.2 DIFFICULTY WALKING: Primary | ICD-10-CM

## 2018-04-02 DIAGNOSIS — M51.26 HERNIATED LUMBAR INTERVERTEBRAL DISC: ICD-10-CM

## 2018-04-02 PROBLEM — M48.061 SPINAL STENOSIS OF LUMBAR REGION: Status: ACTIVE | Noted: 2018-04-02

## 2018-04-02 LAB
BASOPHILS # BLD AUTO: 0.05 10*3/MM3 (ref 0–0.2)
BASOPHILS NFR BLD AUTO: 0.3 % (ref 0–1.5)
CRP SERPL-MCNC: 1.21 MG/DL (ref 0–0.5)
DEPRECATED RDW RBC AUTO: 44 FL (ref 37–54)
EOSINOPHIL # BLD AUTO: 0.14 10*3/MM3 (ref 0–0.7)
EOSINOPHIL NFR BLD AUTO: 0.8 % (ref 0.3–6.2)
ERYTHROCYTE [DISTWIDTH] IN BLOOD BY AUTOMATED COUNT: 12.7 % (ref 11.7–13)
ERYTHROCYTE [SEDIMENTATION RATE] IN BLOOD: 18 MM/HR (ref 0–20)
HCT VFR BLD AUTO: 42.1 % (ref 35.6–45.5)
HCT VFR BLD AUTO: 43.3 % (ref 35.6–45.5)
HGB BLD-MCNC: 13.3 G/DL (ref 11.9–15.5)
HGB BLD-MCNC: 14.4 G/DL (ref 11.9–15.5)
IMM GRANULOCYTES # BLD: 0.04 10*3/MM3 (ref 0–0.03)
IMM GRANULOCYTES NFR BLD: 0.2 % (ref 0–0.5)
LYMPHOCYTES # BLD AUTO: 3.28 10*3/MM3 (ref 0.9–4.8)
LYMPHOCYTES NFR BLD AUTO: 18.7 % (ref 19.6–45.3)
MCH RBC QN AUTO: 31.8 PG (ref 26.9–32)
MCHC RBC AUTO-ENTMCNC: 33.3 G/DL (ref 32.4–36.3)
MCV RBC AUTO: 95.6 FL (ref 80.5–98.2)
MONOCYTES # BLD AUTO: 0.8 10*3/MM3 (ref 0.2–1.2)
MONOCYTES NFR BLD AUTO: 4.6 % (ref 5–12)
NEUTROPHILS # BLD AUTO: 13.23 10*3/MM3 (ref 1.9–8.1)
NEUTROPHILS NFR BLD AUTO: 75.4 % (ref 42.7–76)
PLATELET # BLD AUTO: 365 10*3/MM3 (ref 140–500)
PMV BLD AUTO: 9.6 FL (ref 6–12)
RBC # BLD AUTO: 4.53 10*6/MM3 (ref 3.9–5.2)
WBC NRBC COR # BLD: 17.54 10*3/MM3 (ref 4.5–10.7)

## 2018-04-02 PROCEDURE — 07DR3ZZ EXTRACTION OF ILIAC BONE MARROW, PERCUTANEOUS APPROACH: ICD-10-PCS | Performed by: ORTHOPAEDIC SURGERY

## 2018-04-02 PROCEDURE — 81270 JAK2 GENE: CPT | Performed by: INTERNAL MEDICINE

## 2018-04-02 PROCEDURE — 0ST40ZZ RESECTION OF LUMBOSACRAL DISC, OPEN APPROACH: ICD-10-PCS | Performed by: ORTHOPAEDIC SURGERY

## 2018-04-02 PROCEDURE — 25010000002 PROPOFOL 10 MG/ML EMULSION: Performed by: NURSE ANESTHETIST, CERTIFIED REGISTERED

## 2018-04-02 PROCEDURE — 25010000002 HEPARIN (PORCINE) PER 1000 UNITS: Performed by: ORTHOPAEDIC SURGERY

## 2018-04-02 PROCEDURE — 22840 INSERT SPINE FIXATION DEVICE: CPT | Performed by: ORTHOPAEDIC SURGERY

## 2018-04-02 PROCEDURE — S0260 H&P FOR SURGERY: HCPCS | Performed by: ORTHOPAEDIC SURGERY

## 2018-04-02 PROCEDURE — 25010000002 VANCOMYCIN 10 G RECONSTITUTED SOLUTION: Performed by: ORTHOPAEDIC SURGERY

## 2018-04-02 PROCEDURE — 63030 LAMOT DCMPRN NRV RT 1 LMBR: CPT | Performed by: ORTHOPAEDIC SURGERY

## 2018-04-02 PROCEDURE — 25810000003 POTASSIUM CHLORIDE PER 2 MEQ: Performed by: ORTHOPAEDIC SURGERY

## 2018-04-02 PROCEDURE — 25010000002 NEOSTIGMINE PER 0.5 MG: Performed by: NURSE ANESTHETIST, CERTIFIED REGISTERED

## 2018-04-02 PROCEDURE — 25010000002 HYDROMORPHONE PER 4 MG: Performed by: NURSE ANESTHETIST, CERTIFIED REGISTERED

## 2018-04-02 PROCEDURE — C1713 ANCHOR/SCREW BN/BN,TIS/BN: HCPCS | Performed by: ORTHOPAEDIC SURGERY

## 2018-04-02 PROCEDURE — 85018 HEMOGLOBIN: CPT | Performed by: ORTHOPAEDIC SURGERY

## 2018-04-02 PROCEDURE — 25010000002 FENTANYL CITRATE (PF) 100 MCG/2ML SOLUTION: Performed by: NURSE ANESTHETIST, CERTIFIED REGISTERED

## 2018-04-02 PROCEDURE — 20939 BONE MARROW ASPIR BONE GRFG: CPT | Performed by: ORTHOPAEDIC SURGERY

## 2018-04-02 PROCEDURE — 25010000002 MIDAZOLAM PER 1 MG: Performed by: NURSE ANESTHETIST, CERTIFIED REGISTERED

## 2018-04-02 PROCEDURE — 25010000002 DEXAMETHASONE PER 1 MG: Performed by: NURSE ANESTHETIST, CERTIFIED REGISTERED

## 2018-04-02 PROCEDURE — 22612 ARTHRD PST TQ 1NTRSPC LUMBAR: CPT | Performed by: ORTHOPAEDIC SURGERY

## 2018-04-02 PROCEDURE — 99255 IP/OBS CONSLTJ NEW/EST HI 80: CPT | Performed by: INTERNAL MEDICINE

## 2018-04-02 PROCEDURE — 0SG3071 FUSION OF LUMBOSACRAL JOINT WITH AUTOLOGOUS TISSUE SUBSTITUTE, POSTERIOR APPROACH, POSTERIOR COLUMN, OPEN APPROACH: ICD-10-PCS | Performed by: ORTHOPAEDIC SURGERY

## 2018-04-02 PROCEDURE — 72100 X-RAY EXAM L-S SPINE 2/3 VWS: CPT

## 2018-04-02 PROCEDURE — 85025 COMPLETE CBC W/AUTO DIFF WBC: CPT | Performed by: ORTHOPAEDIC SURGERY

## 2018-04-02 PROCEDURE — 86140 C-REACTIVE PROTEIN: CPT | Performed by: INTERNAL MEDICINE

## 2018-04-02 PROCEDURE — 81206 BCR/ABL1 GENE MAJOR BP: CPT | Performed by: INTERNAL MEDICINE

## 2018-04-02 PROCEDURE — 85014 HEMATOCRIT: CPT | Performed by: ORTHOPAEDIC SURGERY

## 2018-04-02 PROCEDURE — 25010000002 ONDANSETRON PER 1 MG: Performed by: NURSE ANESTHETIST, CERTIFIED REGISTERED

## 2018-04-02 PROCEDURE — 25010000002 VANCOMYCIN PER 500 MG: Performed by: ORTHOPAEDIC SURGERY

## 2018-04-02 PROCEDURE — 76000 FLUOROSCOPY <1 HR PHYS/QHP: CPT

## 2018-04-02 PROCEDURE — 25010000002 MIDAZOLAM PER 1 MG: Performed by: ANESTHESIOLOGY

## 2018-04-02 PROCEDURE — 85652 RBC SED RATE AUTOMATED: CPT | Performed by: INTERNAL MEDICINE

## 2018-04-02 DEVICE — ROD PREBNT SPINE EXPEDIUM TI 5.5X40MM: Type: IMPLANTABLE DEVICE | Site: SPINE LUMBAR | Status: FUNCTIONAL

## 2018-04-02 DEVICE — SCRW EXPEDIUM PA TI 6X45MM: Type: IMPLANTABLE DEVICE | Site: SPINE LUMBAR | Status: FUNCTIONAL

## 2018-04-02 DEVICE — SCRW EXPEDIUM PA TI 6X35MM: Type: IMPLANTABLE DEVICE | Site: SPINE LUMBAR | Status: FUNCTIONAL

## 2018-04-02 DEVICE — SCRW INNR EXPEDIUM: Type: IMPLANTABLE DEVICE | Site: SPINE LUMBAR | Status: FUNCTIONAL

## 2018-04-02 DEVICE — SCRW EXPEDIUM PA TI 6X40MM: Type: IMPLANTABLE DEVICE | Site: SPINE LUMBAR | Status: FUNCTIONAL

## 2018-04-02 DEVICE — STRIP 7800310 MASTERGRAFT STRIP 10CM
Type: IMPLANTABLE DEVICE | Site: SPINE LUMBAR | Status: FUNCTIONAL
Brand: MASTERGRAFT® STRIP

## 2018-04-02 RX ORDER — MIDAZOLAM HYDROCHLORIDE 1 MG/ML
1 INJECTION INTRAMUSCULAR; INTRAVENOUS
Status: DISCONTINUED | OUTPATIENT
Start: 2018-04-02 | End: 2018-04-02 | Stop reason: HOSPADM

## 2018-04-02 RX ORDER — GLYCOPYRROLATE 0.2 MG/ML
INJECTION INTRAMUSCULAR; INTRAVENOUS AS NEEDED
Status: DISCONTINUED | OUTPATIENT
Start: 2018-04-02 | End: 2018-04-02 | Stop reason: SURG

## 2018-04-02 RX ORDER — PROPOFOL 10 MG/ML
VIAL (ML) INTRAVENOUS AS NEEDED
Status: DISCONTINUED | OUTPATIENT
Start: 2018-04-02 | End: 2018-04-02 | Stop reason: SURG

## 2018-04-02 RX ORDER — SODIUM CHLORIDE 0.9 % (FLUSH) 0.9 %
1-10 SYRINGE (ML) INJECTION AS NEEDED
Status: DISCONTINUED | OUTPATIENT
Start: 2018-04-02 | End: 2018-04-04 | Stop reason: HOSPADM

## 2018-04-02 RX ORDER — OXYCODONE HYDROCHLORIDE AND ACETAMINOPHEN 5; 325 MG/1; MG/1
TABLET ORAL
Status: COMPLETED
Start: 2018-04-02 | End: 2018-04-02

## 2018-04-02 RX ORDER — LIDOCAINE HYDROCHLORIDE 20 MG/ML
INJECTION, SOLUTION INFILTRATION; PERINEURAL AS NEEDED
Status: DISCONTINUED | OUTPATIENT
Start: 2018-04-02 | End: 2018-04-02 | Stop reason: SURG

## 2018-04-02 RX ORDER — EPHEDRINE SULFATE 50 MG/ML
5 INJECTION, SOLUTION INTRAVENOUS ONCE AS NEEDED
Status: DISCONTINUED | OUTPATIENT
Start: 2018-04-02 | End: 2018-04-02 | Stop reason: HOSPADM

## 2018-04-02 RX ORDER — NALOXONE HCL 0.4 MG/ML
0.2 VIAL (ML) INJECTION AS NEEDED
Status: DISCONTINUED | OUTPATIENT
Start: 2018-04-02 | End: 2018-04-02 | Stop reason: HOSPADM

## 2018-04-02 RX ORDER — NALOXONE HCL 0.4 MG/ML
0.1 VIAL (ML) INJECTION
Status: DISCONTINUED | OUTPATIENT
Start: 2018-04-02 | End: 2018-04-04 | Stop reason: HOSPADM

## 2018-04-02 RX ORDER — FAMOTIDINE 10 MG/ML
20 INJECTION, SOLUTION INTRAVENOUS ONCE
Status: COMPLETED | OUTPATIENT
Start: 2018-04-02 | End: 2018-04-02

## 2018-04-02 RX ORDER — LORAZEPAM 0.5 MG/1
0.5 TABLET ORAL EVERY 8 HOURS PRN
Status: DISCONTINUED | OUTPATIENT
Start: 2018-04-02 | End: 2018-04-02

## 2018-04-02 RX ORDER — CETIRIZINE HYDROCHLORIDE 10 MG/1
10 TABLET ORAL DAILY
Status: DISCONTINUED | OUTPATIENT
Start: 2018-04-02 | End: 2018-04-04 | Stop reason: HOSPADM

## 2018-04-02 RX ORDER — HYDROMORPHONE HCL 110MG/55ML
PATIENT CONTROLLED ANALGESIA SYRINGE INTRAVENOUS AS NEEDED
Status: DISCONTINUED | OUTPATIENT
Start: 2018-04-02 | End: 2018-04-02 | Stop reason: SURG

## 2018-04-02 RX ORDER — LORAZEPAM 0.5 MG/1
0.5 TABLET ORAL EVERY 8 HOURS PRN
Status: DISCONTINUED | OUTPATIENT
Start: 2018-04-02 | End: 2018-04-04 | Stop reason: HOSPADM

## 2018-04-02 RX ORDER — FENTANYL CITRATE 50 UG/ML
50 INJECTION, SOLUTION INTRAMUSCULAR; INTRAVENOUS
Status: DISCONTINUED | OUTPATIENT
Start: 2018-04-02 | End: 2018-04-02 | Stop reason: HOSPADM

## 2018-04-02 RX ORDER — MIDAZOLAM HYDROCHLORIDE 1 MG/ML
INJECTION INTRAMUSCULAR; INTRAVENOUS AS NEEDED
Status: DISCONTINUED | OUTPATIENT
Start: 2018-04-02 | End: 2018-04-02 | Stop reason: SURG

## 2018-04-02 RX ORDER — ROCURONIUM BROMIDE 10 MG/ML
INJECTION, SOLUTION INTRAVENOUS AS NEEDED
Status: DISCONTINUED | OUTPATIENT
Start: 2018-04-02 | End: 2018-04-02 | Stop reason: SURG

## 2018-04-02 RX ORDER — CYCLOBENZAPRINE HCL 10 MG
10 TABLET ORAL 3 TIMES DAILY PRN
Status: DISCONTINUED | OUTPATIENT
Start: 2018-04-02 | End: 2018-04-04 | Stop reason: HOSPADM

## 2018-04-02 RX ORDER — MIDAZOLAM HYDROCHLORIDE 1 MG/ML
2 INJECTION INTRAMUSCULAR; INTRAVENOUS
Status: DISCONTINUED | OUTPATIENT
Start: 2018-04-02 | End: 2018-04-02 | Stop reason: HOSPADM

## 2018-04-02 RX ORDER — HYDROMORPHONE HCL IN 0.9% NACL 10 MG/50ML
PATIENT CONTROLLED ANALGESIA SYRINGE INTRAVENOUS CONTINUOUS
Status: DISCONTINUED | OUTPATIENT
Start: 2018-04-02 | End: 2018-04-04 | Stop reason: HOSPADM

## 2018-04-02 RX ORDER — DIPHENHYDRAMINE HYDROCHLORIDE 50 MG/ML
12.5 INJECTION INTRAMUSCULAR; INTRAVENOUS
Status: DISCONTINUED | OUTPATIENT
Start: 2018-04-02 | End: 2018-04-02 | Stop reason: HOSPADM

## 2018-04-02 RX ORDER — SODIUM CHLORIDE, SODIUM LACTATE, POTASSIUM CHLORIDE, CALCIUM CHLORIDE 600; 310; 30; 20 MG/100ML; MG/100ML; MG/100ML; MG/100ML
100 INJECTION, SOLUTION INTRAVENOUS CONTINUOUS
Status: DISCONTINUED | OUTPATIENT
Start: 2018-04-02 | End: 2018-04-04 | Stop reason: HOSPADM

## 2018-04-02 RX ORDER — MELATONIN
2000 DAILY
Status: DISCONTINUED | OUTPATIENT
Start: 2018-04-02 | End: 2018-04-04 | Stop reason: HOSPADM

## 2018-04-02 RX ORDER — PANTOPRAZOLE SODIUM 40 MG/1
40 TABLET, DELAYED RELEASE ORAL EVERY MORNING
Status: DISCONTINUED | OUTPATIENT
Start: 2018-04-03 | End: 2018-04-04 | Stop reason: HOSPADM

## 2018-04-02 RX ORDER — ONDANSETRON 2 MG/ML
4 INJECTION INTRAMUSCULAR; INTRAVENOUS EVERY 6 HOURS PRN
Status: DISCONTINUED | OUTPATIENT
Start: 2018-04-02 | End: 2018-04-04 | Stop reason: HOSPADM

## 2018-04-02 RX ORDER — PROMETHAZINE HYDROCHLORIDE 25 MG/ML
12.5 INJECTION, SOLUTION INTRAMUSCULAR; INTRAVENOUS ONCE AS NEEDED
Status: DISCONTINUED | OUTPATIENT
Start: 2018-04-02 | End: 2018-04-02 | Stop reason: HOSPADM

## 2018-04-02 RX ORDER — PROMETHAZINE HYDROCHLORIDE 25 MG/1
25 TABLET ORAL ONCE AS NEEDED
Status: DISCONTINUED | OUTPATIENT
Start: 2018-04-02 | End: 2018-04-02 | Stop reason: HOSPADM

## 2018-04-02 RX ORDER — PROMETHAZINE HYDROCHLORIDE 25 MG/1
25 SUPPOSITORY RECTAL ONCE AS NEEDED
Status: DISCONTINUED | OUTPATIENT
Start: 2018-04-02 | End: 2018-04-02 | Stop reason: HOSPADM

## 2018-04-02 RX ORDER — ONDANSETRON 4 MG/1
4 TABLET, FILM COATED ORAL EVERY 6 HOURS PRN
Status: DISCONTINUED | OUTPATIENT
Start: 2018-04-02 | End: 2018-04-04 | Stop reason: HOSPADM

## 2018-04-02 RX ORDER — TEMAZEPAM 15 MG/1
15 CAPSULE ORAL NIGHTLY PRN
Status: DISCONTINUED | OUTPATIENT
Start: 2018-04-02 | End: 2018-04-04 | Stop reason: HOSPADM

## 2018-04-02 RX ORDER — FENTANYL CITRATE 50 UG/ML
INJECTION, SOLUTION INTRAMUSCULAR; INTRAVENOUS AS NEEDED
Status: DISCONTINUED | OUTPATIENT
Start: 2018-04-02 | End: 2018-04-02 | Stop reason: SURG

## 2018-04-02 RX ORDER — HYDROMORPHONE HCL 110MG/55ML
0.5 PATIENT CONTROLLED ANALGESIA SYRINGE INTRAVENOUS
Status: DISCONTINUED | OUTPATIENT
Start: 2018-04-02 | End: 2018-04-02 | Stop reason: HOSPADM

## 2018-04-02 RX ORDER — LORAZEPAM 1 MG/1
1 TABLET ORAL EVERY 8 HOURS PRN
Status: DISCONTINUED | OUTPATIENT
Start: 2018-04-02 | End: 2018-04-04 | Stop reason: HOSPADM

## 2018-04-02 RX ORDER — ALBUTEROL SULFATE 90 UG/1
1 AEROSOL, METERED RESPIRATORY (INHALATION) EVERY 4 HOURS PRN
Status: DISCONTINUED | OUTPATIENT
Start: 2018-04-02 | End: 2018-04-04 | Stop reason: HOSPADM

## 2018-04-02 RX ORDER — LABETALOL HYDROCHLORIDE 5 MG/ML
5 INJECTION, SOLUTION INTRAVENOUS
Status: DISCONTINUED | OUTPATIENT
Start: 2018-04-02 | End: 2018-04-02 | Stop reason: HOSPADM

## 2018-04-02 RX ORDER — SENNA AND DOCUSATE SODIUM 50; 8.6 MG/1; MG/1
1 TABLET, FILM COATED ORAL NIGHTLY
Status: DISCONTINUED | OUTPATIENT
Start: 2018-04-02 | End: 2018-04-03

## 2018-04-02 RX ORDER — DEXAMETHASONE SODIUM PHOSPHATE 10 MG/ML
INJECTION INTRAMUSCULAR; INTRAVENOUS AS NEEDED
Status: DISCONTINUED | OUTPATIENT
Start: 2018-04-02 | End: 2018-04-02 | Stop reason: SURG

## 2018-04-02 RX ORDER — BUSPIRONE HYDROCHLORIDE 15 MG/1
15 TABLET ORAL 3 TIMES DAILY
Status: DISCONTINUED | OUTPATIENT
Start: 2018-04-02 | End: 2018-04-04 | Stop reason: HOSPADM

## 2018-04-02 RX ORDER — BISACODYL 10 MG
10 SUPPOSITORY, RECTAL RECTAL DAILY PRN
Status: DISCONTINUED | OUTPATIENT
Start: 2018-04-02 | End: 2018-04-04 | Stop reason: HOSPADM

## 2018-04-02 RX ORDER — OXYCODONE HYDROCHLORIDE AND ACETAMINOPHEN 5; 325 MG/1; MG/1
2 TABLET ORAL EVERY 4 HOURS PRN
Status: DISCONTINUED | OUTPATIENT
Start: 2018-04-02 | End: 2018-04-04 | Stop reason: HOSPADM

## 2018-04-02 RX ORDER — ONDANSETRON 4 MG/1
4 TABLET, ORALLY DISINTEGRATING ORAL EVERY 6 HOURS PRN
Status: DISCONTINUED | OUTPATIENT
Start: 2018-04-02 | End: 2018-04-04 | Stop reason: HOSPADM

## 2018-04-02 RX ORDER — LIDOCAINE HYDROCHLORIDE 10 MG/ML
0.5 INJECTION, SOLUTION EPIDURAL; INFILTRATION; INTRACAUDAL; PERINEURAL ONCE AS NEEDED
Status: DISCONTINUED | OUTPATIENT
Start: 2018-04-02 | End: 2018-04-02 | Stop reason: HOSPADM

## 2018-04-02 RX ORDER — SODIUM CHLORIDE AND POTASSIUM CHLORIDE 150; 450 MG/100ML; MG/100ML
100 INJECTION, SOLUTION INTRAVENOUS CONTINUOUS
Status: DISCONTINUED | OUTPATIENT
Start: 2018-04-02 | End: 2018-04-04 | Stop reason: HOSPADM

## 2018-04-02 RX ORDER — EPHEDRINE SULFATE 50 MG/ML
INJECTION, SOLUTION INTRAVENOUS AS NEEDED
Status: DISCONTINUED | OUTPATIENT
Start: 2018-04-02 | End: 2018-04-02 | Stop reason: SURG

## 2018-04-02 RX ORDER — SODIUM CHLORIDE 0.9 % (FLUSH) 0.9 %
1-10 SYRINGE (ML) INJECTION AS NEEDED
Status: DISCONTINUED | OUTPATIENT
Start: 2018-04-02 | End: 2018-04-02 | Stop reason: HOSPADM

## 2018-04-02 RX ORDER — ONDANSETRON 2 MG/ML
4 INJECTION INTRAMUSCULAR; INTRAVENOUS ONCE AS NEEDED
Status: DISCONTINUED | OUTPATIENT
Start: 2018-04-02 | End: 2018-04-02 | Stop reason: HOSPADM

## 2018-04-02 RX ORDER — FLUMAZENIL 0.1 MG/ML
0.2 INJECTION INTRAVENOUS AS NEEDED
Status: DISCONTINUED | OUTPATIENT
Start: 2018-04-02 | End: 2018-04-02 | Stop reason: HOSPADM

## 2018-04-02 RX ORDER — SODIUM CHLORIDE, SODIUM LACTATE, POTASSIUM CHLORIDE, CALCIUM CHLORIDE 600; 310; 30; 20 MG/100ML; MG/100ML; MG/100ML; MG/100ML
9 INJECTION, SOLUTION INTRAVENOUS CONTINUOUS
Status: DISCONTINUED | OUTPATIENT
Start: 2018-04-02 | End: 2018-04-04 | Stop reason: HOSPADM

## 2018-04-02 RX ORDER — ONDANSETRON 2 MG/ML
INJECTION INTRAMUSCULAR; INTRAVENOUS AS NEEDED
Status: DISCONTINUED | OUTPATIENT
Start: 2018-04-02 | End: 2018-04-02 | Stop reason: SURG

## 2018-04-02 RX ADMIN — BUSPIRONE HYDROCHLORIDE 15 MG: 15 TABLET ORAL at 23:01

## 2018-04-02 RX ADMIN — POTASSIUM CHLORIDE AND SODIUM CHLORIDE 100 ML/HR: 450; 150 INJECTION, SOLUTION INTRAVENOUS at 14:24

## 2018-04-02 RX ADMIN — HYDROMORPHONE HYDROCHLORIDE 0.5 MG: 2 INJECTION INTRAMUSCULAR; INTRAVENOUS; SUBCUTANEOUS at 11:10

## 2018-04-02 RX ADMIN — SODIUM CHLORIDE, POTASSIUM CHLORIDE, SODIUM LACTATE AND CALCIUM CHLORIDE 9 ML/HR: 600; 310; 30; 20 INJECTION, SOLUTION INTRAVENOUS at 10:48

## 2018-04-02 RX ADMIN — HYDROMORPHONE HYDROCHLORIDE 0.5 MG: 2 INJECTION INTRAMUSCULAR; INTRAVENOUS; SUBCUTANEOUS at 10:41

## 2018-04-02 RX ADMIN — LIDOCAINE HYDROCHLORIDE 60 MG: 20 INJECTION, SOLUTION INFILTRATION; PERINEURAL at 07:43

## 2018-04-02 RX ADMIN — OXYCODONE HYDROCHLORIDE AND ACETAMINOPHEN 2 TABLET: 5; 325 TABLET ORAL at 10:56

## 2018-04-02 RX ADMIN — LORAZEPAM 0.5 MG: 0.5 TABLET ORAL at 14:45

## 2018-04-02 RX ADMIN — FENTANYL CITRATE 50 MCG: 50 INJECTION, SOLUTION INTRAMUSCULAR; INTRAVENOUS at 10:20

## 2018-04-02 RX ADMIN — HYDROMORPHONE HYDROCHLORIDE 1 MG: 2 INJECTION INTRAMUSCULAR; INTRAVENOUS; SUBCUTANEOUS at 10:19

## 2018-04-02 RX ADMIN — FENTANYL CITRATE 50 MCG: 50 INJECTION, SOLUTION INTRAMUSCULAR; INTRAVENOUS at 08:20

## 2018-04-02 RX ADMIN — ROCURONIUM BROMIDE 50 MG: 10 INJECTION INTRAVENOUS at 07:43

## 2018-04-02 RX ADMIN — FAMOTIDINE 20 MG: 10 INJECTION INTRAVENOUS at 07:19

## 2018-04-02 RX ADMIN — HYDROMORPHONE HYDROCHLORIDE 0.5 MG: 2 INJECTION INTRAMUSCULAR; INTRAVENOUS; SUBCUTANEOUS at 11:35

## 2018-04-02 RX ADMIN — DEXAMETHASONE SODIUM PHOSPHATE 10 MG: 10 INJECTION INTRAMUSCULAR; INTRAVENOUS at 09:00

## 2018-04-02 RX ADMIN — ROCURONIUM BROMIDE 20 MG: 10 INJECTION INTRAVENOUS at 08:36

## 2018-04-02 RX ADMIN — Medication 2 MG: at 07:40

## 2018-04-02 RX ADMIN — CYCLOBENZAPRINE 10 MG: 10 TABLET, FILM COATED ORAL at 10:56

## 2018-04-02 RX ADMIN — Medication: at 10:43

## 2018-04-02 RX ADMIN — VANCOMYCIN HYDROCHLORIDE 1500 MG: 10 INJECTION, POWDER, LYOPHILIZED, FOR SOLUTION INTRAVENOUS at 06:46

## 2018-04-02 RX ADMIN — PROPOFOL 150 MG: 10 INJECTION, EMULSION INTRAVENOUS at 07:43

## 2018-04-02 RX ADMIN — FENTANYL CITRATE 50 MCG: 50 INJECTION, SOLUTION INTRAMUSCULAR; INTRAVENOUS at 08:24

## 2018-04-02 RX ADMIN — FENTANYL CITRATE 50 MCG: 50 INJECTION INTRAMUSCULAR; INTRAVENOUS at 11:12

## 2018-04-02 RX ADMIN — NEOSTIGMINE METHYLSULFATE 4 MG: 1 INJECTION INTRAMUSCULAR; INTRAVENOUS; SUBCUTANEOUS at 10:11

## 2018-04-02 RX ADMIN — DOCUSATE SODIUM -SENNOSIDES 1 TABLET: 50; 8.6 TABLET, COATED ORAL at 23:01

## 2018-04-02 RX ADMIN — MIDAZOLAM 1 MG: 1 INJECTION INTRAMUSCULAR; INTRAVENOUS at 07:19

## 2018-04-02 RX ADMIN — SODIUM CHLORIDE, POTASSIUM CHLORIDE, SODIUM LACTATE AND CALCIUM CHLORIDE 100 ML/HR: 600; 310; 30; 20 INJECTION, SOLUTION INTRAVENOUS at 06:42

## 2018-04-02 RX ADMIN — VANCOMYCIN HYDROCHLORIDE 1500 MG: 10 INJECTION, POWDER, LYOPHILIZED, FOR SOLUTION INTRAVENOUS at 14:24

## 2018-04-02 RX ADMIN — EPHEDRINE SULFATE 10 MG: 50 INJECTION INTRAMUSCULAR; INTRAVENOUS; SUBCUTANEOUS at 08:45

## 2018-04-02 RX ADMIN — EPHEDRINE SULFATE 10 MG: 50 INJECTION INTRAMUSCULAR; INTRAVENOUS; SUBCUTANEOUS at 09:09

## 2018-04-02 RX ADMIN — FENTANYL CITRATE 50 MCG: 50 INJECTION INTRAMUSCULAR; INTRAVENOUS at 10:39

## 2018-04-02 RX ADMIN — ROCURONIUM BROMIDE 10 MG: 10 INJECTION INTRAVENOUS at 09:28

## 2018-04-02 RX ADMIN — FENTANYL CITRATE 50 MCG: 50 INJECTION INTRAMUSCULAR; INTRAVENOUS at 10:57

## 2018-04-02 RX ADMIN — GLYCOPYRROLATE 0.5 MG: 0.2 INJECTION INTRAMUSCULAR; INTRAVENOUS at 10:11

## 2018-04-02 RX ADMIN — FENTANYL CITRATE 100 MCG: 50 INJECTION, SOLUTION INTRAMUSCULAR; INTRAVENOUS at 07:43

## 2018-04-02 RX ADMIN — HYDROMORPHONE HYDROCHLORIDE 1 MG: 2 INJECTION INTRAMUSCULAR; INTRAVENOUS; SUBCUTANEOUS at 10:21

## 2018-04-02 RX ADMIN — SODIUM CHLORIDE, POTASSIUM CHLORIDE, SODIUM LACTATE AND CALCIUM CHLORIDE: 600; 310; 30; 20 INJECTION, SOLUTION INTRAVENOUS at 09:10

## 2018-04-02 RX ADMIN — OXYCODONE HYDROCHLORIDE AND ACETAMINOPHEN 2 TABLET: 5; 325 TABLET ORAL at 14:45

## 2018-04-02 RX ADMIN — ONDANSETRON 4 MG: 2 INJECTION INTRAMUSCULAR; INTRAVENOUS at 09:45

## 2018-04-02 RX ADMIN — HYDROMORPHONE HYDROCHLORIDE 0.5 MG: 2 INJECTION INTRAMUSCULAR; INTRAVENOUS; SUBCUTANEOUS at 10:56

## 2018-04-02 RX ADMIN — FENTANYL CITRATE 50 MCG: 50 INJECTION INTRAMUSCULAR; INTRAVENOUS at 11:34

## 2018-04-02 NOTE — PERIOPERATIVE NURSING NOTE
CALLED DR RAY TO LET HIM KNOW PT'S ELEVATED WBC COUNT THIS AM IS 17.54 4/2/2018, REMINDED HIM WBC WAS 14.04 ON 3/28/18. RELAYED THAT PT DENIES ANY SYMPTOMS OF ILLNESS OR UTI, DENIES HAVING ANY OPEN CUTS,SORES,ETC. DR RAY STATES OK, NOTES IN PT'S CHART SHE HAS A HISTORY OF ELEVATED WBC. NO NEW ORDERS NOTED, OK TO PROCEED TO OR TODAY.

## 2018-04-02 NOTE — ANESTHESIA PROCEDURE NOTES
Airway  Urgency: elective    Airway not difficult    General Information and Staff    Patient location during procedure: OR  CRNA: DEBRA PENG    Indications and Patient Condition  Indications for airway management: airway protection    Preoxygenated: yes  Mask difficulty assessment: 1 - vent by mask    Final Airway Details  Final airway type: endotracheal airway      Successful airway: ETT  Cuffed: yes   Successful intubation technique: direct laryngoscopy  Endotracheal tube insertion site: oral  Blade: Khris  Blade size: #3  ETT size: 7.0 mm  Cormack-Lehane Classification: grade I - full view of glottis  Placement verified by: chest auscultation and capnometry   Measured from: lips  ETT to lips (cm): 22  Number of attempts at approach: 1    Additional Comments   ett cuff up at MOP

## 2018-04-02 NOTE — OP NOTE
Operative note    Pre-op diagnosis: L5-S1 herniated disc with significant retrolisthesis and resultant instability after decompression    Postoperative diagnosis: same    Procedure: 5-S1 laminectomy and left discectomy bilateral posterior lateral fusion with AcroMed expedient instrumentation with local bone graft, right iliac marrow aspiration, and Mastergraft bone graft substitute.    Surgeon: Venkatesh Valero Jr, M.D.    Asst.: Conchis Myers    EBL: Cc    Anesthetic: Gen.    Condition: Satisfactory      Description of procedure: Patient was anesthetized and positioned prone.  Bony prominences were well padded.  The back was prepped and draped in sterile fashion.  Incision was made down to lumbodorsal fascia.  The muscle was stripped subperiosteally to the tips of transverse processes.  Curettes and rongeurs removed adherent soft tissue.  Packs were placed for hemostasis.  The graft was decorticated.  A Steffee probe was inserted at the intersection of the anatomic landmarks.  A flexible probe was inserted to check the integrity of the pedicle.  Arvind were placed at L5 and S1 bilaterally.  Contoured rods were later added, nuts were tightened and torqued.  Biplanar image intensification showed appropriate screw position and anatomic level and satisfactory posture.  I performed a laminectomy for decompression.   The interspinous ligament was excised.  The upper lamina was removed completely out to within 8-10 mm of the pars intra-articularis.  A small portion of the cephalad aspect of the caudal lamina was excised with a Kerrison rongeur.  Medial facetectomies were performed bilaterally using Kerrison rongeur and osteotomes.  A high-speed cezar was used as well.  Foraminotomies were accomplished with a foraminotomy rongeur.   Large left disc was present a protrusion which was the chickpea sized was incised in then removed piecemeal after which I could more easily retract the passing S1 nerve roots toward the medial  aspect also there was some tightness far laterally where I passed the ball probe because of the far lateral aspect of the protrusion which improved after its removal..  At that.  Upon completion of the decompression I could pass a ball probe through the affected foramina, and easily retract  the nerve roots  toward the midline.  Bleeding was controlled with bipolar cautery,and Gelfoam with thrombin and/ or FloSeal hemostatic matrix.  No dural trauma was sustained, and no CSF leakage was noted.   Bone marrow was obtained from the right iliac crest.  The marrow was applied to the master graft sponges.  Laminectomy bone, and bone from decortication of the graft bed was cleaned at the back table throughout the case and available for bone graft.  The morcellized bone was placed in the decorticated lateral gutter bilaterally.  Master graft sponges were then placed.  Hemostasis was assured.  The wound was then closed with running and interrupted #1 Vicryl for the dorsal fascia, 2-0 Vicryl subcutaneously, then 4-0 Monocryl and Dermabond for the skin.  A sterile dressing was applied.  The patient is about to be recovered.

## 2018-04-02 NOTE — ANESTHESIA POSTPROCEDURE EVALUATION
"Patient: Terra Deutsch    Procedure Summary     Date:  04/02/18 Room / Location:  [facility] OR [address] [place] [address] MAIN OR    Anesthesia Start:  0738 Anesthesia Stop:  1021    Procedure:  L5-S1 discectomy and fusion with instrumentation (Left Spine Lumbar) Diagnosis:       Herniated lumbar intervertebral disc      Spondylolisthesis of lumbar region      (Herniated lumbar intervertebral disc [M51.26])      (Spondylolisthesis of lumbar region [M43.16])    Surgeon:  Venkatesh Valero MD Provider:  Arturo Gutierrez MD    Anesthesia Type:  general ASA Status:  3          Anesthesia Type: general  Last vitals  BP   119/83 (04/02/18 1105)   Temp   36.7 °C (98 °F) (04/02/18 1018)   Pulse   85 (04/02/18 1105)   Resp   12 (04/02/18 1105)     SpO2   96 % (04/02/18 1105)     Post Anesthesia Care and Evaluation    Patient location during evaluation: bedside  Patient participation: complete - patient participated  Level of consciousness: sleepy but conscious  Pain score: 0  Pain management: adequate  Airway patency: patent  Anesthetic complications: No anesthetic complications    Cardiovascular status: acceptable  Respiratory status: acceptable  Hydration status: acceptable    Comments: /83   Pulse 85   Temp 36.7 °C (98 °F) (Oral)   Resp 12   Ht 162.6 cm (64.02\")   Wt 98.5 kg (217 lb 4 oz)   LMP 07/17/2017   SpO2 96%   BMI 37.27 kg/m²         "

## 2018-04-02 NOTE — ANESTHESIA PREPROCEDURE EVALUATION
Anesthesia Evaluation     Patient summary reviewed and Nursing notes reviewed                Airway   Mallampati: II  TM distance: <3 FB  Neck ROM: full  No difficulty expected  Dental    (+) upper dentures    Pulmonary - normal exam   (+) a smoker Former, COPD, asthma, sleep apnea,   Cardiovascular - normal exam        Neuro/Psych  (+) numbness, psychiatric history Anxiety,       ROS Comment: Left leg numbness  GI/Hepatic/Renal/Endo    (+) obesity,  GERD, PUD,      Musculoskeletal     (+) back pain, radiculopathy Left lower extremity  Abdominal   (+) obese,    Substance History      OB/GYN          Other                        Anesthesia Plan    ASA 3     general     intravenous induction   Anesthetic plan and risks discussed with patient.

## 2018-04-02 NOTE — H&P
Patient presents with   • Lumbar Spine - Establish Care, Pain         HPI: She complains of a 5 year history of low back pain which radiates into the left buttock and lower extremity to the foot.  The back pain about equals the leg pain and is severe, constant, stabbing and burning worse with activity.  Anti-inflammatories have helped somewhat but steroids intramuscularly helped more.  Physical therapy ×2 series hasn't helped     PFSH: See chart- reviewed     Review of Systems   Constitutional: Negative for chills, fever and unexpected weight change.   HENT: Negative for trouble swallowing and voice change.    Eyes: Negative for visual disturbance.   Respiratory: Negative for cough and shortness of breath.    Cardiovascular: Negative for chest pain and leg swelling.   Gastrointestinal: Positive for abdominal pain and nausea. Negative for vomiting.   Endocrine: Negative for cold intolerance and heat intolerance.   Genitourinary: Negative for difficulty urinating, frequency and urgency.   Musculoskeletal: Positive for arthralgias and myalgias.   Skin: Negative for rash and wound.   Allergic/Immunologic: Negative for immunocompromised state.   Neurological: Positive for numbness (left leg shooting pain numbness and tingling). Negative for weakness.   Hematological: Does not bruise/bleed easily.   Psychiatric/Behavioral: Positive for sleep disturbance. Negative for dysphoric mood. The patient is not nervous/anxious.          PE: Constitutional: Vital signs above-noted.  Awake, alert and oriented     Psychiatric: Affect and insight do not appear grossly disturbed.     Pulmonary: Breathing is unlabored, color is good.     Skin: Warm, dry and normal turgor     Cardiac:  pedal pulses intact.  No edema.     Eyesight and hearing appear adequate for examination purposes        Musculoskeletal:  There is mild tenderness to percussion and palpation of the spine. Motion appears undisturbed.  Posture is unremarkable to coronal  and sagittal inspection.    The skin about the area is notable for a butterfly in a tree theme.  There is no palpable or visible deformity.  There is no local spasm.       Neurologic:   Reflexes are 2+ and symmetrical in the patellae and diminished in the Achilles.   Motor function is undisturbed in quadriceps, EHL, and gastrocnemius on the right and there is minimal EHL weakness on the left.   Sensation appears symmetrically intact to light touch except for dysesthetic area on the lateral aspect of the left leg..  In the bilateral lower extremities there is no evidence of atrophy.   Clonus is absent..  Gait appears undisturbed.  SLR test negative        MEDICAL DECISION MAKING     XRAY: Plain film x-rays the lumbar spine show L5-S1 disc space narrowing and otherwise minimal degenerative change on 4 views from outside.  I reviewed the radiologist's report with which I agree.     Other: n/a     Impression: Lumbar back pain with radiculopathy, worsening requiring further evaluation    Plan:[]Manual[]Template  []Copied  She complains of continued back and leg pain predominantly the latter but a significant amount of axial pain.  She is failed to sustain lasting relief with epidural injections which did help significantly temporarily especially after the second one.  She is undergone physical therapy and has been dealing with this for nearly 5 years.  On examination she has some numbness in the lateral aspect of the left leg, slight weakness of dorsiflexion of the left foot, positive straight leg raise test and slight weakening of Achilles reflex.  Lumbar MRI demonstrates disc protrusion on the left side at L5-S1 and 4 mm of retrolisthesis and significant disc space flattening is noted on plain film x-ray of the lumbar spine.  The remainder of the spine looks quite good on both plain films and MRI and I reviewed the reports thereof.  I think she is a reasonable candidate for lumbosacral discectomy and fusion with  instrumentation.I discussed the risks and benefits of posterior spinal fusion, including where applicable, laminectomy.  Risks include adverse anesthetic events such as death, stroke and myocardial infarction.  More specific surgical risks include infection, nonunion, hardware failure, spinal fluid leakage, nerve root injury or paralysis, visceral or vascular injury, persistent pain, deep venous thrombosis, and pulmonary embolism among others. There is a 70 to 90 % chance of success.   Alternatives have been discussed.  After careful consideration the patient wishes to proceed with surgery.  25 minutes was spent in face-to-face consultation.

## 2018-04-02 NOTE — PLAN OF CARE
Problem: Patient Care Overview  Goal: Plan of Care Review  Outcome: Ongoing (interventions implemented as appropriate)   04/02/18 5140   Coping/Psychosocial   Plan of Care Reviewed With patient;daughter;spouse   Plan of Care Review   Progress no change   OTHER   Outcome Summary Pt new admit to flloor. Able to make all needs known. Education provided on PCA use as well as prevention of constipation. Has LILA, however, is noncompliant and does not use CPAP machine at home. O2 sats drop significantly while asleep. Continue to monitor.       Problem: Pain, Acute (Adult)  Goal: Identify Related Risk Factors and Signs and Symptoms  Outcome: Ongoing (interventions implemented as appropriate)    Goal: Acceptable Pain Control/Comfort Level  Outcome: Ongoing (interventions implemented as appropriate)

## 2018-04-02 NOTE — INTERVAL H&P NOTE
H&P updated. The patient was examined and her wbc remains elevated, as it has been for over a year.  I think she is safe to proceed with surgery, but we will ask hematolotgy to see while she is here.

## 2018-04-02 NOTE — CONSULTS
Subjective     REASON FOR CONSULTATION:  Chronic leukocytosis  Provide an opinion on any further workup or treatment                             REQUESTING PHYSICIAN:  Dr. Venkatesh Valero    RECORDS OBTAINED:  Records of the patients history including those obtained from the referring provider were reviewed and summarized in detail.    HISTORY OF PRESENT ILLNESS:  The patient is a 37 y.o. year old female who is here for an opinion about the above issue.    History of Present Illness patient is a 37-year-old female who has been followed by orthopedics Dr. Valero and was admitted with low back pain radiating to the left buttock and lower extremity foot.  The back pain was thought to be very severe.  Anti-inflammatory have helped somewhat.  She also has had intramuscular steroid injections before.  Physical therapy also has been done in the past.    Per orthopedic she had some slight weakness of dorsiflexion of the left.  And had a positive straight leg raising test.  Lumbar MRI showed disc protrusion at the left side of L5-S1.  The remainder of the spine looked good.  She was thought to be a patient followed lumbosacral discectomy.  She underwent surgery today that she had L5-S1 laminectomy and left discectomy with bilateral posterior lateral fusion.  We were asked to evaluate her chronic leukocytosis.    On admission her hemoglobin was 15.1 hematocrit 45.5, white count of 14.04 and a platelet count of 369.  As of today her hemoglobin is 14.4 hematocrit is 43.3, white count is 17.54 with a platelet of 365.  She is 75% neutrophils 18.7% lymphocytes 4.6% monocytes.    Looking back in May 2017 her white count was 13.7.    Patient had undergone hysterectomy with bilateral file polyp in.  It showed indeterminate.  Most necrotic suggestive prior ablation changes.  Mild Chronic endocervicitis.  Left ovary showed cystic involution.    We have been asked to evaluate chronic leukocytosis..  Past Medical History:   Diagnosis  Date   • Abdominal pain     LEFT SIDE   • Anxiety    • Asthma    • COPD (chronic obstructive pulmonary disease)    • COPD (chronic obstructive pulmonary disease)     early signs   • GERD (gastroesophageal reflux disease)    • History of high blood pressure     NO MEDS SINCE 7/2017   • History of transfusion     POST TUBALIGATION   • Lower back pain    • Lumbar herniated disc    • Numbness     LEFT LEG   • Panic attack    • Sleep apnea     NO C-PAP IN USE   • Ulcer 2017    ENDOSCOPY        Past Surgical History:   Procedure Laterality Date   • COLONOSCOPY N/A 7/12/2017    - Internal hemorrhoids. Anal papilla(e) were hypertrophied One 4 mm polyp in the descending colon,One 4 mm polyp in the rectum, removed with a cold biopsy forceps. Resected and retrieved removed with a cold biopsy forceps.   • D&C CERVICAL BIOPSY     • ENDOSCOPY N/A 7/12/2017    Procedure: ESOPHAGOGASTRODUODENOSCOPY with bx;  Surgeon: Cherrie Lazaro MD;  Location: [facility] ENDOSCOPY;  Service:    • TOTAL LAPAROSCOPIC HYSTERECTOMY Bilateral 7/24/2017    Procedure: TOTAL LAPAROSCOPIC HYSTERECTOMY WITH DAVINCI ROBOT MATTI SALPINGECTOMY LEFT OOPHORECTOMY;  Surgeon: Julianna Saenz MD;  Location: [facility] MAIN OR;  Service:    • TUBAL ABDOMINAL LIGATION          No current facility-administered medications on file prior to encounter.      Current Outpatient Prescriptions on File Prior to Encounter   Medication Sig Dispense Refill   • busPIRone (BUSPAR) 15 MG tablet Take 15 mg by mouth 3 (Three) Times a Day.     • cetirizine (zyrTEC) 10 MG tablet Take 10 mg by mouth Daily.     • cholecalciferol (VITAMIN D3) 1000 UNITS tablet Take 2,000 Units by mouth Daily. HOLD PRIOR TO SURG     • cyclobenzaprine (FLEXERIL) 10 MG tablet Take 10 mg by mouth 3 (Three) Times a Day As Needed.     • LORazepam (ATIVAN) 0.5 MG tablet Take 0.5 mg by mouth Every 8 (Eight) Hours As Needed for Anxiety (1-2 tabs TID).     • omeprazole (priLOSEC) 40 MG capsule Take 1 capsule by mouth  Every Morning Before Breakfast. 30 capsule 2   • PROAIR  (90 BASE) MCG/ACT inhaler Inhale 1 puff Every 4 (Four) Hours As Needed.          ALLERGIES:    Allergies   Allergen Reactions   • Pyridium [Phenazopyridine Hcl]      UNKNOWN   • Bactrim [Sulfamethoxazole-Trimethoprim] Palpitations   • Penicillins Palpitations        Social History     Social History   • Marital status:      Social History Main Topics   • Smoking status: Former Smoker     Packs/day: 1.00     Years: 14.00     Types: Cigarettes     Start date: 2000     Quit date: 3/27/2018   • Smokeless tobacco: Never Used   • Alcohol use No   • Drug use: No   • Sexual activity: Defer     Other Topics Concern   • Not on file        Family History   Problem Relation Age of Onset   • Heart disease Mother    • Hypertension Mother    • Heart disease Sister    • Malig Hyperthermia Neg Hx         Review of Systems   Constitutional: Negative for appetite change, fatigue and unexpected weight change.   Respiratory: Negative for shortness of breath and wheezing.    Cardiovascular: Negative for chest pain and leg swelling.   Gastrointestinal: Negative for abdominal distention, abdominal pain, nausea and vomiting.   All other systems reviewed and are negative.     Patient has pain at the surgical site and is on IV pain meds.    Objective     Vitals:    04/02/18 1135 04/02/18 1150 04/02/18 1205 04/02/18 1215   BP: 115/72 114/81 115/72    BP Location:       Patient Position:       Pulse: 89 72 96    Resp: 12 12 12    Temp:    97.5 °F (36.4 °C)   TempSrc:    Oral   SpO2: 97% 97% 93%    Weight:       Height:         No flowsheet data found.    Physical Exam    GENERAL:  Well-developed, well-nourished in no acute distress.   THROAT:  Oropharynx without lesions or exudates.  NECK:  Supple with good range of motion; no thyromegaly or masses, no JVD.  LYMPHATICS:  No cervical, supraclavicular, axillary or inguinal adenopathy.  CHEST:  Lungs clear to auscultation. Good  airflow.  CARDIAC:  Regular rate and rhythm without murmurs, rubs or gallops. Normal S1,S2.  ABDOMEN:  Soft, nontender with no hepatosplenomegaly or masses.  EXTREMITIES:  No clubbing, cyanosis or edema.  NEUROLOGICAL:  Cranial Nerves II-XII grossly intact.  No focal neurological deficits.  PSYCHIATRIC:  Normal affect and mood.    Take this post second spine surgery, no evidence of bleeding at the site    RECENT LABS:  Hematology WBC   Date Value Ref Range Status   04/02/2018 17.54 (H) 4.50 - 10.70 10*3/mm3 Final     RBC   Date Value Ref Range Status   04/02/2018 4.53 3.90 - 5.20 10*6/mm3 Final     Hemoglobin   Date Value Ref Range Status   04/02/2018 14.4 11.9 - 15.5 g/dL Final     Hematocrit   Date Value Ref Range Status   04/02/2018 43.3 35.6 - 45.5 % Final     Platelets   Date Value Ref Range Status   04/02/2018 365 140 - 500 10*3/mm3 Final          Assessment/Plan   1.  Status post L5 to S1 laminectomy and left discectomy this a.m.  Patient has pain related to surgery and is on a pain pump.    2.  Chronic leukocytosis.  Patient states that she is a smoker and quit smoking before her surgery this time.  She does get bronchitis on and off.  Looking back at her lab her blood counts her white count has been anywhere in the range of 14,000 to 22,000.  She has a normal differential count.  She is afebrile.  She does not have any chills.  Currently she denies any bronchitis.  Given the chronic nature of her leukocytosis we will repeat check a flow cytometry, RICKY 2 mutation and BCR C Conner to rule out myeloproliferative disorders.  We'll also check a ESR and C-reactive protein.  She does complain of some left upper quadrant pain on and off.  We will observe at present.  If she complains of chronic pain then we may need to obtain a CT scan of the chest abdomen pelvis months she is improved.    We will follow along with you.    Ester Stevens MD

## 2018-04-03 LAB
ALBUMIN SERPL-MCNC: 3.4 G/DL (ref 3.5–5.2)
ALBUMIN/GLOB SERPL: 1.2 G/DL
ALP SERPL-CCNC: 60 U/L (ref 39–117)
ALT SERPL W P-5'-P-CCNC: 18 U/L (ref 1–33)
ANION GAP SERPL CALCULATED.3IONS-SCNC: 12.8 MMOL/L
AST SERPL-CCNC: 29 U/L (ref 1–32)
BILIRUB SERPL-MCNC: 0.2 MG/DL (ref 0.1–1.2)
BUN BLD-MCNC: 5 MG/DL (ref 6–20)
BUN/CREAT SERPL: 8.3 (ref 7–25)
CALCIUM SPEC-SCNC: 8.8 MG/DL (ref 8.6–10.5)
CHLORIDE SERPL-SCNC: 101 MMOL/L (ref 98–107)
CO2 SERPL-SCNC: 26.2 MMOL/L (ref 22–29)
CREAT BLD-MCNC: 0.6 MG/DL (ref 0.57–1)
DEPRECATED RDW RBC AUTO: 47.2 FL (ref 37–54)
ERYTHROCYTE [DISTWIDTH] IN BLOOD BY AUTOMATED COUNT: 12.9 % (ref 11.7–13)
GFR SERPL CREATININE-BSD FRML MDRD: 111 ML/MIN/1.73
GLOBULIN UR ELPH-MCNC: 2.8 GM/DL
GLUCOSE BLD-MCNC: 129 MG/DL (ref 65–99)
HCT VFR BLD AUTO: 39.1 % (ref 35.6–45.5)
HGB BLD-MCNC: 12.2 G/DL (ref 11.9–15.5)
LYMPHOCYTES # BLD MANUAL: 4 10*3/MM3 (ref 0.9–4.8)
LYMPHOCYTES NFR BLD MANUAL: 15 % (ref 19.6–45.3)
LYMPHOCYTES NFR BLD MANUAL: 6 % (ref 5–12)
MCH RBC QN AUTO: 31.2 PG (ref 26.9–32)
MCHC RBC AUTO-ENTMCNC: 31.2 G/DL (ref 32.4–36.3)
MCV RBC AUTO: 100 FL (ref 80.5–98.2)
MONOCYTES # BLD AUTO: 1.6 10*3/MM3 (ref 0.2–1.2)
NEUTROPHILS # BLD AUTO: 20.8 10*3/MM3 (ref 1.9–8.1)
NEUTROPHILS NFR BLD MANUAL: 78 % (ref 42.7–76)
PLAT MORPH BLD: NORMAL
PLATELET # BLD AUTO: 314 10*3/MM3 (ref 140–500)
PMV BLD AUTO: 9.5 FL (ref 6–12)
POTASSIUM BLD-SCNC: 4.1 MMOL/L (ref 3.5–5.2)
PROT SERPL-MCNC: 6.2 G/DL (ref 6–8.5)
RBC # BLD AUTO: 3.91 10*6/MM3 (ref 3.9–5.2)
SODIUM BLD-SCNC: 140 MMOL/L (ref 136–145)
STOMATOCYTES BLD QL SMEAR: ABNORMAL
VARIANT LYMPHS NFR BLD MANUAL: 1 % (ref 0–5)
WBC MORPH BLD: NORMAL
WBC NRBC COR # BLD: 26.67 10*3/MM3 (ref 4.5–10.7)

## 2018-04-03 PROCEDURE — 25810000003 POTASSIUM CHLORIDE PER 2 MEQ: Performed by: ORTHOPAEDIC SURGERY

## 2018-04-03 PROCEDURE — 99232 SBSQ HOSP IP/OBS MODERATE 35: CPT | Performed by: INTERNAL MEDICINE

## 2018-04-03 PROCEDURE — 85007 BL SMEAR W/DIFF WBC COUNT: CPT | Performed by: INTERNAL MEDICINE

## 2018-04-03 PROCEDURE — 80053 COMPREHEN METABOLIC PANEL: CPT | Performed by: INTERNAL MEDICINE

## 2018-04-03 PROCEDURE — 99024 POSTOP FOLLOW-UP VISIT: CPT | Performed by: ORTHOPAEDIC SURGERY

## 2018-04-03 PROCEDURE — 97161 PT EVAL LOW COMPLEX 20 MIN: CPT

## 2018-04-03 PROCEDURE — 97110 THERAPEUTIC EXERCISES: CPT

## 2018-04-03 PROCEDURE — 85027 COMPLETE CBC AUTOMATED: CPT | Performed by: INTERNAL MEDICINE

## 2018-04-03 RX ORDER — SENNA AND DOCUSATE SODIUM 50; 8.6 MG/1; MG/1
1 TABLET, FILM COATED ORAL 2 TIMES DAILY
Status: DISCONTINUED | OUTPATIENT
Start: 2018-04-03 | End: 2018-04-04 | Stop reason: HOSPADM

## 2018-04-03 RX ADMIN — POLYETHYLENE GLYCOL 3350 17 G: 17 POWDER, FOR SOLUTION ORAL at 09:45

## 2018-04-03 RX ADMIN — OXYCODONE HYDROCHLORIDE AND ACETAMINOPHEN 2 TABLET: 5; 325 TABLET ORAL at 05:26

## 2018-04-03 RX ADMIN — POTASSIUM CHLORIDE AND SODIUM CHLORIDE 100 ML/HR: 450; 150 INJECTION, SOLUTION INTRAVENOUS at 15:15

## 2018-04-03 RX ADMIN — CYCLOBENZAPRINE 10 MG: 10 TABLET, FILM COATED ORAL at 12:26

## 2018-04-03 RX ADMIN — OXYCODONE HYDROCHLORIDE AND ACETAMINOPHEN 2 TABLET: 5; 325 TABLET ORAL at 13:25

## 2018-04-03 RX ADMIN — LORAZEPAM 0.5 MG: 0.5 TABLET ORAL at 05:27

## 2018-04-03 RX ADMIN — PANTOPRAZOLE SODIUM 40 MG: 40 TABLET, DELAYED RELEASE ORAL at 06:30

## 2018-04-03 RX ADMIN — CYCLOBENZAPRINE 10 MG: 10 TABLET, FILM COATED ORAL at 01:35

## 2018-04-03 RX ADMIN — CYCLOBENZAPRINE 10 MG: 10 TABLET, FILM COATED ORAL at 21:27

## 2018-04-03 RX ADMIN — OXYCODONE HYDROCHLORIDE AND ACETAMINOPHEN 2 TABLET: 5; 325 TABLET ORAL at 01:30

## 2018-04-03 RX ADMIN — DOCUSATE SODIUM -SENNOSIDES 1 TABLET: 50; 8.6 TABLET, COATED ORAL at 21:27

## 2018-04-03 RX ADMIN — LORAZEPAM 0.5 MG: 0.5 TABLET ORAL at 13:25

## 2018-04-03 RX ADMIN — OXYCODONE HYDROCHLORIDE AND ACETAMINOPHEN 2 TABLET: 5; 325 TABLET ORAL at 09:29

## 2018-04-03 RX ADMIN — OXYCODONE HYDROCHLORIDE AND ACETAMINOPHEN 2 TABLET: 5; 325 TABLET ORAL at 17:21

## 2018-04-03 RX ADMIN — OXYCODONE HYDROCHLORIDE AND ACETAMINOPHEN 2 TABLET: 5; 325 TABLET ORAL at 21:27

## 2018-04-03 RX ADMIN — BUSPIRONE HYDROCHLORIDE 15 MG: 15 TABLET ORAL at 21:27

## 2018-04-03 RX ADMIN — POTASSIUM CHLORIDE AND SODIUM CHLORIDE 100 ML/HR: 450; 150 INJECTION, SOLUTION INTRAVENOUS at 03:37

## 2018-04-03 RX ADMIN — Medication: at 04:20

## 2018-04-03 NOTE — PLAN OF CARE
Problem: Patient Care Overview  Goal: Plan of Care Review   04/03/18 141   Coping/Psychosocial   Plan of Care Reviewed With patient   Plan of Care Review   Progress improving   OTHER   Outcome Summary Improved tolerance to functional activity this PM with an increase in gait distance and initiation of stair training.

## 2018-04-03 NOTE — PROGRESS NOTES
Subjective     REASON FOR follow up  Chronic leukocytosis  Provide an opinion on any further workup or treatment                             REQUESTING PHYSICIAN:  Dr. Venkatesh Enrique    RECORDS OBTAINED:  Records of the patients history including those obtained from the referring provider were reviewed and summarized in detail.    HISTORY OF PRESENT ILLNESS:  The patient is a 37 y.o. year old female who is here for an opinion about the above issue.    History of Present Illness patient is a 37-year-old female with chronic leukocytosis since last year.  She does smoke and that's possible cause for this.  She is status postsurgery on her spine at L5-S1 and now it leukocytosis.  In the past she has gone as high as 22,000 and today she is 26,000 not 2 different.  She does not side show any signs of infection.    We have obtained flow, Jaspreet 2, BCR C Conner but the results are pending.  ESR is 18 normal, C-reactive protein is mildly elevated at 1.21.  She is afebrile.  Her blood pressure is normal.  Her heart rate is mildly elevated at 109.  She does not show signs of infection.  There likely going to discharge tomorrow.      Hematologic history:    patient is a 37-year-old female who has been followed by orthopedics Dr. Valero and was admitted with low back pain radiating to the left buttock and lower extremity foot.  The back pain was thought to be very severe.  Anti-inflammatory have helped somewhat.  She also has had intramuscular steroid injections before.  Physical therapy also has been done in the past.     Per orthopedic she had some slight weakness of dorsiflexion of the left.  And had a positive straight leg raising test.  Lumbar MRI showed disc protrusion at the left side of L5-S1.  The remainder of the spine looked good.  She was thought to be a patient followed lumbosacral discectomy.  She underwent surgery today that she had L5-S1 laminectomy and left discectomy with bilateral posterior lateral fusion.  We  were asked to evaluate her chronic leukocytosis.     On admission her hemoglobin was 15.1 hematocrit 45.5, white count of 14.04 and a platelet count of 369.  As of today her hemoglobin is 14.4 hematocrit is 43.3, white count is 17.54 with a platelet of 365.  She is 75% neutrophils 18.7% lymphocytes 4.6% monocytes.     Looking back in May 2017 her white count was 13.7.     Patient had undergone hysterectomy with bilateral file polyp in.  It showed indeterminate.  Most necrotic suggestive prior ablation changes.  Mild Chronic endocervicitis.  Left ovary showed cystic involution.     We have been asked to evaluate chronic leukocytosis..    Past Medical History:   Diagnosis Date   • Abdominal pain     LEFT SIDE   • Anxiety    • Asthma    • COPD (chronic obstructive pulmonary disease)    • COPD (chronic obstructive pulmonary disease)     early signs   • GERD (gastroesophageal reflux disease)    • History of high blood pressure     NO MEDS SINCE 7/2017   • History of transfusion     POST TUBALIGATION   • Lower back pain    • Lumbar herniated disc    • Numbness     LEFT LEG   • Panic attack    • Sleep apnea     NO C-PAP IN USE   • Ulcer 2017    ENDOSCOPY        Past Surgical History:   Procedure Laterality Date   • COLONOSCOPY N/A 7/12/2017    - Internal hemorrhoids. Anal papilla(e) were hypertrophied One 4 mm polyp in the descending colon,One 4 mm polyp in the rectum, removed with a cold biopsy forceps. Resected and retrieved removed with a cold biopsy forceps.   • D&C CERVICAL BIOPSY     • ENDOSCOPY N/A 7/12/2017    Procedure: ESOPHAGOGASTRODUODENOSCOPY with bx;  Surgeon: Cherrie Lazaro MD;  Location: [facility] ENDOSCOPY;  Service:    • LUMBAR DISCECTOMY FUSION INSTRUMENTATION Left 4/2/2018    Procedure: L5-S1 discectomy and fusion with instrumentation;  Surgeon: Venkatesh Valero MD;  Location: [facility] OR;  Service: Neurosurgery   • TOTAL LAPAROSCOPIC HYSTERECTOMY Bilateral 7/24/2017    Procedure: TOTAL LAPAROSCOPIC  HYSTERECTOMY WITH DAVINCI ROBOT MATTI SALPINGECTOMY LEFT OOPHORECTOMY;  Surgeon: Julianna Saenz MD;  Location: [facility] MAIN OR;  Service:    • TUBAL ABDOMINAL LIGATION          No current facility-administered medications on file prior to encounter.      Current Outpatient Prescriptions on File Prior to Encounter   Medication Sig Dispense Refill   • busPIRone (BUSPAR) 15 MG tablet Take 15 mg by mouth 3 (Three) Times a Day.     • cetirizine (zyrTEC) 10 MG tablet Take 10 mg by mouth Daily.     • cholecalciferol (VITAMIN D3) 1000 UNITS tablet Take 2,000 Units by mouth Daily. HOLD PRIOR TO SURG     • cyclobenzaprine (FLEXERIL) 10 MG tablet Take 10 mg by mouth 3 (Three) Times a Day As Needed.     • LORazepam (ATIVAN) 0.5 MG tablet Take 0.5 mg by mouth Every 8 (Eight) Hours As Needed for Anxiety (1-2 tabs TID).     • omeprazole (priLOSEC) 40 MG capsule Take 1 capsule by mouth Every Morning Before Breakfast. 30 capsule 2   • PROAIR  (90 BASE) MCG/ACT inhaler Inhale 1 puff Every 4 (Four) Hours As Needed.          ALLERGIES:    Allergies   Allergen Reactions   • Pyridium [Phenazopyridine Hcl]      UNKNOWN   • Bactrim [Sulfamethoxazole-Trimethoprim] Palpitations   • Penicillins Palpitations        Social History     Social History   • Marital status:      Social History Main Topics   • Smoking status: Former Smoker     Packs/day: 1.00     Years: 14.00     Types: Cigarettes     Start date: 2000     Quit date: 3/27/2018   • Smokeless tobacco: Never Used   • Alcohol use No   • Drug use: No   • Sexual activity: Defer     Other Topics Concern   • Not on file        Family History   Problem Relation Age of Onset   • Heart disease Mother    • Hypertension Mother    • Heart disease Sister    • Malig Hyperthermia Neg Hx         Review of Systems patient is not in so much pain today.  She denies chest pain shortness of breath or cough.  No other signs of infection.  She has no nausea vomiting or abdominal pain.  She  does have some pain at the surgical site.    Objective     Vitals:    04/02/18 1948 04/02/18 2300 04/03/18 0305 04/03/18 0757   BP: 123/78 110/86 117/78 104/72   BP Location: Left arm Left arm Left arm Left arm   Patient Position: Lying Sitting Lying Sitting   Pulse: 98 97 89 109   Resp: 16 16 16 16   Temp: 97.6 °F (36.4 °C)  97.7 °F (36.5 °C) 98.4 °F (36.9 °C)   TempSrc: Oral  Oral Oral   SpO2: 98% 98% 98% 96%   Weight:       Height:         No flowsheet data found.    Physical Exam    GENERAL:  Well-developed, well-nourished in no acute distress.   CHEST:  Lungs clear to auscultation. Good airflow.  CARDIAC:  Regular rate and rhythm without murmurs, rubs or gallops. Normal S1,S2.  ABDOMEN:  Soft, nontender with no hepatosplenomegaly or masses.  EXTREMITIES:  No clubbing, cyanosis or edema.  NEUROLOGICAL:  Cranial Nerves II-XII grossly intact.  No focal neurological deficits.  PSYCHIATRIC:  Normal affect and mood.    Status post spine surgery L5-S1 laminectomy    RECENT LABS:  Hematology WBC   Date Value Ref Range Status   04/03/2018 26.67 (H) 4.50 - 10.70 10*3/mm3 Final     RBC   Date Value Ref Range Status   04/03/2018 3.91 3.90 - 5.20 10*6/mm3 Final     Hemoglobin   Date Value Ref Range Status   04/03/2018 12.2 11.9 - 15.5 g/dL Final     Hematocrit   Date Value Ref Range Status   04/03/2018 39.1 35.6 - 45.5 % Final     Platelets   Date Value Ref Range Status   04/03/2018 314 140 - 500 10*3/mm3 Final          Assessment/Plan     1.  Status post L5 to S1 laminectomy and left discectomy this a.m.  Patient has pain related to surgery and is on a pain pump.     2.  Chronic leukocytosis.  Patient states that she is a smoker and quit smoking before her surgery this time.  She does get bronchitis on and off.  Looking back at her lab her blood counts her white count has been anywhere in the range of 14,000 to 22,000.  She has a normal differential count.  She is afebrile.  She does not have any chills.  Currently she  denies any bronchitis.  Given the chronic nature of her leukocytosis we will repeat check a flow cytometry, RICKY 2 mutation and BCR C Conner to rule out myeloproliferative disorders.    All of the lab are pending.  She does not show any signs of infection.  If she is discharged tomorrow with follow-up in the office.    Ester Stevens MD

## 2018-04-03 NOTE — THERAPY TREATMENT NOTE
Acute Care - Physical Therapy Treatment Note  [facility]     Patient Name: Terra Deutsch  : 1980  MRN: [id number]  Today's Date: 4/3/2018  Onset of Illness/Injury or Date of Surgery: 18  Date of Referral to PT: 18  Referring Physician: Venkatesh Castillo    Admit Date: 2018    Visit Dx:    ICD-10-CM ICD-9-CM   1. Difficulty walking R26.2 719.7   2. Ruptured lumbar intervertebral disc M51.26 722.10   3. Spondylolisthesis of lumbar region M43.16 738.4   4. Herniated lumbar intervertebral disc M51.26 722.10     Patient Active Problem List   Diagnosis   • Lower abdominal pain   • Pelvic pain   • Herniated lumbar intervertebral disc   • Spondylolisthesis of lumbar region   • Spinal stenosis of lumbar region       Therapy Treatment    Therapy Treatment / Health Promotion    Treatment Time/Intention  Discipline: physical therapist (18 1416 : Nik Goel PT)  Document Type: therapy note (daily note) (18 1416 : Nik Goel PT)  Subjective Information: complains of, pain (18 1416 : Nik Goel, DEE)  Mode of Treatment: individual therapy (18 1416 : Nik Goel PT)  Therapy Frequency (PT Clinical Impression): 2 times/day (18 1124 : Nik Goel PT)  Patient Effort: good (18 1416 : Nik Goel PT)  Comment: Pt. reports continued pain/soreness in her lower back this PM. (18 1416 : Nik Goel PT)  Existing Precautions/Restrictions: (S) spinal (Back brace to be worn when OOB) (18 1124 : Nik Goel PT)  Equipment Issued to Patient: walker, front wheeled (18 1416 : Nik Goel PT)  Plan of Care Review  Plan of Care Reviewed With: patient (18 1419 : Nik Goel PT)    Vitals/Pain/Safety  Pain Scale: Numbers Pre/Post-Treatment  Pain Scale: Numbers, Pretreatment: 5/10 (18 141 : Nik Goel, PT)  Pain Scale: Numbers, Post-Treatment: 5/10 (18 : Nik Goel, PT)  Pain  Location - Orientation: lower (04/03/18 1416 : Nik Goel PT)  Pain Location: back (04/03/18 1416 : Nik Goel PT)  Pain Intervention(s): Medication (See MAR), Repositioned, Rest (04/03/18 1416 : Nik Goel PT)  Pain Scale: Word Pre/Post-Treatment  Pain Location - Orientation: lower (04/03/18 1416 : Nik Goel PT)  Pain Location: back (04/03/18 1416 : Nik Goel PT)  Pain Intervention(s): Medication (See MAR), Repositioned, Rest (04/03/18 1416 : Nik Goel PT)  Pain Scale: FACES Pre/Post-Treatment  Pain Location - Orientation: lower (04/03/18 1416 : Nik Goel PT)  Pain Location: back (04/03/18 1416 : Nik Goel PT)  Pain Intervention(s): Medication (See MAR), Repositioned, Rest (04/03/18 1416 : Nik Goel PT)  Positioning and Restraints  Pre-Treatment Position: sitting in chair/recliner (04/03/18 1416 : Nik Goel PT)  Post Treatment Position: chair (04/03/18 1416 : Nik Goel PT)  In Chair: notified nsg, sitting, call light within reach, encouraged to call for assist, exit alarm on (Back brace donned while sitting in chair) (04/03/18 1416 : Nik Goel PT)    Mobility,ADL,Motor, Modality  Bed Mobility Assessment/Treatment  Bed Mobility Assessment/Treatment: supine-sit, sit-supine (04/03/18 1416 : Nik Goel PT)  Supine-Sit [place] (Bed Mobility): independent (04/03/18 1416 : Nik Goel PT)  Sit-Supine [place] (Bed Mobility): independent (04/03/18 1416 : Nik Goel PT)  Comment (Bed Mobility): Via logroll (04/03/18 1416 : Nik Goel PT)  Transfer Assessment/Treatment  Transfer Assessment/Treatment: sit-stand transfer, stand-sit transfer (04/03/18 1124 : Nik Goel, PT)  Comment (Transfers): Pt.'s back brace already donned while sitting in chair (04/03/18 1416 : Nik Goel, PT)  Sit-Stand Transfer  Sit-Stand [place] (Transfers): contact guard (04/03/18 1416 : Nik Goel,  PT)  Assistive Device (Sit-Stand Transfers): walker, front-wheeled (04/03/18 1416 : Nik Goel PT)  Stand-Sit Transfer  Stand-Sit [place] (Transfers): contact guard (04/03/18 1416 : Nik Goel PT)  Assistive Device (Stand-Sit Transfers): walker, front-wheeled (04/03/18 1416 : Nik Goel PT)  Gait/Stairs Assessment/Training  [place] Level (Gait): stand by assist (04/03/18 1416 : Nik Goel PT)  Assistive Device (Gait): walker, front-wheeled (04/03/18 1416 : Nik Goel PT)  Distance in Feet (Gait): 400 feet (04/03/18 1416 : Nik Goel PT)  Pattern (Gait): step-through (04/03/18 1124 : Nik Goel PT)  [place] Level (Stairs): stand by assist (04/03/18 1416 : Nik Goel PT)  Handrail Location (Stairs): both sides (04/03/18 1416 : Nik Goel PT)  Number of Steps (Stairs): 4 (04/03/18 1416 : Nik Goel PT)  Comment (Gait/Stairs): x 1 standing rest break (04/03/18 1124 : Nik Goel PT)                 ROM/MMT  General ROM  GENERAL ROM COMMENTS: BUE/LE (WFL's) (04/03/18 1124 : Nik Goel PT)  General Assessment (Manual Muscle Testing)  Comment, General Manual Muscle Testing (MMT) Assessment: BUE/LE (>/= 3/5) (04/03/18 1124 : Nik Goel PT)       Sensory, Edema, Orthotics          Cognition, Communication, Swallow  Cognitive Assessment/Intervention- PT/OT  Orientation Status (Cognition): oriented x 3 (04/03/18 1416 : Nik Goel PT)  Follows Commands (Cognition): WNL (04/03/18 1416 : Nik Goel PT)  Personal Safety Interventions: fall prevention program maintained, gait belt, nonskid shoes/slippers when out of bed, supervised activity (04/03/18 1416 : Nik Goel, PT)  Clinical Impression  Equipment Issued to Patient: walker, front wheeled (04/03/18 1416 : Nik Goel, PT)    Outcome Summary  Outcome Summary/Treatment Plan (PT)  Anticipated Equipment Needs at Discharge (PT): front wheeled walker  (04/03/18 1124 : Nik Goel, PT)  Anticipated Discharge Disposition (PT): home with home health care (04/03/18 1130 : Nik Goel, PT)            PT Rehab Goals     Row Name 04/03/18 1124             Bed Mobility Goal 1 (PT)    Activity/Assistive Device (Bed Mobility Goal 1, PT) bed mobility activities, all   Via logroll  -MS      [place] Level/Cues Needed (Bed Mobility Goal 1, PT) independent  -MS      Time Frame (Bed Mobility Goal 1, PT) long term goal (LTG);3 days  -MS         Transfer Goal 1 (PT)    Activity/Assistive Device (Transfer Goal 1, PT) transfers, all;walker, rolling  -MS      [place] Level/Cues Needed (Transfer Goal 1, PT) independent  -MS      Time Frame (Transfer Goal 1, PT) long term goal (LTG);2 - 3 days  -MS         Gait Training Goal 1 (PT)    Activity/Assistive Device (Gait Training Goal 1, PT) gait (walking locomotion);walker, rolling  -MS      [place] Level (Gait Training Goal 1, PT) independent  -MS      Distance (Gait Goal 1, PT) 400 feet  -MS      Time Frame (Gait Training Goal 1, PT) long term goal (LTG);2 - 3 days  -MS         Stairs Goal 1 (PT)    Activity/Assistive Device (Stairs Goal 1, PT) stairs, all skills  -MS      [place] Level/Cues Needed (Stairs Goal 1, PT) standby assist  -MS      Number of Stairs (Stairs Goal 1, PT) 3  -MS      Time Frame (Stairs Goal 1, PT) long term goal (LTG);2 - 3 days  -MS        User Key  (r) = Recorded By, (t) = Taken By, (c) = Cosigned By    Initials Name Provider Type    MS Zaragoza FLOR Goel, PT Physical Therapist          Physical Therapy Education     Title: PT OT SLP Therapies (Done)     Topic: Physical Therapy (Done)     Point: Mobility training (Done)    Learning Progress Summary     Learner Status Readiness Method Response Comment Documented by    Patient Done Acceptance BELL AGUAYO NR  MS 04/03/18 1419     Done Acceptance BELL AGUAYO NR  MS 04/03/18 1129          Point: Home exercise program (Done)    Learning Progress  Summary     Learner Status Readiness Method Response Comment Documented by    Patient Done Acceptance E,D VU,NR  MS 04/03/18 1419     Done Acceptance E,D VU,NR  MS 04/03/18 1129          Point: Body mechanics (Done)    Learning Progress Summary     Learner Status Readiness Method Response Comment Documented by    Patient Done Acceptance E,D VU,NR  MS 04/03/18 1419     Done Acceptance ED VU,NR  MS 04/03/18 1129          Point: Precautions (Done)    Learning Progress Summary     Learner Status Readiness Method Response Comment Documented by    Patient Done Acceptance E,D VU,NR  MS 04/03/18 1419     Done Acceptance E,D VU,NR  MS 04/03/18 1129                      User Key     Initials Effective Dates Name Provider Type Discipline    MS 12/01/15 -  Nik Goel, PT Physical Therapist PT                    PT Recommendation and Plan  Anticipated Discharge Disposition (PT): home with home health care  Planned Therapy Interventions (PT Eval): balance training, bed mobility training, gait training, home exercise program, patient/family education, postural re-education, stair training, strengthening, transfer training  Therapy Frequency (PT Clinical Impression): 2 times/day  Outcome Summary/Treatment Plan (PT)  Anticipated Equipment Needs at Discharge (PT): front wheeled walker  Anticipated Discharge Disposition (PT): home with home health care  Plan of Care Reviewed With: patient  Progress: improving  Outcome Summary: Improved tolerance to functional activity this PM with an increase in gait distance and initiation of stair training.          Outcome Measures     Row Name 04/03/18 1400 04/03/18 1100          How much help from another person do you currently need...    Turning from your back to your side while in flat bed without using bedrails? 4  -MS 3  -MS     Moving from lying on back to sitting on the side of a flat bed without bedrails? 4  -MS 3  -MS     Moving to and from a bed to a chair (including a  wheelchair)? 3  -MS 3  -MS     Standing up from a chair using your arms (e.g., wheelchair, bedside chair)? 3  -MS 3  -MS     Climbing 3-5 steps with a railing? 3  -MS 3  -MS     To walk in hospital room? 3  -MS 3  -MS     AM-PAC 6 Clicks Score 20  -MS 18  -MS        Functional Assessment    Outcome Measure Options AM-PAC 6 Clicks Basic Mobility (PT)  -MS AM-PAC 6 Clicks Basic Mobility (PT)  -MS       User Key  (r) = Recorded By, (t) = Taken By, (c) = Cosigned By    Initials Name Provider Type    MS Nik FLOR Goel, PT Physical Therapist           Time Calculation:         PT Charges     Row Name 04/03/18 1420 04/03/18 1130          Time Calculation    Start Time 1402  -MS 1057  -MS     Stop Time 1416  -MS 1112  -MS     Time Calculation (min) 14 min  -MS 15 min  -MS     PT Received On 04/03/18  -MS 04/03/18  -MS     PT - Next Appointment 04/04/18  -MS 04/03/18  -MS     PT Goal Re-Cert Due Date  -- 04/06/18  -MS       User Key  (r) = Recorded By, (t) = Taken By, (c) = Cosigned By    Initials Name Provider Type    MS Nik FLOR Manasa, PT Physical Therapist          Therapy Charges for Today     Code Description Service Date Service Provider Modifiers Qty    68313976543 HC PT EVAL LOW COMPLEXITY 1 4/3/2018 Nik Goel, PT GP 1    35881371662 HC PT THER PROC EA 15 MIN 4/3/2018 Nik Goel, PT GP 1    35034784881 HC PT THER SUPP EA 15 MIN 4/3/2018 Nik Goel, PT GP 1    31823362334 HC PT THER PROC EA 15 MIN 4/3/2018 Nik Goel, PT GP 1          PT G-Codes  Outcome Measure Options: AM-PAC 6 Clicks Basic Mobility (PT)    Nik Goel, PT  4/3/2018

## 2018-04-03 NOTE — PLAN OF CARE
Problem: Patient Care Overview  Goal: Plan of Care Review  Outcome: Ongoing (interventions implemented as appropriate)   04/03/18 0655   Coping/Psychosocial   Plan of Care Reviewed With patient   Plan of Care Review   Progress improving   OTHER   Outcome Summary Pt continues to c/o pain; dilaudid pca and percocet provided. Pt has been up ambulating in room and sitting up in chair multiple times this shift. vss. tolerating oral liquids. no neuro changes.        Problem: Pain, Acute (Adult)  Goal: Identify Related Risk Factors and Signs and Symptoms  Outcome: Ongoing (interventions implemented as appropriate)   04/02/18 1650 04/03/18 0655   Pain, Acute (Adult)   Related Risk Factors (Acute Pain) positioning;procedure/treatment;surgery --    Signs and Symptoms (Acute Pain) --  verbalization of pain descriptors     Goal: Acceptable Pain Control/Comfort Level  Outcome: Ongoing (interventions implemented as appropriate)   04/03/18 0655   Pain, Acute (Adult)   Acceptable Pain Control/Comfort Level making progress toward outcome

## 2018-04-03 NOTE — PLAN OF CARE
Problem: Patient Care Overview  Goal: Plan of Care Review  Outcome: Ongoing (interventions implemented as appropriate)   04/03/18 6967   Coping/Psychosocial   Plan of Care Reviewed With patient   Plan of Care Review   Progress improving   OTHER   Outcome Summary pt POD 1 lami. up with 1 assist w/ walker. brace OOB. c/o moderate incisional pain no n/t. percocet for pain, ativan for anxiety. pt understands to wean from PCA. voiding ok. passing gas. Probable home tomorrow. will continue to monitor.       Problem: Pain, Acute (Adult)  Goal: Identify Related Risk Factors and Signs and Symptoms  Outcome: Outcome(s) achieved Date Met: 04/03/18    Goal: Acceptable Pain Control/Comfort Level  Outcome: Ongoing (interventions implemented as appropriate)

## 2018-04-03 NOTE — THERAPY EVALUATION
Acute Care - Physical Therapy Initial Evaluation  [facility]     Patient Name: Terra Deutsch  : 1980  MRN: [id number]  Today's Date: 4/3/2018   Onset of Illness/Injury or Date of Surgery: 18  Date of Referral to PT: 18  Referring Physician: Venkatesh Castillo      Admit Date: 2018    Visit Dx:     ICD-10-CM ICD-9-CM   1. Difficulty walking R26.2 719.7   2. Ruptured lumbar intervertebral disc M51.26 722.10   3. Spondylolisthesis of lumbar region M43.16 738.4   4. Herniated lumbar intervertebral disc M51.26 722.10     Patient Active Problem List   Diagnosis   • Lower abdominal pain   • Pelvic pain   • Herniated lumbar intervertebral disc   • Spondylolisthesis of lumbar region   • Spinal stenosis of lumbar region     Past Medical History:   Diagnosis Date   • Abdominal pain     LEFT SIDE   • Anxiety    • Asthma    • COPD (chronic obstructive pulmonary disease)    • COPD (chronic obstructive pulmonary disease)     early signs   • GERD (gastroesophageal reflux disease)    • History of high blood pressure     NO MEDS SINCE 2017   • History of transfusion     POST TUBALIGATION   • Lower back pain    • Lumbar herniated disc    • Numbness     LEFT LEG   • Panic attack    • Sleep apnea     NO C-PAP IN USE   • Ulcer 2017    ENDOSCOPY     Past Surgical History:   Procedure Laterality Date   • COLONOSCOPY N/A 2017    - Internal hemorrhoids. Anal papilla(e) were hypertrophied One 4 mm polyp in the descending colon,One 4 mm polyp in the rectum, removed with a cold biopsy forceps. Resected and retrieved removed with a cold biopsy forceps.   • D&C CERVICAL BIOPSY     • ENDOSCOPY N/A 2017    Procedure: ESOPHAGOGASTRODUODENOSCOPY with bx;  Surgeon: Cherrie Lazaro MD;  Location: [facility] ENDOSCOPY;  Service:    • LUMBAR DISCECTOMY FUSION INSTRUMENTATION Left 2018    Procedure: L5-S1 discectomy and fusion with instrumentation;  Surgeon: Venkatesh Valero MD;  Location: [facility] MAIN OR;  Service:  Neurosurgery   • TOTAL LAPAROSCOPIC HYSTERECTOMY Bilateral 7/24/2017    Procedure: TOTAL LAPAROSCOPIC HYSTERECTOMY WITH DAVINCI ROBOT MATTI SALPINGECTOMY LEFT OOPHORECTOMY;  Surgeon: Julianna Saenz MD;  Location: [facility] OR;  Service:    • TUBAL ABDOMINAL LIGATION          PT ASSESSMENT (last 72 hours)      Physical Therapy Evaluation     Row Name 04/03/18 1124          PT Evaluation Time/Intention    Subjective Information complains of;fatigue;pain  -MS     Document Type evaluation  -MS     Mode of Treatment individual therapy  -MS     Patient Effort good  -MS     Comment Pt. reports lower back pain with mobility this AM but otherwise pt. reports she is happy to ambulate  -MS     Row Name 04/03/18 1124          General Information    Onset of Illness/Injury or Date of Surgery 04/02/18  -MS     Referring Physician Venkatesh Castillo  -MS     Prior Level of Function independent:  -MS     Equipment Currently Used at Home none  -MS     Pertinent History of Current Functional Problem Pt. is s/p L5-S1 Discectomy with fusion  -MS     Existing Precautions/Restrictions spinal   Back brace to be worn when OOB  -MS     Risks Reviewed patient:  -MS     Benefits Reviewed patient:  -MS     Barriers to Rehab none identified  -MS     Row Name 04/03/18 1124          Cognitive Assessment/Intervention- PT/OT    Orientation Status (Cognition) oriented x 3  -MS     Follows Commands (Cognition) WNL  -MS     Personal Safety Interventions fall prevention program maintained;gait belt;nonskid shoes/slippers when out of bed;supervised activity  -MS     Row Name 04/03/18 1124          Bed Mobility Assessment/Treatment    Comment (Bed Mobility) Pt. up in chair this AM with back brace already donned.  -MS     Row Name 04/03/18 1124          Transfer Assessment/Treatment    Transfer Assessment/Treatment sit-stand transfer;stand-sit transfer  -MS     Sit-Stand [place] (Transfers) contact guard  -MS     Stand-Sit [place]  (Transfers) contact guard  -MS     Row Name 04/03/18 1124          Sit-Stand Transfer    Assistive Device (Sit-Stand Transfers) walker, front-wheeled  -MS     Row Name 04/03/18 1124          Stand-Sit Transfer    Assistive Device (Stand-Sit Transfers) walker, front-wheeled  -MS     Row Name 04/03/18 1124          Gait/Stairs Assessment/Training    [place] Level (Gait) contact guard;1 person to manage equipment  -MS     Assistive Device (Gait) walker, front-wheeled  -MS     Distance in Feet (Gait) 350 feet  -MS     Pattern (Gait) step-through  -MS     Comment (Gait/Stairs) x 1 standing rest break  -MS     Row Name 04/03/18 1124          General ROM    GENERAL ROM COMMENTS BUE/LE (WFL's)  -MS     Row Name 04/03/18 1124          General Assessment (Manual Muscle Testing)    Comment, General Manual Muscle Testing (MMT) Assessment BUE/LE (>/= 3/5)  -MS     Row Name 04/03/18 1124          Pain Assessment    Additional Documentation Pain Scale: Numbers Pre/Post-Treatment (Group)  -MS     Row Name 04/03/18 1124          Pain Scale: Numbers Pre/Post-Treatment    Pain Scale: Numbers, Pretreatment 7/10  -MS     Pain Scale: Numbers, Post-Treatment 7/10  -MS     Pain Location - Orientation lower  -MS     Pain Location back  -MS     Pain Intervention(s) Medication (See MAR);Repositioned;Rest   PCA pump for pain  -MS     Row Name             Wound 04/02/18 0909 Left back incision    Wound - Properties Group Date first assessed: 04/02/18  -JT Time first assessed: 0909  -JT Side: Left  -JT Location: back  -JT Type: incision  -JT    Row Name 04/03/18 1124          Physical Therapy Clinical Impression    Date of Referral to PT 04/02/18  -MS     Criteria for Skilled Interventions Met (PT Clinical Impression) treatment indicated  -MS     Rehab Potential (PT Clinical Summary) good, to achieve stated therapy goals  -MS     Row Name 04/03/18 1124          Physical Therapy Goals    Bed Mobility Goal Selection (PT) bed mobility, PT goal  1  -MS     Transfer Goal Selection (PT) transfer, PT goal 1  -MS     Gait Training Goal Selection (PT) gait training, PT goal 1  -MS     Stairs Goal Selection (PT) stairs, PT goal 1  -MS     Additional Documentation Stairs Goal Selection (PT) (Row)  -MS     Row Name 04/03/18 1124          Bed Mobility Goal 1 (PT)    Activity/Assistive Device (Bed Mobility Goal 1, PT) bed mobility activities, all   Via logroll  -MS     [place] Level/Cues Needed (Bed Mobility Goal 1, PT) independent  -MS     Time Frame (Bed Mobility Goal 1, PT) long term goal (LTG);3 days  -MS     Row Name 04/03/18 1124          Transfer Goal 1 (PT)    Activity/Assistive Device (Transfer Goal 1, PT) transfers, all;walker, rolling  -MS     [place] Level/Cues Needed (Transfer Goal 1, PT) independent  -MS     Time Frame (Transfer Goal 1, PT) long term goal (LTG);2 - 3 days  -MS     Row Name 04/03/18 1124          Gait Training Goal 1 (PT)    Activity/Assistive Device (Gait Training Goal 1, PT) gait (walking locomotion);walker, rolling  -MS     [place] Level (Gait Training Goal 1, PT) independent  -MS     Distance (Gait Goal 1, PT) 400 feet  -MS     Time Frame (Gait Training Goal 1, PT) long term goal (LTG);2 - 3 days  -MS     Row Name 04/03/18 1124          Stairs Goal 1 (PT)    Activity/Assistive Device (Stairs Goal 1, PT) stairs, all skills  -MS     [place] Level/Cues Needed (Stairs Goal 1, PT) standby assist  -MS     Number of Stairs (Stairs Goal 1, PT) 3  -MS     Time Frame (Stairs Goal 1, PT) long term goal (LTG);2 - 3 days  -MS     Row Name 04/03/18 1124          Positioning and Restraints    Pre-Treatment Position sitting in chair/recliner  -MS     Post Treatment Position chair   back brace donned while pt. sitting in chair  -MS     In Chair notified nsg;sitting;call light within reach;encouraged to call for assist;exit alarm on   All lines intact. Nurse aware of pt.'s position.  -MS       User Key  (r) = Recorded By, (t) =  Taken By, (c) = Cosigned By    Initials Name Provider Type    MS Nik FLOR Goel, PT Physical Therapist    JIA Helms RN Registered Nurse          Physical Therapy Education     Title: PT OT SLP Therapies (Done)     Topic: Physical Therapy (Done)     Point: Mobility training (Done)    Learning Progress Summary     Learner Status Readiness Method Response Comment Documented by    Patient Done Acceptance BELL AGUAYO,NR  MS 04/03/18 1129          Point: Home exercise program (Done)    Learning Progress Summary     Learner Status Readiness Method Response Comment Documented by    Patient Done Acceptance EBELL VU,NR  MS 04/03/18 1129          Point: Body mechanics (Done)    Learning Progress Summary     Learner Status Readiness Method Response Comment Documented by    Patient Done Acceptance EBELL VU,NR  MS 04/03/18 1129          Point: Precautions (Done)    Learning Progress Summary     Learner Status Readiness Method Response Comment Documented by    Patient Done Acceptance BELL AGUAYO,NR  MS 04/03/18 1129                      User Key     Initials Effective Dates Name Provider Type Discipline    MS 12/01/15 -  Nikanne Goel PT Physical Therapist PT                PT Recommendation and Plan  Anticipated Discharge Disposition (PT): home with home health care  Planned Therapy Interventions (PT Eval): balance training, bed mobility training, gait training, home exercise program, patient/family education, postural re-education, stair training, strengthening, transfer training  Therapy Frequency (PT Clinical Impression): 2 times/day  Outcome Summary/Treatment Plan (PT)  Anticipated Equipment Needs at Discharge (PT): front wheeled walker  Anticipated Discharge Disposition (PT): home with home health care  Plan of Care Reviewed With: patient  Outcome Summary: Pt. will benefit from skilled inpt. P.T. to address her functional deficits and to assist pt. in regaining her independence with functional mobility.          Outcome  Measures     Row Name 04/03/18 1100             How much help from another person do you currently need...    Turning from your back to your side while in flat bed without using bedrails? 3  -MS      Moving from lying on back to sitting on the side of a flat bed without bedrails? 3  -MS      Moving to and from a bed to a chair (including a wheelchair)? 3  -MS      Standing up from a chair using your arms (e.g., wheelchair, bedside chair)? 3  -MS      Climbing 3-5 steps with a railing? 3  -MS      To walk in hospital room? 3  -MS      AM-PAC 6 Clicks Score 18  -MS         Functional Assessment    Outcome Measure Options AM-PAC 6 Clicks Basic Mobility (PT)  -MS        User Key  (r) = Recorded By, (t) = Taken By, (c) = Cosigned By    Initials Name Provider Type    MS Nik Goel PT Physical Therapist           Time Calculation:         PT Charges     Row Name 04/03/18 1130             Time Calculation    Start Time 1057  -MS      Stop Time 1112  -MS      Time Calculation (min) 15 min  -MS      PT Received On 04/03/18  -MS      PT - Next Appointment 04/03/18  -MS      PT Goal Re-Cert Due Date 04/06/18  -MS        User Key  (r) = Recorded By, (t) = Taken By, (c) = Cosigned By    Initials Name Provider Type    MS Nik Goel PT Physical Therapist          Therapy Charges for Today     Code Description Service Date Service Provider Modifiers Qty    75206586993 HC PT EVAL LOW COMPLEXITY 1 4/3/2018 Nik Goel, PT GP 1    82852133217 HC PT THER PROC EA 15 MIN 4/3/2018 Nik Goel PT GP 1    52763695692 HC PT THER SUPP EA 15 MIN 4/3/2018 Nik Goel, PT GP 1          PT G-Codes  Outcome Measure Options: AM-PAC 6 Clicks Basic Mobility (PT)      Nik Goel PT  4/3/2018

## 2018-04-03 NOTE — PROGRESS NOTES
AVSS.  Complains of back pain, not unexpected.  Leg pain is better.  Moves feet well.  Hemoglobin 12.2  Plan PT, mobilize.  Possibly home in a.m.

## 2018-04-03 NOTE — PLAN OF CARE
Problem: Patient Care Overview  Goal: Plan of Care Review   04/03/18 1129   Coping/Psychosocial   Plan of Care Reviewed With patient   OTHER   Outcome Summary Pt. will benefit from skilled inpt. P.T. to address her functional deficits and to assist pt. in regaining her independence with functional mobility.

## 2018-04-04 ENCOUNTER — TELEPHONE (OUTPATIENT)
Dept: ONCOLOGY | Facility: CLINIC | Age: 38
End: 2018-04-04

## 2018-04-04 VITALS
WEIGHT: 217.25 LBS | HEIGHT: 64 IN | TEMPERATURE: 98.5 F | HEART RATE: 93 BPM | BODY MASS INDEX: 37.09 KG/M2 | SYSTOLIC BLOOD PRESSURE: 120 MMHG | OXYGEN SATURATION: 97 % | RESPIRATION RATE: 16 BRPM | DIASTOLIC BLOOD PRESSURE: 78 MMHG

## 2018-04-04 LAB
BASOPHILS # BLD AUTO: 0.03 10*3/MM3 (ref 0–0.2)
BASOPHILS NFR BLD AUTO: 0.2 % (ref 0–1.5)
DEPRECATED RDW RBC AUTO: 47.3 FL (ref 37–54)
EOSINOPHIL # BLD AUTO: 0.09 10*3/MM3 (ref 0–0.7)
EOSINOPHIL NFR BLD AUTO: 0.6 % (ref 0.3–6.2)
ERYTHROCYTE [DISTWIDTH] IN BLOOD BY AUTOMATED COUNT: 12.9 % (ref 11.7–13)
HCT VFR BLD AUTO: 35.5 % (ref 35.6–45.5)
HGB BLD-MCNC: 11 G/DL (ref 11.9–15.5)
IMM GRANULOCYTES # BLD: 0.06 10*3/MM3 (ref 0–0.03)
IMM GRANULOCYTES NFR BLD: 0.4 % (ref 0–0.5)
LYMPHOCYTES # BLD AUTO: 3.99 10*3/MM3 (ref 0.9–4.8)
LYMPHOCYTES NFR BLD AUTO: 25.8 % (ref 19.6–45.3)
MCH RBC QN AUTO: 31.3 PG (ref 26.9–32)
MCHC RBC AUTO-ENTMCNC: 31 G/DL (ref 32.4–36.3)
MCV RBC AUTO: 100.9 FL (ref 80.5–98.2)
MONOCYTES # BLD AUTO: 1.2 10*3/MM3 (ref 0.2–1.2)
MONOCYTES NFR BLD AUTO: 7.8 % (ref 5–12)
NEUTROPHILS # BLD AUTO: 10.07 10*3/MM3 (ref 1.9–8.1)
NEUTROPHILS NFR BLD AUTO: 65.2 % (ref 42.7–76)
PLATELET # BLD AUTO: 293 10*3/MM3 (ref 140–500)
PMV BLD AUTO: 9.5 FL (ref 6–12)
RBC # BLD AUTO: 3.52 10*6/MM3 (ref 3.9–5.2)
REF LAB TEST METHOD: NORMAL
WBC NRBC COR # BLD: 15.44 10*3/MM3 (ref 4.5–10.7)

## 2018-04-04 PROCEDURE — 99024 POSTOP FOLLOW-UP VISIT: CPT | Performed by: ORTHOPAEDIC SURGERY

## 2018-04-04 PROCEDURE — 85025 COMPLETE CBC W/AUTO DIFF WBC: CPT | Performed by: INTERNAL MEDICINE

## 2018-04-04 RX ORDER — SENNA AND DOCUSATE SODIUM 50; 8.6 MG/1; MG/1
1 TABLET, FILM COATED ORAL 2 TIMES DAILY
Qty: 60 TABLET | Refills: 0 | Status: SHIPPED | OUTPATIENT
Start: 2018-04-04

## 2018-04-04 RX ORDER — OXYCODONE HYDROCHLORIDE AND ACETAMINOPHEN 5; 325 MG/1; MG/1
TABLET ORAL
Qty: 60 TABLET | Refills: 0
Start: 2018-04-04 | End: 2018-04-11 | Stop reason: SDUPTHER

## 2018-04-04 RX ADMIN — OXYCODONE HYDROCHLORIDE AND ACETAMINOPHEN 2 TABLET: 5; 325 TABLET ORAL at 13:53

## 2018-04-04 RX ADMIN — OXYCODONE HYDROCHLORIDE AND ACETAMINOPHEN 2 TABLET: 5; 325 TABLET ORAL at 06:06

## 2018-04-04 RX ADMIN — OXYCODONE HYDROCHLORIDE AND ACETAMINOPHEN 2 TABLET: 5; 325 TABLET ORAL at 01:32

## 2018-04-04 RX ADMIN — POLYETHYLENE GLYCOL 3350 17 G: 17 POWDER, FOR SOLUTION ORAL at 07:55

## 2018-04-04 RX ADMIN — Medication: at 05:24

## 2018-04-04 RX ADMIN — BISACODYL 10 MG: 10 SUPPOSITORY RECTAL at 07:55

## 2018-04-04 RX ADMIN — PANTOPRAZOLE SODIUM 40 MG: 40 TABLET, DELAYED RELEASE ORAL at 06:06

## 2018-04-04 RX ADMIN — DOCUSATE SODIUM -SENNOSIDES 1 TABLET: 50; 8.6 TABLET, COATED ORAL at 07:56

## 2018-04-04 RX ADMIN — OXYCODONE HYDROCHLORIDE AND ACETAMINOPHEN 2 TABLET: 5; 325 TABLET ORAL at 10:12

## 2018-04-04 NOTE — PROGRESS NOTES
Discharge Planning Assessment  [facility]     Patient Name: Terra Deutsch  MRN: [id number]  Today's Date: 4/4/2018    Admit Date: 4/2/2018          Discharge Needs Assessment     Row Name 04/04/18 1037       Living Environment    Lives With child(marilee), dependent;spouse    Name(s) of Who Lives With Patient Torsten Deutsch (spouse), dependent children (ages 14 and 17 y/o)    Primary Care Provided by self    Provides Primary Care For child(marilee)    Family Caregiver if Needed spouse    Quality of Family Relationships helpful;involved;supportive    Able to Return to Prior Arrangements yes       Resource/Environmental Concerns    Resource/Environmental Concerns none    Transportation Concerns car, none       Transition Planning    Patient/Family Anticipates Transition to home with family    Patient/Family Anticipated Services at Transition none    Transportation Anticipated family or friend will provide       Discharge Needs Assessment    Concerns to be Addressed discharge planning    Equipment Currently Used at Home none    Equipment Needed After Discharge walker, rolling    Discharge Coordination/Progress Home with family            Discharge Plan     Row Name 04/04/18 1043       Plan    Plan Home with family    Patient/Family in Agreement with Plan yes    Plan Comments CCP met with pt to discuss d/c planning. Facesheet verified. CCP role explained. Pt resides with her  (Torsten Deutsch, [phone]) and dependent children (ages 14 and 17 y/o) in a single level home with two exterior steps. Pt denies DME use but has raised commode at home, and reports no past home health or sub-acute rehab. Pt confirms pharmacy is [facility] in [place], [place]. PT ordered walker for pt to be supplied prior to d/c. Pt identifies no additional known needs. CCP to follow to assist should d/c needs arise. Karrie Mena LCSW        Destination     No service coordination in this encounter.      Durable Medical Equipment     No service  coordination in this encounter.      Dialysis/Infusion     No service coordination in this encounter.      Home Medical Care     No service coordination in this encounter.      Social Care     No service coordination in this encounter.                Demographic Summary     Row Name 04/04/18 1036       General Information    Admission Type inpatient    Arrived From home    Referral Source nursing;physician    Reason for Consult discharge planning    Preferred Language English            Functional Status     Row Name 04/04/18 1037       Functional Status    Usual Activity Tolerance good    Current Activity Tolerance moderate       Functional Status, IADL    Medications independent    Meal Preparation independent    Housekeeping independent    Laundry independent    Shopping independent       Mental Status Summary    Recent Changes in Mental Status/Cognitive Functioning no changes            Psychosocial    No documentation.           Abuse/Neglect    No documentation.           Legal    No documentation.           Substance Abuse    No documentation.           Patient Forms    No documentation.         Lara Mena LCSW

## 2018-04-04 NOTE — PROGRESS NOTES
Subjective     REASON FOR follow up  Chronic leukocytosis  Provide an opinion on any further workup or treatment                             REQUESTING PHYSICIAN:  Dr. Venkatesh Enrique    RECORDS OBTAINED:  Records of the patients history including those obtained from the referring provider were reviewed and summarized in detail.    HISTORY OF PRESENT ILLNESS:  The patient is a 37 y.o. year old female who is here for an opinion about the above issue.    History of Present Illness patient is a 37-year-old female with chronic leukocytosis since last year.  She does smoke and that's possible cause for this.  She is status postsurgery on her spine at L5-S1 and now it leukocytosis.  In the past she has gone as high as 22,000 and today she is 26,000 not 2 different.  She does not side show any signs of infection.    We have obtained flow, Jaspreet 2, BCR C Conner but the results are pending.  ESR is 18 normal, C-reactive protein is mildly elevated at 1.21.  She is afebrile.  Her blood pressure is normal.  Her heart rate is mildly elevated at 109.  She does not show signs of infection.  There likely going to discharge tomorrow.    Interval history: 4/4/18: Patient's white count is dropped down to 15.44 today.  Her platelet count is normal at 293 and her hemoglobin is 11.  It is okay to discharge her and we will follow her leukocytosis in the office as all collapse that we ordered a pending.  Flow cytometry is negative for any lymphoma or leukemia.      Hematologic history:    patient is a 37-year-old female who has been followed by orthopedics Dr. Valero and was admitted with low back pain radiating to the left buttock and lower extremity foot.  The back pain was thought to be very severe.  Anti-inflammatory have helped somewhat.  She also has had intramuscular steroid injections before.  Physical therapy also has been done in the past.     Per orthopedic she had some slight weakness of dorsiflexion of the left.  And had a  positive straight leg raising test.  Lumbar MRI showed disc protrusion at the left side of L5-S1.  The remainder of the spine looked good.  She was thought to be a patient followed lumbosacral discectomy.  She underwent surgery today that she had L5-S1 laminectomy and left discectomy with bilateral posterior lateral fusion.  We were asked to evaluate her chronic leukocytosis.     On admission her hemoglobin was 15.1 hematocrit 45.5, white count of 14.04 and a platelet count of 369.  As of today her hemoglobin is 14.4 hematocrit is 43.3, white count is 17.54 with a platelet of 365.  She is 75% neutrophils 18.7% lymphocytes 4.6% monocytes.     Looking back in May 2017 her white count was 13.7.     Patient had undergone hysterectomy with bilateral file polyp in.  It showed indeterminate.  Most necrotic suggestive prior ablation changes.  Mild Chronic endocervicitis.  Left ovary showed cystic involution.     We have been asked to evaluate chronic leukocytosis..    Past Medical History:   Diagnosis Date   • Abdominal pain     LEFT SIDE   • Anxiety    • Asthma    • COPD (chronic obstructive pulmonary disease)    • COPD (chronic obstructive pulmonary disease)     early signs   • GERD (gastroesophageal reflux disease)    • History of high blood pressure     NO MEDS SINCE 7/2017   • History of transfusion     POST TUBALIGATION   • Lower back pain    • Lumbar herniated disc    • Numbness     LEFT LEG   • Panic attack    • Sleep apnea     NO C-PAP IN USE   • Ulcer 2017    ENDOSCOPY        Past Surgical History:   Procedure Laterality Date   • COLONOSCOPY N/A 7/12/2017    - Internal hemorrhoids. Anal papilla(e) were hypertrophied One 4 mm polyp in the descending colon,One 4 mm polyp in the rectum, removed with a cold biopsy forceps. Resected and retrieved removed with a cold biopsy forceps.   • D&C CERVICAL BIOPSY     • ENDOSCOPY N/A 7/12/2017    Procedure: ESOPHAGOGASTRODUODENOSCOPY with bx;  Surgeon: Cherrie Lazaro MD;   Location: [facility] ENDOSCOPY;  Service:    • LUMBAR DISCECTOMY FUSION INSTRUMENTATION Left 4/2/2018    Procedure: L5-S1 discectomy and fusion with instrumentation;  Surgeon: Venkatesh Valero MD;  Location: [facility] MAIN OR;  Service: Neurosurgery   • TOTAL LAPAROSCOPIC HYSTERECTOMY Bilateral 7/24/2017    Procedure: TOTAL LAPAROSCOPIC HYSTERECTOMY WITH DAVINCI ROBOT MATTI SALPINGECTOMY LEFT OOPHORECTOMY;  Surgeon: Julianna Saenz MD;  Location: [facility] MAIN OR;  Service:    • TUBAL ABDOMINAL LIGATION          No current facility-administered medications on file prior to encounter.      Current Outpatient Prescriptions on File Prior to Encounter   Medication Sig Dispense Refill   • busPIRone (BUSPAR) 15 MG tablet Take 15 mg by mouth 3 (Three) Times a Day.     • cetirizine (zyrTEC) 10 MG tablet Take 10 mg by mouth Daily.     • cholecalciferol (VITAMIN D3) 1000 UNITS tablet Take 2,000 Units by mouth Daily. HOLD PRIOR TO SURG     • cyclobenzaprine (FLEXERIL) 10 MG tablet Take 10 mg by mouth 3 (Three) Times a Day As Needed.     • LORazepam (ATIVAN) 0.5 MG tablet Take 0.5 mg by mouth Every 8 (Eight) Hours As Needed for Anxiety (1-2 tabs TID).     • omeprazole (priLOSEC) 40 MG capsule Take 1 capsule by mouth Every Morning Before Breakfast. 30 capsule 2   • PROAIR  (90 BASE) MCG/ACT inhaler Inhale 1 puff Every 4 (Four) Hours As Needed.          ALLERGIES:    Allergies   Allergen Reactions   • Pyridium [Phenazopyridine Hcl]      UNKNOWN   • Bactrim [Sulfamethoxazole-Trimethoprim] Palpitations   • Penicillins Palpitations        Social History     Social History   • Marital status:      Social History Main Topics   • Smoking status: Former Smoker     Packs/day: 1.00     Years: 14.00     Types: Cigarettes     Start date: 2000     Quit date: 3/27/2018   • Smokeless tobacco: Never Used   • Alcohol use No   • Drug use: No   • Sexual activity: Defer     Other Topics Concern   • Not on file        Family History   Problem  Relation Age of Onset   • Heart disease Mother    • Hypertension Mother    • Heart disease Sister    • Malig Hyperthermia Neg Hx         Review of Systems patient is not in so much pain today.  She denies chest pain shortness of breath or cough.  No other signs of infection.  She has no nausea vomiting or abdominal pain.  She does have some pain at the surgical site.    Objective     Vitals:    04/03/18 1909 04/03/18 2357 04/04/18 0322 04/04/18 0700   BP: 119/79 123/78 111/72 129/82   BP Location: Right arm Right arm Right arm Right arm   Patient Position: Lying Lying Lying Lying   Pulse: 100 97 81 98   Resp: 16 16 16 16   Temp: 97.4 °F (36.3 °C) 98.4 °F (36.9 °C) 98.5 °F (36.9 °C) 98.1 °F (36.7 °C)   TempSrc: Oral Oral Oral Oral   SpO2: 91% 95% 98% 98%   Weight:       Height:         No flowsheet data found.    Physical Exam    GENERAL:  Well-developed, well-nourished in no acute distress.   CHEST:  Lungs clear to auscultation. Good airflow.  CARDIAC:  Regular rate and rhythm without murmurs, rubs or gallops. Normal S1,S2.  ABDOMEN:  Soft, nontender with no hepatosplenomegaly or masses.  EXTREMITIES:  No clubbing, cyanosis or edema.  NEUROLOGICAL:  Cranial Nerves II-XII grossly intact.  No focal neurological deficits.  PSYCHIATRIC:  Normal affect and mood.    Status post spine surgery L5-S1 laminectomy    RECENT LABS:  Hematology WBC   Date Value Ref Range Status   04/04/2018 15.44 (H) 4.50 - 10.70 10*3/mm3 Final     RBC   Date Value Ref Range Status   04/04/2018 3.52 (L) 3.90 - 5.20 10*6/mm3 Final     Hemoglobin   Date Value Ref Range Status   04/04/2018 11.0 (L) 11.9 - 15.5 g/dL Final     Hematocrit   Date Value Ref Range Status   04/04/2018 35.5 (L) 35.6 - 45.5 % Final     Platelets   Date Value Ref Range Status   04/04/2018 293 140 - 500 10*3/mm3 Final          Assessment/Plan     1.  Status post L5 to S1 laminectomy and left discectomy this a.m.  Patient has pain related to surgery and is on a pain  pump.     2.  Chronic leukocytosis.  Patient states that she is a smoker and quit smoking before her surgery this time.  She does get bronchitis on and off.  Looking back at her lab her blood counts her white count has been anywhere in the range of 14,000 to 22,000.  She has a normal differential count.  She is afebrile.  She does not have any chills.  Currently she denies any bronchitis.  Given the chronic nature of her leukocytosis we will repeat check a flow cytometry, RICKY 2 mutation and BCR C Conner to rule out myeloproliferative disorders.    Ester Stevens MD    This note was prepared prior to seeing patient but pt got discharged before I could see patient . Will not charge patient.  Ester Stevens MD

## 2018-04-04 NOTE — PAYOR COMM NOTE
"Terra Sanches (37 y.o. Female)     Date of Birth Social Security Number Address Home Phone MRN    1980  [address]  [facility] [address] [phone] [id number]    Scientologist Marital Status          Yarsanism        Admission Date Admission Type Admitting Provider Attending Provider Department, Room/Bed    4/2/18 Elective Venkatesh Valero MD  [address], P884/1    Discharge Date Discharge Disposition Discharge Destination        4/4/2018 Home or Self Care              Attending Provider:  (none)   Allergies:  Pyridium [Phenazopyridine Hcl], Bactrim [Sulfamethoxazole-trimethoprim], Penicillins    Isolation:  None   Infection:  None   Code Status:  FULL    Ht:  162.6 cm (64.02\")   Wt:  98.5 kg (217 lb 4 oz)    Admission Cmt:  None   Principal Problem:  None                Active Insurance as of 4/2/2018     Primary Coverage     Payor Plan Insurance Group Employer/Plan Group    [employer] [id number]     Payor Plan Address Payor Plan Phone Number Effective From Effective To    [address] [phone] 8/20/2016     [place] [address]       Subscriber Name Subscriber Birth Date Member ID       JAYLIN SANCHES [birth year]1972 [id number]                 Emergency Contacts      (Rel.) Home Phone Work Phone Mobile Phone    Jaylin Sanches (Spouse) -- -- [phone]               Discharge Summary      Venkatesh Valero MD at 4/4/2018 11:08 AM           Orthopedic Discharge Summary      Patient: Terra Sanches  YOB: 1980  Medical Record Number: [id number]    Attending Physician: Venkatesh Valero MD  Consulting Physician(s):   Consults     Date and Time Order Name Status Description    4/2/2018 1233 Hematology & Oncology Inpatient Consult            Date of Admission: 4/2/2018  5:40 AM  Date of Discharge:4/4/2018    Discharge Diagnosis: L5-S1 discectomy and fusion with instrumentation,   Acute Blood Loss Anemia  Chronic " Leukocytosis    Presenting Problem/History of Present Illness: Herniated lumbar intervertebral disc [M51.26]  Spondylolisthesis of lumbar region [M43.16]  Spinal stenosis of lumbar region, unspecified whether neurogenic claudication present [M48.061]        Allergies:   Allergies   Allergen Reactions   • Pyridium [Phenazopyridine Hcl]      UNKNOWN   • Bactrim [Sulfamethoxazole-Trimethoprim] Palpitations   • Penicillins Palpitations       Discharge Medications   GetTerra SUSHANT   Home Medication Instructions EDDIE:[id number]    Printed on:04/04/18 0198   Medication Information                      busPIRone (BUSPAR) 15 MG tablet  Take 15 mg by mouth 3 (Three) Times a Day.             cetirizine (zyrTEC) 10 MG tablet  Take 10 mg by mouth Daily.             cholecalciferol (VITAMIN D3) 1000 UNITS tablet  Take 2,000 Units by mouth Daily. HOLD PRIOR TO SURG             cyclobenzaprine (FLEXERIL) 10 MG tablet  Take 10 mg by mouth 3 (Three) Times a Day As Needed.             LORazepam (ATIVAN) 0.5 MG tablet  Take 0.5 mg by mouth Every 8 (Eight) Hours As Needed for Anxiety (1-2 tabs TID).             omeprazole (priLOSEC) 40 MG capsule  Take 1 capsule by mouth Every Morning Before Breakfast.             oxyCODONE-acetaminophen (PERCOCET) 5-325 MG per tablet  Take 1-2 tabs po q 4 hours prn pain             polyethylene glycol (MIRALAX) pack packet  Take 17 g by mouth Daily.             PROAIR  (90 BASE) MCG/ACT inhaler  Inhale 1 puff Every 4 (Four) Hours As Needed.             sennosides-docusate sodium (SENOKOT-S) 8.6-50 MG tablet  Take 1 tablet by mouth 2 (Two) Times a Day.                   Past Medical History:   Diagnosis Date   • Abdominal pain     LEFT SIDE   • Anxiety    • Asthma    • COPD (chronic obstructive pulmonary disease)    • COPD (chronic obstructive pulmonary disease)     early signs   • GERD (gastroesophageal reflux disease)    • History of high blood pressure     NO MEDS SINCE 7/2017   • History of  transfusion     POST TUBALIGATION   • Lower back pain    • Lumbar herniated disc    • Numbness     LEFT LEG   • Panic attack    • Sleep apnea     NO C-PAP IN USE   • Ulcer 2017    ENDOSCOPY        Past Surgical History:   Procedure Laterality Date   • COLONOSCOPY N/A 7/12/2017    - Internal hemorrhoids. Anal papilla(e) were hypertrophied One 4 mm polyp in the descending colon,One 4 mm polyp in the rectum, removed with a cold biopsy forceps. Resected and retrieved removed with a cold biopsy forceps.   • D&C CERVICAL BIOPSY     • ENDOSCOPY N/A 7/12/2017    Procedure: ESOPHAGOGASTRODUODENOSCOPY with bx;  Surgeon: Cherrie Lazaro MD;  Location: [facility] ENDOSCOPY;  Service:    • LUMBAR DISCECTOMY FUSION INSTRUMENTATION Left 4/2/2018    Procedure: L5-S1 discectomy and fusion with instrumentation;  Surgeon: Venkatesh Valero MD;  Location: [facility] OR;  Service: Neurosurgery   • TOTAL LAPAROSCOPIC HYSTERECTOMY Bilateral 7/24/2017    Procedure: TOTAL LAPAROSCOPIC HYSTERECTOMY WITH DAVINCI ROBOT MATTI SALPINGECTOMY LEFT OOPHORECTOMY;  Surgeon: Julianna Saenz MD;  Location: [facility] OR;  Service:    • TUBAL ABDOMINAL LIGATION          Social History     Occupational History   • Not on file.     Social History Main Topics   • Smoking status: Former Smoker     Packs/day: 1.00     Years: 14.00     Types: Cigarettes     Start date: 2000     Quit date: 3/27/2018   • Smokeless tobacco: Never Used   • Alcohol use No   • Drug use: No   • Sexual activity: Defer    Social History     Social History Narrative   • No narrative on file        Family History   Problem Relation Age of Onset   • Heart disease Mother    • Hypertension Mother    • Heart disease Sister    • Malig Hyperthermia Neg Hx          Physical Exam: 37 y.o. female  General Appearance:    Alert, cooperative, in no acute distress                    Vitals:    04/03/18 1909 04/03/18 2357 04/04/18 0322 04/04/18 0700   BP: 119/79 123/78 111/72 129/82   BP Location:  Right arm Right arm Right arm Right arm   Patient Position: Lying Lying Lying Lying   Pulse: 100 97 81 98   Resp: 16 16 16 16   Temp: 97.4 °F (36.3 °C) 98.4 °F (36.9 °C) 98.5 °F (36.9 °C) 98.1 °F (36.7 °C)   TempSrc: Oral Oral Oral Oral   SpO2: 91% 95% 98% 98%   Weight:       Height:            DIAGNOSTIC TESTS:   Admission on 04/02/2018   Component Date Value Ref Range Status   • WBC 04/02/2018 17.54* 4.50 - 10.70 10*3/mm3 Final   • RBC 04/02/2018 4.53  3.90 - 5.20 10*6/mm3 Final   • Hemoglobin 04/02/2018 14.4  11.9 - 15.5 g/dL Final   • Hematocrit 04/02/2018 43.3  35.6 - 45.5 % Final   • MCV 04/02/2018 95.6  80.5 - 98.2 fL Final   • MCH 04/02/2018 31.8  26.9 - 32.0 pg Final   • MCHC 04/02/2018 33.3  32.4 - 36.3 g/dL Final   • RDW 04/02/2018 12.7  11.7 - 13.0 % Final   • RDW-SD 04/02/2018 44.0  37.0 - 54.0 fl Final   • MPV 04/02/2018 9.6  6.0 - 12.0 fL Final   • Platelets 04/02/2018 365  140 - 500 10*3/mm3 Final   • Neutrophil % 04/02/2018 75.4  42.7 - 76.0 % Final   • Lymphocyte % 04/02/2018 18.7* 19.6 - 45.3 % Final   • Monocyte % 04/02/2018 4.6* 5.0 - 12.0 % Final   • Eosinophil % 04/02/2018 0.8  0.3 - 6.2 % Final   • Basophil % 04/02/2018 0.3  0.0 - 1.5 % Final   • Immature Grans % 04/02/2018 0.2  0.0 - 0.5 % Final   • Neutrophils, Absolute 04/02/2018 13.23* 1.90 - 8.10 10*3/mm3 Final   • Lymphocytes, Absolute 04/02/2018 3.28  0.90 - 4.80 10*3/mm3 Final   • Monocytes, Absolute 04/02/2018 0.80  0.20 - 1.20 10*3/mm3 Final   • Eosinophils, Absolute 04/02/2018 0.14  0.00 - 0.70 10*3/mm3 Final   • Basophils, Absolute 04/02/2018 0.05  0.00 - 0.20 10*3/mm3 Final   • Immature Grans, Absolute 04/02/2018 0.04* 0.00 - 0.03 10*3/mm3 Final   • Hemoglobin 04/02/2018 13.3  11.9 - 15.5 g/dL Final   • Hematocrit 04/02/2018 42.1  35.6 - 45.5 % Final   • C-Reactive Protein 04/02/2018 1.21* 0.00 - 0.50 mg/dL Final   • Sed Rate 04/02/2018 18  0 - 20 mm/hr Final   • Glucose 04/03/2018 129* 65 - 99 mg/dL Final   • BUN 04/03/2018 5* 6  - 20 mg/dL Final   • Creatinine 04/03/2018 0.60  0.57 - 1.00 mg/dL Final   • Sodium 04/03/2018 140  136 - 145 mmol/L Final   • Potassium 04/03/2018 4.1  3.5 - 5.2 mmol/L Final   • Chloride 04/03/2018 101  98 - 107 mmol/L Final   • CO2 04/03/2018 26.2  22.0 - 29.0 mmol/L Final   • Calcium 04/03/2018 8.8  8.6 - 10.5 mg/dL Final   • Total Protein 04/03/2018 6.2  6.0 - 8.5 g/dL Final   • Albumin 04/03/2018 3.40* 3.50 - 5.20 g/dL Final   • ALT (SGPT) 04/03/2018 18  1 - 33 U/L Final   • AST (SGOT) 04/03/2018 29  1 - 32 U/L Final   • Alkaline Phosphatase 04/03/2018 60  39 - 117 U/L Final   • Total Bilirubin 04/03/2018 0.2  0.1 - 1.2 mg/dL Final   • eGFR Non African Amer 04/03/2018 111  >60 mL/min/1.73 Final   • Globulin 04/03/2018 2.8  gm/dL Final   • A/G Ratio 04/03/2018 1.2  g/dL Final   • BUN/Creatinine Ratio 04/03/2018 8.3  7.0 - 25.0 Final   • Anion Gap 04/03/2018 12.8  mmol/L Final   • WBC 04/03/2018 26.67* 4.50 - 10.70 10*3/mm3 Final   • RBC 04/03/2018 3.91  3.90 - 5.20 10*6/mm3 Final   • Hemoglobin 04/03/2018 12.2  11.9 - 15.5 g/dL Final   • Hematocrit 04/03/2018 39.1  35.6 - 45.5 % Final   • MCV 04/03/2018 100.0* 80.5 - 98.2 fL Final   • MCH 04/03/2018 31.2  26.9 - 32.0 pg Final   • MCHC 04/03/2018 31.2* 32.4 - 36.3 g/dL Final   • RDW 04/03/2018 12.9  11.7 - 13.0 % Final   • RDW-SD 04/03/2018 47.2  37.0 - 54.0 fl Final   • MPV 04/03/2018 9.5  6.0 - 12.0 fL Final   • Platelets 04/03/2018 314  140 - 500 10*3/mm3 Final   • Neutrophil % 04/03/2018 78.0* 42.7 - 76.0 % Final   • Lymphocyte % 04/03/2018 15.0* 19.6 - 45.3 % Final   • Monocyte % 04/03/2018 6.0  5.0 - 12.0 % Final   • Atypical Lymphocyte % 04/03/2018 1.0  0.0 - 5.0 % Final   • Neutrophils Absolute 04/03/2018 20.80* 1.90 - 8.10 10*3/mm3 Final   • Lymphocytes Absolute 04/03/2018 4.00  0.90 - 4.80 10*3/mm3 Final   • Monocytes Absolute 04/03/2018 1.60* 0.20 - 1.20 10*3/mm3 Final   • Stomatocytes 04/03/2018 Slight/1+  None Seen Final   • WBC Morphology  04/03/2018 Normal  Normal Final   • Platelet Morphology 04/03/2018 Normal  Normal Final   • WBC 04/04/2018 15.44* 4.50 - 10.70 10*3/mm3 Final   • RBC 04/04/2018 3.52* 3.90 - 5.20 10*6/mm3 Final   • Hemoglobin 04/04/2018 11.0* 11.9 - 15.5 g/dL Final   • Hematocrit 04/04/2018 35.5* 35.6 - 45.5 % Final   • MCV 04/04/2018 100.9* 80.5 - 98.2 fL Final   • MCH 04/04/2018 31.3  26.9 - 32.0 pg Final   • MCHC 04/04/2018 31.0* 32.4 - 36.3 g/dL Final   • RDW 04/04/2018 12.9  11.7 - 13.0 % Final   • RDW-SD 04/04/2018 47.3  37.0 - 54.0 fl Final   • MPV 04/04/2018 9.5  6.0 - 12.0 fL Final   • Platelets 04/04/2018 293  140 - 500 10*3/mm3 Final   • Neutrophil % 04/04/2018 65.2  42.7 - 76.0 % Final   • Lymphocyte % 04/04/2018 25.8  19.6 - 45.3 % Final   • Monocyte % 04/04/2018 7.8  5.0 - 12.0 % Final   • Eosinophil % 04/04/2018 0.6  0.3 - 6.2 % Final   • Basophil % 04/04/2018 0.2  0.0 - 1.5 % Final   • Immature Grans % 04/04/2018 0.4  0.0 - 0.5 % Final   • Neutrophils, Absolute 04/04/2018 10.07* 1.90 - 8.10 10*3/mm3 Final   • Lymphocytes, Absolute 04/04/2018 3.99  0.90 - 4.80 10*3/mm3 Final   • Monocytes, Absolute 04/04/2018 1.20  0.20 - 1.20 10*3/mm3 Final   • Eosinophils, Absolute 04/04/2018 0.09  0.00 - 0.70 10*3/mm3 Final   • Basophils, Absolute 04/04/2018 0.03  0.00 - 0.20 10*3/mm3 Final   • Immature Grans, Absolute 04/04/2018 0.06* 0.00 - 0.03 10*3/mm3 Final       No results found.    Hospital Course:  37 y.o. female admitted to [facility] to services of Venkatesh Valero MD with Herniated lumbar intervertebral disc [M51.26]  Spondylolisthesis of lumbar region [M43.16]  Spinal stenosis of lumbar region, unspecified whether neurogenic claudication present [M48.061] on 4/2/2018 and underwent L5-S1 discectomy and fusion with instrumentation  Per Venkatesh Valero MD. Antibiotic and VTE prophylaxis were per SCIP protocols.  The patient was admitted to the floor where IV and/or oral pain medications were administered for  postoperative pain.  At discharge the incisional pain was tolerable and preop neurologic function was intact.  The dressing was dry and the wound was clean.    Condition on Discharge:  Stable    Discharge Instructions: . Patient may weight bear as tolerated unless otherwise specified. Continue MACIEL hose daily (for two weeks) and ice regularly. Patient also instructed on incentive spirometer during hospitalization and encouraged to continue to use at home regularly. Patient may shower on POD #3 if and when all wound drainage has stopped.  Where applicable, the brace should be worn when up and about.  It need not be worn to the bathroom and certainly not in the shower.  A detailed list of instructions specific to the operation was given to the patient at the time of discharge.    Follow up Instructions:  Follow up in the office with Dr. Venkatesh Valero Jr. in 2-3 weeks - patient to call the office at [phone] to schedule. Prescriptions were given for pain medication.    Follow-up Appointments  Future Appointments  Date Time Provider Department Center   4/17/2018 11:20 AM MD RENE [facility] None   4/24/2018 10:30 AM LAB CHAIR 2 [facility]  LAB [facility]   4/24/2018 11:00 AM MD LANI Toribio [facility]     Additional Instructions for the Follow-ups that You Need to Schedule     Discharge Follow-up with Specialty: Orthopedics    As directed      Specialty:  Orthopedics    Follow Up Details:  Return to the office to see Dr. Venkatesh Valero               Discharge Disposition Plan:today to home    Date: 4/4/2018    Renetta Bell RN  04/04/18  11:08 AM    Venkatesh Valero MD      Electronically signed by Venkatesh Valero MD at 4/4/2018  1:52 PM

## 2018-04-04 NOTE — SIGNIFICANT NOTE
04/04/18 0923   Rehab Treatment   Discipline physical therapist   Reason Treatment Not Performed patient/family declined treatment  (Pt ambulating 400ft independently without AD in hallways. Pt practiced the stairs yesterday and felt comfortable navigating stairs at home. Pt is safe to d/c home when medically stable. PT will sign off. Encouraged to continue ambulating while admitted)

## 2018-04-04 NOTE — PLAN OF CARE
Problem: Patient Care Overview  Goal: Plan of Care Review  Outcome: Ongoing (interventions implemented as appropriate)   04/04/18 0541   Coping/Psychosocial   Plan of Care Reviewed With patient   Plan of Care Review   Progress improving   OTHER   Outcome Summary VSS. Neurovascular assessment WNL. Ambulated about mahajan with SBA. Dressing clean dry intact . Decreased usage of PCA this shift. Has requested PO analgesic X2 thus far. Wears brace when OOB. Verbalizes understanding of all educational topics that include maintaining recent smoking cessation. Plans for d/c home today       Problem: Pain, Acute (Adult)  Goal: Acceptable Pain Control/Comfort Level  Outcome: Ongoing (interventions implemented as appropriate)   04/04/18 0541   Pain, Acute (Adult)   Acceptable Pain Control/Comfort Level making progress toward outcome       Problem: Fall Risk (Adult)  Goal: Absence of Fall  Outcome: Ongoing (interventions implemented as appropriate)   04/04/18 0541   Fall Risk (Adult)   Absence of Fall achieves outcome

## 2018-04-04 NOTE — DISCHARGE SUMMARY
Orthopedic Discharge Summary      Patient: Terra Deutsch  YOB: 1980  Medical Record Number: [id number]    Attending Physician: Venkatesh Valero MD  Consulting Physician(s):   Consults     Date and Time Order Name Status Description    4/2/2018 1233 Hematology & Oncology Inpatient Consult            Date of Admission: 4/2/2018  5:40 AM  Date of Discharge:4/4/2018    Discharge Diagnosis: L5-S1 discectomy and fusion with instrumentation,   Acute Blood Loss Anemia  Chronic Leukocytosis    Presenting Problem/History of Present Illness: Herniated lumbar intervertebral disc [M51.26]  Spondylolisthesis of lumbar region [M43.16]  Spinal stenosis of lumbar region, unspecified whether neurogenic claudication present [M48.061]        Allergies:   Allergies   Allergen Reactions   • Pyridium [Phenazopyridine Hcl]      UNKNOWN   • Bactrim [Sulfamethoxazole-Trimethoprim] Palpitations   • Penicillins Palpitations       Discharge Medications   Terra Deutsch   Home Medication Instructions EDDIE:[id number]    Printed on:04/04/18 1103   Medication Information                      busPIRone (BUSPAR) 15 MG tablet  Take 15 mg by mouth 3 (Three) Times a Day.             cetirizine (zyrTEC) 10 MG tablet  Take 10 mg by mouth Daily.             cholecalciferol (VITAMIN D3) 1000 UNITS tablet  Take 2,000 Units by mouth Daily. HOLD PRIOR TO SURG             cyclobenzaprine (FLEXERIL) 10 MG tablet  Take 10 mg by mouth 3 (Three) Times a Day As Needed.             LORazepam (ATIVAN) 0.5 MG tablet  Take 0.5 mg by mouth Every 8 (Eight) Hours As Needed for Anxiety (1-2 tabs TID).             omeprazole (priLOSEC) 40 MG capsule  Take 1 capsule by mouth Every Morning Before Breakfast.             oxyCODONE-acetaminophen (PERCOCET) 5-325 MG per tablet  Take 1-2 tabs po q 4 hours prn pain             polyethylene glycol (MIRALAX) pack packet  Take 17 g by mouth Daily.             PROAIR  (90 BASE) MCG/ACT inhaler  Inhale 1 puff  Every 4 (Four) Hours As Needed.             sennosides-docusate sodium (SENOKOT-S) 8.6-50 MG tablet  Take 1 tablet by mouth 2 (Two) Times a Day.                   Past Medical History:   Diagnosis Date   • Abdominal pain     LEFT SIDE   • Anxiety    • Asthma    • COPD (chronic obstructive pulmonary disease)    • COPD (chronic obstructive pulmonary disease)     early signs   • GERD (gastroesophageal reflux disease)    • History of high blood pressure     NO MEDS SINCE 7/2017   • History of transfusion     POST TUBALIGATION   • Lower back pain    • Lumbar herniated disc    • Numbness     LEFT LEG   • Panic attack    • Sleep apnea     NO C-PAP IN USE   • Ulcer 2017    ENDOSCOPY        Past Surgical History:   Procedure Laterality Date   • COLONOSCOPY N/A 7/12/2017    - Internal hemorrhoids. Anal papilla(e) were hypertrophied One 4 mm polyp in the descending colon,One 4 mm polyp in the rectum, removed with a cold biopsy forceps. Resected and retrieved removed with a cold biopsy forceps.   • D&C CERVICAL BIOPSY     • ENDOSCOPY N/A 7/12/2017    Procedure: ESOPHAGOGASTRODUODENOSCOPY with bx;  Surgeon: Cherrie Lazaro MD;  Location: [facility] ENDOSCOPY;  Service:    • LUMBAR DISCECTOMY FUSION INSTRUMENTATION Left 4/2/2018    Procedure: L5-S1 discectomy and fusion with instrumentation;  Surgeon: Venkatesh Valero MD;  Location: [facility] OR;  Service: Neurosurgery   • TOTAL LAPAROSCOPIC HYSTERECTOMY Bilateral 7/24/2017    Procedure: TOTAL LAPAROSCOPIC HYSTERECTOMY WITH DAVINCI ROBOT MATTI SALPINGECTOMY LEFT OOPHORECTOMY;  Surgeon: Julianna Saenz MD;  Location: [facility] OR;  Service:    • TUBAL ABDOMINAL LIGATION          Social History     Occupational History   • Not on file.     Social History Main Topics   • Smoking status: Former Smoker     Packs/day: 1.00     Years: 14.00     Types: Cigarettes     Start date: 2000     Quit date: 3/27/2018   • Smokeless tobacco: Never Used   • Alcohol use No   • Drug use: No   •  Sexual activity: Defer    Social History     Social History Narrative   • No narrative on file        Family History   Problem Relation Age of Onset   • Heart disease Mother    • Hypertension Mother    • Heart disease Sister    • Malig Hyperthermia Neg Hx          Physical Exam: 37 y.o. female  General Appearance:    Alert, cooperative, in no acute distress                    Vitals:    04/03/18 1909 04/03/18 2357 04/04/18 0322 04/04/18 0700   BP: 119/79 123/78 111/72 129/82   BP Location: Right arm Right arm Right arm Right arm   Patient Position: Lying Lying Lying Lying   Pulse: 100 97 81 98   Resp: 16 16 16 16   Temp: 97.4 °F (36.3 °C) 98.4 °F (36.9 °C) 98.5 °F (36.9 °C) 98.1 °F (36.7 °C)   TempSrc: Oral Oral Oral Oral   SpO2: 91% 95% 98% 98%   Weight:       Height:            DIAGNOSTIC TESTS:   Admission on 04/02/2018   Component Date Value Ref Range Status   • WBC 04/02/2018 17.54* 4.50 - 10.70 10*3/mm3 Final   • RBC 04/02/2018 4.53  3.90 - 5.20 10*6/mm3 Final   • Hemoglobin 04/02/2018 14.4  11.9 - 15.5 g/dL Final   • Hematocrit 04/02/2018 43.3  35.6 - 45.5 % Final   • MCV 04/02/2018 95.6  80.5 - 98.2 fL Final   • MCH 04/02/2018 31.8  26.9 - 32.0 pg Final   • MCHC 04/02/2018 33.3  32.4 - 36.3 g/dL Final   • RDW 04/02/2018 12.7  11.7 - 13.0 % Final   • RDW-SD 04/02/2018 44.0  37.0 - 54.0 fl Final   • MPV 04/02/2018 9.6  6.0 - 12.0 fL Final   • Platelets 04/02/2018 365  140 - 500 10*3/mm3 Final   • Neutrophil % 04/02/2018 75.4  42.7 - 76.0 % Final   • Lymphocyte % 04/02/2018 18.7* 19.6 - 45.3 % Final   • Monocyte % 04/02/2018 4.6* 5.0 - 12.0 % Final   • Eosinophil % 04/02/2018 0.8  0.3 - 6.2 % Final   • Basophil % 04/02/2018 0.3  0.0 - 1.5 % Final   • Immature Grans % 04/02/2018 0.2  0.0 - 0.5 % Final   • Neutrophils, Absolute 04/02/2018 13.23* 1.90 - 8.10 10*3/mm3 Final   • Lymphocytes, Absolute 04/02/2018 3.28  0.90 - 4.80 10*3/mm3 Final   • Monocytes, Absolute 04/02/2018 0.80  0.20 - 1.20 10*3/mm3 Final   •  Eosinophils, Absolute 04/02/2018 0.14  0.00 - 0.70 10*3/mm3 Final   • Basophils, Absolute 04/02/2018 0.05  0.00 - 0.20 10*3/mm3 Final   • Immature Grans, Absolute 04/02/2018 0.04* 0.00 - 0.03 10*3/mm3 Final   • Hemoglobin 04/02/2018 13.3  11.9 - 15.5 g/dL Final   • Hematocrit 04/02/2018 42.1  35.6 - 45.5 % Final   • C-Reactive Protein 04/02/2018 1.21* 0.00 - 0.50 mg/dL Final   • Sed Rate 04/02/2018 18  0 - 20 mm/hr Final   • Glucose 04/03/2018 129* 65 - 99 mg/dL Final   • BUN 04/03/2018 5* 6 - 20 mg/dL Final   • Creatinine 04/03/2018 0.60  0.57 - 1.00 mg/dL Final   • Sodium 04/03/2018 140  136 - 145 mmol/L Final   • Potassium 04/03/2018 4.1  3.5 - 5.2 mmol/L Final   • Chloride 04/03/2018 101  98 - 107 mmol/L Final   • CO2 04/03/2018 26.2  22.0 - 29.0 mmol/L Final   • Calcium 04/03/2018 8.8  8.6 - 10.5 mg/dL Final   • Total Protein 04/03/2018 6.2  6.0 - 8.5 g/dL Final   • Albumin 04/03/2018 3.40* 3.50 - 5.20 g/dL Final   • ALT (SGPT) 04/03/2018 18  1 - 33 U/L Final   • AST (SGOT) 04/03/2018 29  1 - 32 U/L Final   • Alkaline Phosphatase 04/03/2018 60  39 - 117 U/L Final   • Total Bilirubin 04/03/2018 0.2  0.1 - 1.2 mg/dL Final   • eGFR Non African Amer 04/03/2018 111  >60 mL/min/1.73 Final   • Globulin 04/03/2018 2.8  gm/dL Final   • A/G Ratio 04/03/2018 1.2  g/dL Final   • BUN/Creatinine Ratio 04/03/2018 8.3  7.0 - 25.0 Final   • Anion Gap 04/03/2018 12.8  mmol/L Final   • WBC 04/03/2018 26.67* 4.50 - 10.70 10*3/mm3 Final   • RBC 04/03/2018 3.91  3.90 - 5.20 10*6/mm3 Final   • Hemoglobin 04/03/2018 12.2  11.9 - 15.5 g/dL Final   • Hematocrit 04/03/2018 39.1  35.6 - 45.5 % Final   • MCV 04/03/2018 100.0* 80.5 - 98.2 fL Final   • MCH 04/03/2018 31.2  26.9 - 32.0 pg Final   • MCHC 04/03/2018 31.2* 32.4 - 36.3 g/dL Final   • RDW 04/03/2018 12.9  11.7 - 13.0 % Final   • RDW-SD 04/03/2018 47.2  37.0 - 54.0 fl Final   • MPV 04/03/2018 9.5  6.0 - 12.0 fL Final   • Platelets 04/03/2018 314  140 - 500 10*3/mm3 Final   •  Neutrophil % 04/03/2018 78.0* 42.7 - 76.0 % Final   • Lymphocyte % 04/03/2018 15.0* 19.6 - 45.3 % Final   • Monocyte % 04/03/2018 6.0  5.0 - 12.0 % Final   • Atypical Lymphocyte % 04/03/2018 1.0  0.0 - 5.0 % Final   • Neutrophils Absolute 04/03/2018 20.80* 1.90 - 8.10 10*3/mm3 Final   • Lymphocytes Absolute 04/03/2018 4.00  0.90 - 4.80 10*3/mm3 Final   • Monocytes Absolute 04/03/2018 1.60* 0.20 - 1.20 10*3/mm3 Final   • Stomatocytes 04/03/2018 Slight/1+  None Seen Final   • WBC Morphology 04/03/2018 Normal  Normal Final   • Platelet Morphology 04/03/2018 Normal  Normal Final   • WBC 04/04/2018 15.44* 4.50 - 10.70 10*3/mm3 Final   • RBC 04/04/2018 3.52* 3.90 - 5.20 10*6/mm3 Final   • Hemoglobin 04/04/2018 11.0* 11.9 - 15.5 g/dL Final   • Hematocrit 04/04/2018 35.5* 35.6 - 45.5 % Final   • MCV 04/04/2018 100.9* 80.5 - 98.2 fL Final   • MCH 04/04/2018 31.3  26.9 - 32.0 pg Final   • MCHC 04/04/2018 31.0* 32.4 - 36.3 g/dL Final   • RDW 04/04/2018 12.9  11.7 - 13.0 % Final   • RDW-SD 04/04/2018 47.3  37.0 - 54.0 fl Final   • MPV 04/04/2018 9.5  6.0 - 12.0 fL Final   • Platelets 04/04/2018 293  140 - 500 10*3/mm3 Final   • Neutrophil % 04/04/2018 65.2  42.7 - 76.0 % Final   • Lymphocyte % 04/04/2018 25.8  19.6 - 45.3 % Final   • Monocyte % 04/04/2018 7.8  5.0 - 12.0 % Final   • Eosinophil % 04/04/2018 0.6  0.3 - 6.2 % Final   • Basophil % 04/04/2018 0.2  0.0 - 1.5 % Final   • Immature Grans % 04/04/2018 0.4  0.0 - 0.5 % Final   • Neutrophils, Absolute 04/04/2018 10.07* 1.90 - 8.10 10*3/mm3 Final   • Lymphocytes, Absolute 04/04/2018 3.99  0.90 - 4.80 10*3/mm3 Final   • Monocytes, Absolute 04/04/2018 1.20  0.20 - 1.20 10*3/mm3 Final   • Eosinophils, Absolute 04/04/2018 0.09  0.00 - 0.70 10*3/mm3 Final   • Basophils, Absolute 04/04/2018 0.03  0.00 - 0.20 10*3/mm3 Final   • Immature Grans, Absolute 04/04/2018 0.06* 0.00 - 0.03 10*3/mm3 Final       No results found.    Hospital Course:  37 y.o. female admitted to [facility] [place]  [facility] to services of Venkatesh Valero MD with Herniated lumbar intervertebral disc [M51.26]  Spondylolisthesis of lumbar region [M43.16]  Spinal stenosis of lumbar region, unspecified whether neurogenic claudication present [M48.061] on 4/2/2018 and underwent L5-S1 discectomy and fusion with instrumentation  Per Venkatesh Valero MD. Antibiotic and VTE prophylaxis were per SCIP protocols.  The patient was admitted to the floor where IV and/or oral pain medications were administered for postoperative pain.  At discharge the incisional pain was tolerable and preop neurologic function was intact.  The dressing was dry and the wound was clean.    Condition on Discharge:  Stable    Discharge Instructions: . Patient may weight bear as tolerated unless otherwise specified. Continue MACIEL hose daily (for two weeks) and ice regularly. Patient also instructed on incentive spirometer during hospitalization and encouraged to continue to use at home regularly. Patient may shower on POD #3 if and when all wound drainage has stopped.  Where applicable, the brace should be worn when up and about.  It need not be worn to the bathroom and certainly not in the shower.  A detailed list of instructions specific to the operation was given to the patient at the time of discharge.    Follow up Instructions:  Follow up in the office with Dr. Venkatesh Valero Jr. in 2-3 weeks - patient to call the office at [phone] to schedule. Prescriptions were given for pain medication.    Follow-up Appointments  Future Appointments  Date Time Provider Department Center   4/17/2018 11:20 AM MD LANI Durant [facility] None   4/24/2018 10:30 AM LAB CHAIR 2 [facility]  LAB [facility]   4/24/2018 11:00 AM MD LANI Toribio [facility]     Additional Instructions for the Follow-ups that You Need to Schedule     Discharge Follow-up with Specialty: Orthopedics    As directed      Specialty:  Orthopedics    Follow Up Details:  Return to the  office to see Dr. Venkatesh Valero               Discharge Disposition Plan:today to home    Date: 4/4/2018    Renetta Bell RN  04/04/18  11:08 AM    Venkatesh Valero MD

## 2018-04-04 NOTE — TELEPHONE ENCOUNTER
----- Message from Ester Stevens MD sent at 4/4/2018 10:22 AM EDT -----  Please schedule follow-up with Dr. Stevens in 3 weeks, 2 unit appointment with [facility]OWER, CMP.    Ester Stevens MD

## 2018-04-06 LAB
REF LAB TEST METHOD: NORMAL
REF LAB TEST METHOD: NORMAL

## 2018-04-11 DIAGNOSIS — M48.062 SPINAL STENOSIS OF LUMBAR REGION WITH NEUROGENIC CLAUDICATION: ICD-10-CM

## 2018-04-11 DIAGNOSIS — R26.2 DIFFICULTY WALKING: ICD-10-CM

## 2018-04-11 DIAGNOSIS — M51.26 HERNIATED LUMBAR INTERVERTEBRAL DISC: ICD-10-CM

## 2018-04-11 DIAGNOSIS — M43.16 SPONDYLOLISTHESIS OF LUMBAR REGION: ICD-10-CM

## 2018-04-11 RX ORDER — OXYCODONE HYDROCHLORIDE AND ACETAMINOPHEN 5; 325 MG/1; MG/1
TABLET ORAL
Qty: 60 TABLET | Refills: 0
Start: 2018-04-11 | End: 2018-04-13 | Stop reason: SDUPTHER

## 2018-04-13 DIAGNOSIS — M51.26 HERNIATED LUMBAR INTERVERTEBRAL DISC: ICD-10-CM

## 2018-04-13 DIAGNOSIS — M43.16 SPONDYLOLISTHESIS OF LUMBAR REGION: ICD-10-CM

## 2018-04-13 DIAGNOSIS — R26.2 DIFFICULTY WALKING: ICD-10-CM

## 2018-04-13 DIAGNOSIS — M48.062 SPINAL STENOSIS OF LUMBAR REGION WITH NEUROGENIC CLAUDICATION: ICD-10-CM

## 2018-04-13 RX ORDER — OXYCODONE HYDROCHLORIDE AND ACETAMINOPHEN 5; 325 MG/1; MG/1
TABLET ORAL
Qty: 60 TABLET | Refills: 0 | Status: SHIPPED | OUTPATIENT
Start: 2018-04-13 | End: 2018-04-17 | Stop reason: SDUPTHER

## 2018-04-17 ENCOUNTER — OFFICE VISIT (OUTPATIENT)
Dept: ORTHOPEDIC SURGERY | Facility: CLINIC | Age: 38
End: 2018-04-17

## 2018-04-17 VITALS — BODY MASS INDEX: 37.05 KG/M2 | TEMPERATURE: 98.5 F | HEIGHT: 64 IN | WEIGHT: 217 LBS

## 2018-04-17 DIAGNOSIS — Z98.890 S/P LUMBAR DISCECTOMY: Primary | ICD-10-CM

## 2018-04-17 DIAGNOSIS — Z98.1 S/P LUMBAR FUSION: ICD-10-CM

## 2018-04-17 DIAGNOSIS — M48.062 SPINAL STENOSIS OF LUMBAR REGION WITH NEUROGENIC CLAUDICATION: ICD-10-CM

## 2018-04-17 DIAGNOSIS — R26.2 DIFFICULTY WALKING: ICD-10-CM

## 2018-04-17 DIAGNOSIS — M51.26 HERNIATED LUMBAR INTERVERTEBRAL DISC: ICD-10-CM

## 2018-04-17 DIAGNOSIS — M43.16 SPONDYLOLISTHESIS OF LUMBAR REGION: ICD-10-CM

## 2018-04-17 PROCEDURE — 99024 POSTOP FOLLOW-UP VISIT: CPT | Performed by: ORTHOPAEDIC SURGERY

## 2018-04-17 PROCEDURE — 72100 X-RAY EXAM L-S SPINE 2/3 VWS: CPT | Performed by: ORTHOPAEDIC SURGERY

## 2018-04-17 RX ORDER — OXYCODONE HYDROCHLORIDE AND ACETAMINOPHEN 5; 325 MG/1; MG/1
TABLET ORAL
Qty: 60 TABLET | Refills: 0 | Status: SHIPPED | OUTPATIENT
Start: 2018-04-17 | End: 2018-06-04 | Stop reason: SDUPTHER

## 2018-04-24 ENCOUNTER — OFFICE VISIT (OUTPATIENT)
Dept: ONCOLOGY | Facility: CLINIC | Age: 38
End: 2018-04-24

## 2018-04-24 ENCOUNTER — LAB (OUTPATIENT)
Dept: LAB | Facility: HOSPITAL | Age: 38
End: 2018-04-24

## 2018-04-24 VITALS
SYSTOLIC BLOOD PRESSURE: 130 MMHG | RESPIRATION RATE: 16 BRPM | BODY MASS INDEX: 36.25 KG/M2 | TEMPERATURE: 98.4 F | HEIGHT: 65 IN | OXYGEN SATURATION: 99 % | HEART RATE: 87 BPM | DIASTOLIC BLOOD PRESSURE: 80 MMHG | WEIGHT: 217.6 LBS

## 2018-04-24 DIAGNOSIS — D72.825 BANDEMIA: Primary | ICD-10-CM

## 2018-04-24 DIAGNOSIS — M51.26 HERNIATED LUMBAR INTERVERTEBRAL DISC: Primary | ICD-10-CM

## 2018-04-24 LAB
ALBUMIN SERPL-MCNC: 4.1 G/DL (ref 3.5–5.2)
ALBUMIN/GLOB SERPL: 1.1 G/DL (ref 1.1–2.4)
ALP SERPL-CCNC: 88 U/L (ref 38–116)
ALT SERPL W P-5'-P-CCNC: 26 U/L (ref 0–33)
ANION GAP SERPL CALCULATED.3IONS-SCNC: 15 MMOL/L
AST SERPL-CCNC: 25 U/L (ref 0–32)
BASOPHILS # BLD AUTO: 0.07 10*3/MM3 (ref 0–0.1)
BASOPHILS NFR BLD AUTO: 0.5 % (ref 0–1.1)
BILIRUB SERPL-MCNC: 0.2 MG/DL (ref 0.1–1.2)
BUN BLD-MCNC: 7 MG/DL (ref 6–20)
BUN/CREAT SERPL: 9.2 (ref 7.3–30)
CALCIUM SPEC-SCNC: 9.4 MG/DL (ref 8.5–10.2)
CHLORIDE SERPL-SCNC: 99 MMOL/L (ref 98–107)
CO2 SERPL-SCNC: 25 MMOL/L (ref 22–29)
CREAT BLD-MCNC: 0.76 MG/DL (ref 0.6–1.1)
DEPRECATED RDW RBC AUTO: 42.5 FL (ref 37–49)
EOSINOPHIL # BLD AUTO: 0.16 10*3/MM3 (ref 0–0.36)
EOSINOPHIL NFR BLD AUTO: 1.2 % (ref 1–5)
ERYTHROCYTE [DISTWIDTH] IN BLOOD BY AUTOMATED COUNT: 12.2 % (ref 11.7–14.5)
GFR SERPL CREATININE-BSD FRML MDRD: 85 ML/MIN/1.73
GLOBULIN UR ELPH-MCNC: 3.6 GM/DL (ref 1.8–3.5)
GLUCOSE BLD-MCNC: 101 MG/DL (ref 74–124)
HCT VFR BLD AUTO: 43.3 % (ref 34–45)
HGB BLD-MCNC: 14.5 G/DL (ref 11.5–14.9)
IMM GRANULOCYTES # BLD: 0.05 10*3/MM3 (ref 0–0.03)
IMM GRANULOCYTES NFR BLD: 0.4 % (ref 0–0.5)
LYMPHOCYTES # BLD AUTO: 2.83 10*3/MM3 (ref 1–3.5)
LYMPHOCYTES NFR BLD AUTO: 21.3 % (ref 20–49)
MCH RBC QN AUTO: 31.7 PG (ref 27–33)
MCHC RBC AUTO-ENTMCNC: 33.5 G/DL (ref 32–35)
MCV RBC AUTO: 94.5 FL (ref 83–97)
MONOCYTES # BLD AUTO: 0.57 10*3/MM3 (ref 0.25–0.8)
MONOCYTES NFR BLD AUTO: 4.3 % (ref 4–12)
NEUTROPHILS # BLD AUTO: 9.63 10*3/MM3 (ref 1.5–7)
NEUTROPHILS NFR BLD AUTO: 72.3 % (ref 39–75)
NRBC BLD MANUAL-RTO: 0 /100 WBC (ref 0–0)
PLATELET # BLD AUTO: 416 10*3/MM3 (ref 150–375)
PMV BLD AUTO: 8.7 FL (ref 8.9–12.1)
POTASSIUM BLD-SCNC: 4.4 MMOL/L (ref 3.5–4.7)
PROT SERPL-MCNC: 7.7 G/DL (ref 6.3–8)
RBC # BLD AUTO: 4.58 10*6/MM3 (ref 3.9–5)
SODIUM BLD-SCNC: 139 MMOL/L (ref 134–145)
WBC NRBC COR # BLD: 13.31 10*3/MM3 (ref 4–10)

## 2018-04-24 PROCEDURE — 36415 COLL VENOUS BLD VENIPUNCTURE: CPT | Performed by: INTERNAL MEDICINE

## 2018-04-24 PROCEDURE — 85025 COMPLETE CBC W/AUTO DIFF WBC: CPT | Performed by: INTERNAL MEDICINE

## 2018-04-24 PROCEDURE — 80053 COMPREHEN METABOLIC PANEL: CPT | Performed by: INTERNAL MEDICINE

## 2018-04-24 PROCEDURE — 99213 OFFICE O/P EST LOW 20 MIN: CPT | Performed by: INTERNAL MEDICINE

## 2018-04-24 NOTE — PROGRESS NOTES
Subjective .     REASONS FOR FOLLOWUP:  Chronic leukocytosis, peripheral blood for flow cytometry is negative,nahun 2 negative, BCR/C Conner negative.    HISTORY OF PRESENT ILLNESS:  The patient is a 37 y.o. year old female who is here for follow-up with the above-mentioned history.    History of Present Illness   patient is a 37-year-old female with chronic leukocytosis since last year.  She does smoke and that's possible cause for this.  She is status postsurgery on her spine at L5-S1 and now it leukocytosis.  In the past she has gone as high as 22,000 and today she is 26,000 not 2 different.  She does not show any signs of infection.     We have obtained flow, Kina 2, BCR C Conner but the results are pending.  ESR is 18 normal, C-reactive protein is mildly elevated at 1.21.  She is afebrile.   patient underwent evaluation with peripheral blood for flow cytometry, kina 2 mutation and BCR C Conner and all of the blood tests are negative.    He shouldn't is a smoker and smokes 1 pack per day.  She is trying to decrease smoking.  She has had a CT abdomen pelvis in May 2017 and it did not show evidence of any splenomegaly.    Interval history : pt  denies any complaints.  She has not lost any weight.    Past Medical History:   Diagnosis Date   • Abdominal pain     LEFT SIDE   • Anxiety    • Asthma    • COPD (chronic obstructive pulmonary disease)    • COPD (chronic obstructive pulmonary disease)     early signs   • GERD (gastroesophageal reflux disease)    • History of high blood pressure     NO MEDS SINCE 7/2017   • History of transfusion     POST TUBALIGATION   • Lower back pain    • Lumbar herniated disc    • Numbness     LEFT LEG   • Panic attack    • Sleep apnea     NO C-PAP IN USE   • Ulcer 2017    ENDOSCOPY       ONCOLOGIC HISTORY:  (History from previous dates can be found in the separate document.)  patient is a 37-year-old female who has been followed by orthopedics Dr. Valero and was admitted with low back pain  radiating to the left buttock and lower extremity foot.  The back pain was thought to be very severe.  Anti-inflammatory have helped somewhat.  She also has had intramuscular steroid injections before.  Physical therapy also has been done in the past.     Per orthopedic she had some slight weakness of dorsiflexion of the left.  And had a positive straight leg raising test.  Lumbar MRI showed disc protrusion at the left side of L5-S1.  The remainder of the spine looked good.  She was thought to be a patient followed lumbosacral discectomy.  She underwent surgery today that she had L5-S1 laminectomy and left discectomy with bilateral posterior lateral fusion.  We were asked to evaluate her chronic leukocytosis.     On admission her hemoglobin was 15.1 hematocrit 45.5, white count of 14.04 and a platelet count of 369.  As of today her hemoglobin is 14.4 hematocrit is 43.3, white count is 17.54 with a platelet of 365.  She is 75% neutrophils 18.7% lymphocytes 4.6% monocytes.     Looking back in May 2017 her white count was 13.7.     Patient had undergone hysterectomy with bilateral file polyp in.  It showed indeterminate.  Most necrotic suggestive prior ablation changes.  Mild Chronic endocervicitis.  Left ovary showed cystic involution.     We have been asked to evaluate chronic leukocytosis..    Current Outpatient Prescriptions on File Prior to Visit   Medication Sig Dispense Refill   • busPIRone (BUSPAR) 15 MG tablet Take 15 mg by mouth 3 (Three) Times a Day.     • cetirizine (zyrTEC) 10 MG tablet Take 10 mg by mouth Daily.     • cholecalciferol (VITAMIN D3) 1000 UNITS tablet Take 2,000 Units by mouth Daily. HOLD PRIOR TO SURG     • cyclobenzaprine (FLEXERIL) 10 MG tablet Take 10 mg by mouth 3 (Three) Times a Day As Needed.     • LORazepam (ATIVAN) 0.5 MG tablet Take 0.5 mg by mouth Every 8 (Eight) Hours As Needed for Anxiety (1-2 tabs TID).     • omeprazole (priLOSEC) 40 MG capsule Take 1 capsule by mouth Every  "Morning Before Breakfast. 30 capsule 2   • oxyCODONE-acetaminophen (PERCOCET) 5-325 MG per tablet Take 1-2 tabs po q 4 hours prn pain 60 tablet 0   • polyethylene glycol (MIRALAX) pack packet Take 17 g by mouth Daily.     • PROAIR  (90 BASE) MCG/ACT inhaler Inhale 1 puff Every 4 (Four) Hours As Needed.     • sennosides-docusate sodium (SENOKOT-S) 8.6-50 MG tablet Take 1 tablet by mouth 2 (Two) Times a Day. 60 tablet 0     No current facility-administered medications on file prior to visit.        ALLERGIES:     Allergies   Allergen Reactions   • Pyridium [Phenazopyridine Hcl]      UNKNOWN   • Bactrim [Sulfamethoxazole-Trimethoprim] Palpitations   • Penicillins Palpitations       Social History     Social History   • Marital status:      Spouse name: N/A   • Number of children: N/A   • Years of education: N/A     Occupational History   • Not on file.     Social History Main Topics   • Smoking status: Current Every Day Smoker     Packs/day: 1.00     Years: 14.00     Types: Cigarettes     Start date: 2000     Last attempt to quit: 3/27/2018   • Smokeless tobacco: Never Used   • Alcohol use No   • Drug use: No   • Sexual activity: Defer     Other Topics Concern   • Not on file     Social History Narrative   • No narrative on file         Cancer-related family history is not on file.     Review of Systems  A comprehensive 14 point review of systems was performed and was negative except as mentioned.    Objective      Vitals:    04/24/18 1047   BP: 130/80   Pulse: 87   Resp: 16   Temp: 98.4 °F (36.9 °C)   SpO2: 99%  Comment: at rest   Weight: 98.7 kg (217 lb 9.6 oz)   Height: 164.5 cm (64.76\")   PainSc:   4   PainLoc: Back  Comment: back surgery on april 2nd     Current Status 4/24/2018   ECOG score 0       Physical Exam    GENERAL:  Well-developed, well-nourished in no acute distress.   NECK:  Supple with good range of motion; no thyromegaly or masses, no JVD.  LYMPHATICS:  No cervical, supraclavicular, " axillary or inguinal adenopathy.  CHEST:  Lungs clear to auscultation. Good airflow.  CARDIAC:  Regular rate and rhythm without murmurs, rubs or gallops. Normal S1,S2.  ABDOMEN:  Soft, nontender with no hepatosplenomegaly or masses.  EXTREMITIES:  No clubbing, cyanosis or edema.  NEUROLOGICAL:  Cranial Nerves II-XII grossly intact.  No focal neurological deficits.  PSYCHIATRIC:  Normal affect and mood.        RECENT LABS:  Hematology WBC   Date Value Ref Range Status   04/24/2018 13.31 (H) 4.00 - 10.00 10*3/mm3 Final     RBC   Date Value Ref Range Status   04/24/2018 4.58 3.90 - 5.00 10*6/mm3 Final     Hemoglobin   Date Value Ref Range Status   04/24/2018 14.5 11.5 - 14.9 g/dL Final     Hematocrit   Date Value Ref Range Status   04/24/2018 43.3 34.0 - 45.0 % Final     Platelets   Date Value Ref Range Status   04/24/2018 416 (H) 150 - 375 10*3/mm3 Final        Assessment/Plan     1.  Status post L5 to S1 laminectomy and left discectomy this a.m.  Patient has pain related to surgery and is on a pain pump.     2.  Chronic leukocytosis.  Patient states that she is a smoker and quit smoking before her surgery this time.  She does get bronchitis on and off.  Looking back at her lab her blood counts her white count has been anywhere in the range of 14,000 to 22,000.  She has a normal differential count.  She is afebrile.  She does not have any chills.  Currently she denies any bronchitis.  Given the chronic nature of her leukocytosis  So far workup for myeloproliferative disorder is negative.   , peripheral blood for flow cytometry is negative,nahun 2 negative, BCR/C Conner negative.  No evidence of splenomegaly.  It is likely that smoking could cause her chronic leukocytosis.  She does not show evidence of any chronic bronchitis at present or sinusitis.  She say she gets bronchitis once a year.  We'll follow her back in 6 months with a CBC check.  I have encouraged her to quit smoking and that may show beneficial  effect.    Will follow-up in 6 months with labs.    Ester Stevens MD                  Cc:  Venkatesh Valero MD

## 2018-05-29 ENCOUNTER — OFFICE VISIT (OUTPATIENT)
Dept: ORTHOPEDIC SURGERY | Facility: CLINIC | Age: 38
End: 2018-05-29

## 2018-05-29 VITALS — WEIGHT: 222 LBS | HEIGHT: 64 IN | TEMPERATURE: 98.9 F | BODY MASS INDEX: 37.9 KG/M2

## 2018-05-29 DIAGNOSIS — Z98.1 S/P LUMBAR SPINAL FUSION: Primary | ICD-10-CM

## 2018-05-29 DIAGNOSIS — Z98.890 S/P LUMBAR DISCECTOMY: ICD-10-CM

## 2018-05-29 PROCEDURE — 72100 X-RAY EXAM L-S SPINE 2/3 VWS: CPT | Performed by: ORTHOPAEDIC SURGERY

## 2018-05-29 PROCEDURE — 99024 POSTOP FOLLOW-UP VISIT: CPT | Performed by: ORTHOPAEDIC SURGERY

## 2018-06-04 DIAGNOSIS — M43.16 SPONDYLOLISTHESIS OF LUMBAR REGION: ICD-10-CM

## 2018-06-04 DIAGNOSIS — M51.26 HERNIATED LUMBAR INTERVERTEBRAL DISC: ICD-10-CM

## 2018-06-04 DIAGNOSIS — R26.2 DIFFICULTY WALKING: ICD-10-CM

## 2018-06-04 DIAGNOSIS — M48.062 SPINAL STENOSIS OF LUMBAR REGION WITH NEUROGENIC CLAUDICATION: ICD-10-CM

## 2018-06-04 RX ORDER — OXYCODONE HYDROCHLORIDE AND ACETAMINOPHEN 5; 325 MG/1; MG/1
TABLET ORAL
Qty: 60 TABLET | Refills: 0 | Status: SHIPPED | OUTPATIENT
Start: 2018-06-04

## 2018-06-04 NOTE — TELEPHONE ENCOUNTER
----- Message from Terra Deutsch sent at 6/3/2018  8:42 AM EDT -----  Regarding: Prescription Question  Contact: [phone]  [employer] MESSAGE  Good Morning, I was wanting to know if I can get a refill on my pain prescription? Thank you and have a blessed day.

## 2018-07-05 DIAGNOSIS — M43.16 SPONDYLOLISTHESIS OF LUMBAR REGION: ICD-10-CM

## 2018-07-05 DIAGNOSIS — M51.26 HERNIATED LUMBAR INTERVERTEBRAL DISC: ICD-10-CM

## 2018-07-05 DIAGNOSIS — R26.2 DIFFICULTY WALKING: ICD-10-CM

## 2018-07-05 DIAGNOSIS — M48.062 SPINAL STENOSIS OF LUMBAR REGION WITH NEUROGENIC CLAUDICATION: ICD-10-CM

## 2018-07-05 RX ORDER — OXYCODONE HYDROCHLORIDE AND ACETAMINOPHEN 5; 325 MG/1; MG/1
TABLET ORAL
Qty: 60 TABLET | Refills: 0 | OUTPATIENT
Start: 2018-07-05

## 2018-07-05 NOTE — TELEPHONE ENCOUNTER
Spoke with patient and informed her that CHRISTINYOANAW is out of the office until 7/10. She is aware that she is to wait until he is back in the office to get a response in regards to her rx refill.

## 2018-07-05 NOTE — TELEPHONE ENCOUNTER
----- Message from Terra Deutsch sent at 7/3/2018  7:45 PM EDT -----  Regarding: Prescription Question  Contact: [phone]  I will be out of my pain meds on the 5th. . Can you please refill my medicine.

## 2019-01-31 ENCOUNTER — APPOINTMENT (OUTPATIENT)
Dept: ONCOLOGY | Facility: CLINIC | Age: 39
End: 2019-01-31

## 2019-01-31 ENCOUNTER — APPOINTMENT (OUTPATIENT)
Dept: LAB | Facility: HOSPITAL | Age: 39
End: 2019-01-31

## 2023-09-04 ENCOUNTER — HOSPITAL ENCOUNTER (EMERGENCY)
Facility: HOSPITAL | Age: 43
Discharge: HOME | End: 2023-09-04
Attending: EMERGENCY MEDICINE | Admitting: EMERGENCY MEDICINE
Payer: MEDICAID

## 2023-09-04 VITALS
OXYGEN SATURATION: 100 % | HEART RATE: 66 BPM | HEIGHT: 63 IN | WEIGHT: 145.94 LBS | TEMPERATURE: 97.9 F | RESPIRATION RATE: 18 BRPM | BODY MASS INDEX: 25.86 KG/M2 | SYSTOLIC BLOOD PRESSURE: 135 MMHG | DIASTOLIC BLOOD PRESSURE: 64 MMHG

## 2023-09-04 DIAGNOSIS — U07.1 COVID-19: Primary | ICD-10-CM

## 2023-09-04 LAB
ALBUMIN SERPL-MCNC: 3.8 G/DL (ref 3.5–5.2)
ALBUMIN/GLOB SERPL: 1.5 G/DL
ALP SERPL-CCNC: 52 U/L (ref 39–117)
ALT SERPL W P-5'-P-CCNC: 16 U/L (ref 1–33)
ANION GAP SERPL CALCULATED.3IONS-SCNC: 9.7 MMOL/L (ref 5–15)
AST SERPL-CCNC: 17 U/L (ref 1–32)
BASOPHILS # BLD AUTO: 0.03 10*3/MM3 (ref 0–0.2)
BASOPHILS NFR BLD AUTO: 0.5 % (ref 0–1.5)
BILIRUB SERPL-MCNC: 0.2 MG/DL (ref 0–1.2)
BUN SERPL-MCNC: 7 MG/DL (ref 6–20)
BUN/CREAT SERPL: 11.3 (ref 7–25)
CALCIUM SPEC-SCNC: 8.9 MG/DL (ref 8.6–10.5)
CHLORIDE SERPL-SCNC: 104 MMOL/L (ref 98–107)
CO2 SERPL-SCNC: 26.3 MMOL/L (ref 22–29)
CREAT SERPL-MCNC: 0.62 MG/DL (ref 0.57–1)
DEPRECATED RDW RBC AUTO: 41.1 FL (ref 37–54)
EGFRCR SERPLBLD CKD-EPI 2021: 112.1 ML/MIN/1.73
EOSINOPHIL # BLD AUTO: 0.07 10*3/MM3 (ref 0–0.4)
EOSINOPHIL NFR BLD AUTO: 1.1 % (ref 0.3–6.2)
ERYTHROCYTE [DISTWIDTH] IN BLOOD BY AUTOMATED COUNT: 11.9 % (ref 12.3–15.4)
FLUAV AG NPH QL: NEGATIVE
FLUBV AG NPH QL IA: NEGATIVE
GLOBULIN UR ELPH-MCNC: 2.6 GM/DL
GLUCOSE SERPL-MCNC: 85 MG/DL (ref 65–99)
HCT VFR BLD AUTO: 39.2 % (ref 34–46.6)
HGB BLD-MCNC: 13.4 G/DL (ref 12–15.9)
HOLD SPECIMEN: NORMAL
HOLD SPECIMEN: NORMAL
IMM GRANULOCYTES # BLD AUTO: 0.02 10*3/MM3 (ref 0–0.05)
IMM GRANULOCYTES NFR BLD AUTO: 0.3 % (ref 0–0.5)
LIPASE SERPL-CCNC: 22 U/L (ref 13–60)
LYMPHOCYTES # BLD AUTO: 0.79 10*3/MM3 (ref 0.7–3.1)
LYMPHOCYTES NFR BLD AUTO: 12.7 % (ref 19.6–45.3)
MCH RBC QN AUTO: 31.7 PG (ref 26.6–33)
MCHC RBC AUTO-ENTMCNC: 34.2 G/DL (ref 31.5–35.7)
MCV RBC AUTO: 92.7 FL (ref 79–97)
MONOCYTES # BLD AUTO: 0.73 10*3/MM3 (ref 0.1–0.9)
MONOCYTES NFR BLD AUTO: 11.8 % (ref 5–12)
NEUTROPHILS NFR BLD AUTO: 4.56 10*3/MM3 (ref 1.7–7)
NEUTROPHILS NFR BLD AUTO: 73.6 % (ref 42.7–76)
NRBC BLD AUTO-RTO: 0 /100 WBC (ref 0–0.2)
PLATELET # BLD AUTO: 223 10*3/MM3 (ref 140–450)
PMV BLD AUTO: 9.1 FL (ref 6–12)
POTASSIUM SERPL-SCNC: 3.7 MMOL/L (ref 3.5–5.2)
PROT SERPL-MCNC: 6.4 G/DL (ref 6–8.5)
RBC # BLD AUTO: 4.23 10*6/MM3 (ref 3.77–5.28)
SARS-COV-2 RNA RESP QL NAA+PROBE: DETECTED
SODIUM SERPL-SCNC: 140 MMOL/L (ref 136–145)
WBC NRBC COR # BLD: 6.2 10*3/MM3 (ref 3.4–10.8)
WHOLE BLOOD HOLD COAG: NORMAL
WHOLE BLOOD HOLD SPECIMEN: NORMAL

## 2023-09-04 PROCEDURE — 25010000002 ONDANSETRON PER 1 MG: Performed by: EMERGENCY MEDICINE

## 2023-09-04 PROCEDURE — 36415 COLL VENOUS BLD VENIPUNCTURE: CPT

## 2023-09-04 PROCEDURE — 99283 EMERGENCY DEPT VISIT LOW MDM: CPT

## 2023-09-04 PROCEDURE — 87804 INFLUENZA ASSAY W/OPTIC: CPT | Performed by: EMERGENCY MEDICINE

## 2023-09-04 PROCEDURE — 96375 TX/PRO/DX INJ NEW DRUG ADDON: CPT

## 2023-09-04 PROCEDURE — 85025 COMPLETE CBC W/AUTO DIFF WBC: CPT | Performed by: EMERGENCY MEDICINE

## 2023-09-04 PROCEDURE — 80053 COMPREHEN METABOLIC PANEL: CPT | Performed by: EMERGENCY MEDICINE

## 2023-09-04 PROCEDURE — 87635 SARS-COV-2 COVID-19 AMP PRB: CPT | Performed by: EMERGENCY MEDICINE

## 2023-09-04 PROCEDURE — 83690 ASSAY OF LIPASE: CPT | Performed by: EMERGENCY MEDICINE

## 2023-09-04 PROCEDURE — 96374 THER/PROPH/DIAG INJ IV PUSH: CPT

## 2023-09-04 RX ORDER — ONDANSETRON 2 MG/ML
4 INJECTION INTRAMUSCULAR; INTRAVENOUS ONCE
Status: COMPLETED | OUTPATIENT
Start: 2023-09-04 | End: 2023-09-04

## 2023-09-04 RX ORDER — SODIUM CHLORIDE 0.9 % (FLUSH) 0.9 %
10 SYRINGE (ML) INJECTION AS NEEDED
Status: DISCONTINUED | OUTPATIENT
Start: 2023-09-04 | End: 2023-09-04 | Stop reason: HOSPADM

## 2023-09-04 RX ORDER — FAMOTIDINE 10 MG/ML
20 INJECTION, SOLUTION INTRAVENOUS ONCE
Status: COMPLETED | OUTPATIENT
Start: 2023-09-04 | End: 2023-09-04

## 2023-09-04 RX ORDER — DICYCLOMINE HCL 20 MG
20 TABLET ORAL EVERY 6 HOURS
Qty: 20 TABLET | Refills: 0 | Status: SHIPPED | OUTPATIENT
Start: 2023-09-04

## 2023-09-04 RX ORDER — ONDANSETRON 4 MG/1
4 TABLET, ORALLY DISINTEGRATING ORAL EVERY 8 HOURS PRN
Qty: 15 TABLET | Refills: 0 | Status: SHIPPED | OUTPATIENT
Start: 2023-09-04

## 2023-09-04 RX ADMIN — FAMOTIDINE 20 MG: 10 INJECTION INTRAVENOUS at 10:54

## 2023-09-04 RX ADMIN — ONDANSETRON 4 MG: 2 INJECTION INTRAMUSCULAR; INTRAVENOUS at 10:53

## 2023-09-04 RX ADMIN — SODIUM CHLORIDE 1000 ML: 9 INJECTION, SOLUTION INTRAVENOUS at 10:51

## 2023-09-04 NOTE — Clinical Note
[facility] EMERGENCY ROOM  [address]  [place] [address]  Phone: [phone]    Terra Deutsch was seen and treated in our emergency department on 9/4/2023.  She may return to work on 09/06/2023.         Thank you for choosing [facility].    Alvarado Luke MD

## 2023-09-04 NOTE — ED PROVIDER NOTES
Time: 10:19 AM EDT  Date of encounter:  9/4/2023  Independent Historian/Clinical History and Information was obtained by:   Patient    History is limited by: N/A    Chief Complaint: Nausea and vomiting      History of Present Illness:  Patient is a 43 y.o. year old female who presents to the emergency department for evaluation of nausea and vomiting that started yesterday.  Patient denies abdominal pain.  Patient has no dysuria urinary frequency.  Patient has no cough hemoptysis.  Patient reports that several other people at work are sick as well.    HPI    Patient Care Team  Primary Care Provider: Rich Flores MD    Past Medical History:     Allergies   Allergen Reactions    Pyridium [Phenazopyridine Hcl]      UNKNOWN    Bactrim [Sulfamethoxazole-Trimethoprim] Palpitations    Penicillins Palpitations     Past Medical History:   Diagnosis Date    Abdominal pain     LEFT SIDE    Anxiety     Asthma     COPD (chronic obstructive pulmonary disease)     COPD (chronic obstructive pulmonary disease)     early signs    GERD (gastroesophageal reflux disease)     History of high blood pressure     NO MEDS SINCE 7/2017    History of transfusion     POST TUBALIGATION    Lower back pain     Lumbar herniated disc     Numbness     LEFT LEG    Panic attack     Sleep apnea     NO C-PAP IN USE    Ulcer 2017    ENDOSCOPY     Past Surgical History:   Procedure Laterality Date    COLONOSCOPY N/A 7/12/2017    - Internal hemorrhoids. Anal papilla(e) were hypertrophied One 4 mm polyp in the descending colon,One 4 mm polyp in the rectum, removed with a cold biopsy forceps. Resected and retrieved removed with a cold biopsy forceps.    D & C CERVICAL BIOPSY  2017    ENDOSCOPY N/A 7/12/2017    Procedure: ESOPHAGOGASTRODUODENOSCOPY with bx;  Surgeon: Cherrie Lazaro MD;  Location: [facility] ENDOSCOPY;  Service:     LUMBAR DISCECTOMY FUSION INSTRUMENTATION Left 4/2/2018    Procedure: L5-S1 discectomy and fusion with instrumentation;   Surgeon: Venkatesh Valero MD;  Location: [facility] MAIN OR;  Service: Neurosurgery    TOTAL LAPAROSCOPIC HYSTERECTOMY Bilateral 7/24/2017    Procedure: TOTAL LAPAROSCOPIC HYSTERECTOMY WITH DAVINCI ROBOT MATTI SALPINGECTOMY LEFT OOPHORECTOMY;  Surgeon: Julianna Saenz MD;  Location: [facility] MAIN OR;  Service:     TUBAL ABDOMINAL LIGATION  2004     Family History   Problem Relation Age of Onset    Heart disease Mother     Hypertension Mother     Heart disease Sister     Malig Hyperthermia Neg Hx        Home Medications:  Prior to Admission medications    Medication Sig Start Date End Date Taking? Authorizing Provider   polyethylene glycol (MIRALAX) pack packet Take 17 g by mouth Daily. 4/4/18   Venkatesh Valero MD   busPIRone (BUSPAR) 15 MG tablet Take 15 mg by mouth 3 (Three) Times a Day.  9/4/23  Timbo Hurst MD   cetirizine (zyrTEC) 10 MG tablet Take 1 tablet by mouth Daily.  9/4/23  Timbo Hurst MD   cholecalciferol (VITAMIN D3) 1000 UNITS tablet Take 2,000 Units by mouth Daily. HOLD PRIOR TO SURG  9/4/23  Timbo Hurst MD   cyclobenzaprine (FLEXERIL) 10 MG tablet Take 10 mg by mouth 3 (Three) Times a Day As Needed. 10/12/17 9/4/23  Timbo Hurst MD   LORazepam (ATIVAN) 0.5 MG tablet Take 0.5 mg by mouth Every 8 (Eight) Hours As Needed for Anxiety (1-2 tabs TID).  9/4/23  Timbo Hurst MD   omeprazole (priLOSEC) 40 MG capsule Take 1 capsule by mouth Every Morning Before Breakfast. 7/12/17 9/4/23  Cherrie Lazaro MD   oxyCODONE-acetaminophen (PERCOCET) 5-325 MG per tablet Take 1-2 tabs po q 4 hours prn pain 6/4/18 9/4/23  Venkatesh Valero MD   PROAIR  (90 BASE) MCG/ACT inhaler Inhale 1 puff Every 4 (Four) Hours As Needed. 5/20/17 9/4/23  Timbo Hurst MD   sennosides-docusate sodium (SENOKOT-S) 8.6-50 MG tablet Take 1 tablet by mouth 2 (Two) Times a Day. 4/4/18 9/4/23  Venkatesh Valero MD        Social History:   Social History     Tobacco Use    Smoking  "status: Every Day     Packs/day: 1.00     Years: 14.00     Pack years: 14.00     Types: Cigarettes     Start date:      Last attempt to quit: 3/27/2018     Years since quittin.4    Smokeless tobacco: Never   Substance Use Topics    Alcohol use: No    Drug use: No         Review of Systems:  Review of Systems   Constitutional:  Negative for chills and fever.   HENT:  Negative for congestion, rhinorrhea and sore throat.    Eyes:  Negative for pain and visual disturbance.   Respiratory:  Negative for apnea, cough, chest tightness and shortness of breath.    Cardiovascular:  Negative for chest pain and palpitations.   Gastrointestinal:  Positive for nausea and vomiting. Negative for abdominal pain and diarrhea.   Genitourinary:  Negative for difficulty urinating and dysuria.   Musculoskeletal:  Negative for joint swelling and myalgias.   Skin:  Negative for color change.   Neurological:  Negative for seizures and headaches.   Psychiatric/Behavioral: Negative.     All other systems reviewed and are negative.     Physical Exam:  /64   Pulse 66   Temp 97.9 °F (36.6 °C) (Oral)   Resp 18   Ht 160 cm (63\")   Wt 66.2 kg (145 lb 15.1 oz)   LMP 2017   SpO2 100%   BMI 25.85 kg/m²     Physical Exam  Vitals and nursing note reviewed.   Constitutional:       General: She is not in acute distress.     Appearance: Normal appearance. She is not toxic-appearing.   HENT:      Head: Normocephalic and atraumatic.      Jaw: There is normal jaw occlusion.   Eyes:      General: Lids are normal.      Extraocular Movements: Extraocular movements intact.      Conjunctiva/sclera: Conjunctivae normal.      Pupils: Pupils are equal, round, and reactive to light.   Cardiovascular:      Rate and Rhythm: Normal rate and regular rhythm.      Pulses: Normal pulses.      Heart sounds: Normal heart sounds.   Pulmonary:      Effort: Pulmonary effort is normal. No respiratory distress.      Breath sounds: Normal breath sounds. No " wheezing or rhonchi.   Abdominal:      General: Abdomen is flat.      Palpations: Abdomen is soft.      Tenderness: There is no abdominal tenderness. There is no guarding or rebound.   Musculoskeletal:         General: Normal range of motion.      Cervical back: Normal range of motion and neck supple.      Right lower leg: No edema.      Left lower leg: No edema.   Skin:     General: Skin is warm and dry.   Neurological:      Mental Status: She is alert and oriented to person, place, and time. Mental status is at baseline.   Psychiatric:         Mood and Affect: Mood normal.              Procedures:  Procedures      Medical Decision Making:      Comorbidities that affect care:    Smoking    External Notes reviewed:    Hospital Discharge Summary: Patient was discharged after having a discectomy with fusion      The following orders were placed and all results were independently analyzed by me:  Orders Placed This Encounter   Procedures    COVID-19,CEPHEID/ANABELL,COR/CHARMAINE/PAD/EDDIE/MAD IN-HOUSE(OR EMERGENT/ADD-ON),NP SWAB IN TRANSPORT MEDIA 3-4 HR TAT, RT-PCR - Swab, Nasopharynx    Influenza Antigen, Rapid - Swab, Nasopharynx    [place] Draw    Comprehensive Metabolic Panel    Lipase    Urinalysis With Microscopic If Indicated (No Culture) - Urine, Clean Catch    CBC Auto Differential    NPO Diet NPO Type: Strict NPO    Undress & Gown    Insert Peripheral IV    CBC & Differential    Green Top (Gel)    Lavender Top    Gold Top - SST    Light Blue Top       Medications Given in the Emergency Department:  Medications   sodium chloride 0.9 % flush 10 mL (has no administration in time range)   sodium chloride 0.9 % bolus 1,000 mL (1,000 mL Intravenous New Bag 9/4/23 1051)   ondansetron (ZOFRAN) injection 4 mg (4 mg Intravenous Given 9/4/23 1053)   famotidine (PEPCID) injection 20 mg (20 mg Intravenous Given 9/4/23 1054)        ED Course:         Labs:    Lab Results (last 24 hours)       Procedure Component Value Units  Date/Time    COVID-19,CEPHEID/ANABELL,COR/CHARMAINE/PAD/EDDIE/MAD IN-HOUSE(OR EMERGENT/ADD-ON),NP SWAB IN TRANSPORT MEDIA 3-4 HR TAT, RT-PCR - Swab, Nasopharynx [[id number]]  (Abnormal) Collected: 09/04/23 0953    Specimen: Swab from Nasopharynx Updated: 09/04/23 1157     COVID19 Detected    Narrative:      Fact sheet for providers: https://www.fda.gov/media/860284/download     Fact sheet for patients: https://www.fda.gov/media/511099/download  Fact sheet for providers: https://www.fda.gov/media/941974/download     Fact sheet for patients: https://www.fda.gov/media/000446/download    Influenza Antigen, Rapid - Swab, Nasopharynx [[id number]]  (Normal) Collected: 09/04/23 0953    Specimen: Swab from Nasopharynx Updated: 09/04/23 1129     Influenza A Ag, EIA Negative     Influenza B Ag, EIA Negative    CBC & Differential [[id number]]  (Abnormal) Collected: 09/04/23 0957    Specimen: Blood Updated: 09/04/23 1056    Narrative:      The following orders were created for panel order CBC & Differential.  Procedure                               Abnormality         Status                     ---------                               -----------         ------                     CBC Auto Differential[[id number]]        Abnormal            Final result                 Please view results for these tests on the individual orders.    Comprehensive Metabolic Panel [[id number]] Collected: 09/04/23 0957    Specimen: Blood Updated: 09/04/23 1113     Glucose 85 mg/dL      BUN 7 mg/dL      Creatinine 0.62 mg/dL      Sodium 140 mmol/L      Potassium 3.7 mmol/L      Chloride 104 mmol/L      CO2 26.3 mmol/L      Calcium 8.9 mg/dL      Total Protein 6.4 g/dL      Albumin 3.8 g/dL      ALT (SGPT) 16 U/L      AST (SGOT) 17 U/L      Alkaline Phosphatase 52 U/L      Total Bilirubin 0.2 mg/dL      Globulin 2.6 gm/dL      A/G Ratio 1.5 g/dL      BUN/Creatinine Ratio 11.3     Anion Gap 9.7 mmol/L      eGFR 112.1 mL/min/1.73     Narrative:      GFR Normal  >60  Chronic Kidney Disease <60  Kidney Failure <15      Lipase [[id number]]  (Normal) Collected: 09/04/23 0957    Specimen: Blood Updated: 09/04/23 1113     Lipase 22 U/L     CBC Auto Differential [[id number]]  (Abnormal) Collected: 09/04/23 0957    Specimen: Blood Updated: 09/04/23 1056     WBC 6.20 10*3/mm3      RBC 4.23 10*6/mm3      Hemoglobin 13.4 g/dL      Hematocrit 39.2 %      MCV 92.7 fL      MCH 31.7 pg      MCHC 34.2 g/dL      RDW 11.9 %      RDW-SD 41.1 fl      MPV 9.1 fL      Platelets 223 10*3/mm3      Neutrophil % 73.6 %      Lymphocyte % 12.7 %      Monocyte % 11.8 %      Eosinophil % 1.1 %      Basophil % 0.5 %      Immature Grans % 0.3 %      Neutrophils, Absolute 4.56 10*3/mm3      Lymphocytes, Absolute 0.79 10*3/mm3      Monocytes, Absolute 0.73 10*3/mm3      Eosinophils, Absolute 0.07 10*3/mm3      Basophils, Absolute 0.03 10*3/mm3      Immature Grans, Absolute 0.02 10*3/mm3      nRBC 0.0 /100 WBC              Imaging:    No Radiology Exams Resulted Within Past 24 Hours      Differential Diagnosis and Discussion:    Weakness: Based on the patient's history, signs, and symptoms, the diffential diagnosis includes but is not limited to meningitis, stroke, sepsis, subarachnoid hemorrhage, intracranial bleeding, encephalitis, acute uti, dehydration, MS, myasthenia gravis, Guillan Viola, migraine variant, neuromuscular disorders vertigo, electrolyte imbalance, and metabolic disorders.    All labs were reviewed and interpreted by me.    MDM  Number of Diagnoses or Management Options  COVID-19  Diagnosis management comments: The patient´s CBC that was reviewed and interpreted by me shows no abnormalities of critical concern. Of note, there is no anemia requiring a blood transfusion and the platelet count is acceptable.  The patient´s CMP that was reviewed and interpretted by me shows no abnormalities of critical concern. Of note, the patient´s sodium and potassium are acceptable. The patient´s liver  enzymes are unremarkable. The patient´s renal function (creatinine) is preserved. The patient has a normal anion gap.  The patient is resting comfortably and feels better, is alert and in no distress. Influenza swab is negative.  COVID-19 swab is positive.  On re-examination the patient does not appear toxic and has no meningeal signs (including a negative Kernig and Brudzinski sign), and there's no intractable vomiting, respiratory distress and no apparent pain. Based on the history, exam, diagnostic testing and reassessment, the patient has no signs of meningitis, significant pneumonia, pyelonephritis, sepsis or other acute serious bacterial infections, or other significant pathology to warrant further testing, continued ED treatment, admission or specialist evaluation. The patient's vital signs have been stable. The patient's condition is stable and is appropriate for discharge.  The patient´s symptoms are consistent with a viral syndrome. The patient was counseled to return to the ED for re-evaluation for worsening cough, shortness of breath, uncontrollable headache, uncontrollable fever, altered mental status, or any symptoms which cause them concern. The patient will pursue further outpatient evaluation with the primary care physician or other designated or consultant physician as indicated in the discharge instructions.       Amount and/or Complexity of Data Reviewed  Clinical lab tests: reviewed    Risk of Complications, Morbidity, and/or Mortality  Presenting problems: moderate  Management options: moderate    Patient Progress  Patient progress: stable           Patient Care Considerations:    CT ABDOMEN AND PELVIS: I considered ordering a CT scan of the abdomen and pelvis however patient's abdomen is soft and nontender.      Consultants/Shared Management Plan:    None    Social Determinants of Health:    Patient is independent, reliable, and has access to care.       Disposition and Care  Coordination:    Discharged: I considered escalation of care by admitting this patient for observation, however the patient has improved and is suitable and  stable for discharge.    I have explained the patient´s condition, diagnoses and treatment plan based on the information available to me at this time. I have answered questions and addressed any concerns. The patient has a good  understanding of the patient´s diagnosis, condition, and treatment plan as can be expected at this point. The vital signs have been stable. The patient´s condition is stable and appropriate for discharge from the emergency department.      The patient will pursue further outpatient evaluation with the primary care physician or other designated or consulting physician as outlined in the discharge instructions. They are agreeable to this plan of care and follow-up instructions have been explained in detail. The patient has received these instructions in written format and have expressed an understanding of the discharge instructions. The patient is aware that any significant change in condition or worsening of symptoms should prompt an immediate return to this or the closest emergency department or call to 911.  I have explained discharge medications and the need for follow up with the patient/caretakers. This was also printed in the discharge instructions. Patient was discharged with the following medications and follow up:      Medication List        New Prescriptions      dicyclomine 20 MG tablet  Commonly known as: BENTYL  Take 1 tablet by mouth Every 6 (Six) Hours.     ondansetron ODT 4 MG disintegrating tablet  Commonly known as: ZOFRAN-ODT  Place 1 tablet on the tongue Every 8 (Eight) Hours As Needed for Nausea or Vomiting.               Where to Get Your Medications        These medications were sent to [facility]/pharmacy #[id number] - [place], [place] - [address] - [phone]  - [phone]  [address] [place] [address]  [place] [address]      Hours: 24-hours Phone: [phone]   dicyclomine 20 MG tablet  ondansetron ODT 4 MG disintegrating tablet      Rich Flores MD  [address] [id number] [phone]             Final diagnoses:   COVID-19        ED Disposition       ED Disposition   Discharge    Condition   Stable    Comment   --               This medical record created using voice recognition software.             Alvarado Luke MD  09/04/23 6823

## 2024-01-23 ENCOUNTER — HOSPITAL ENCOUNTER (EMERGENCY)
Facility: HOSPITAL | Age: 44
Discharge: HOME | End: 2024-01-23
Attending: EMERGENCY MEDICINE | Admitting: EMERGENCY MEDICINE
Payer: MEDICAID

## 2024-01-23 VITALS
BODY MASS INDEX: 24.45 KG/M2 | DIASTOLIC BLOOD PRESSURE: 111 MMHG | HEIGHT: 63 IN | TEMPERATURE: 98.4 F | OXYGEN SATURATION: 100 % | SYSTOLIC BLOOD PRESSURE: 133 MMHG | RESPIRATION RATE: 20 BRPM | WEIGHT: 138 LBS | HEART RATE: 114 BPM

## 2024-01-23 DIAGNOSIS — M54.41 CHRONIC BILATERAL LOW BACK PAIN WITH RIGHT-SIDED SCIATICA: Primary | ICD-10-CM

## 2024-01-23 DIAGNOSIS — G89.29 CHRONIC BILATERAL LOW BACK PAIN WITH RIGHT-SIDED SCIATICA: Primary | ICD-10-CM

## 2024-01-23 DIAGNOSIS — G89.29 CHRONIC RIGHT-SIDED LOW BACK PAIN WITH RIGHT-SIDED SCIATICA: ICD-10-CM

## 2024-01-23 DIAGNOSIS — Z53.21 ELOPED FROM EMERGENCY DEPARTMENT: ICD-10-CM

## 2024-01-23 DIAGNOSIS — M54.41 CHRONIC RIGHT-SIDED LOW BACK PAIN WITH RIGHT-SIDED SCIATICA: ICD-10-CM

## 2024-01-23 PROCEDURE — 97161 PT EVAL LOW COMPLEX 20 MIN: CPT | Performed by: PHYSICAL THERAPIST

## 2024-01-23 PROCEDURE — 25010000002 METHYLPREDNISOLONE PER 125 MG

## 2024-01-23 PROCEDURE — 25010000002 KETOROLAC TROMETHAMINE PER 15 MG

## 2024-01-23 PROCEDURE — 99283 EMERGENCY DEPT VISIT LOW MDM: CPT

## 2024-01-23 PROCEDURE — 97110 THERAPEUTIC EXERCISES: CPT | Performed by: PHYSICAL THERAPIST

## 2024-01-23 PROCEDURE — 96372 THER/PROPH/DIAG INJ SC/IM: CPT

## 2024-01-23 RX ORDER — KETOROLAC TROMETHAMINE 30 MG/ML
60 INJECTION, SOLUTION INTRAMUSCULAR; INTRAVENOUS ONCE
Status: COMPLETED | OUTPATIENT
Start: 2024-01-23 | End: 2024-01-23

## 2024-01-23 RX ORDER — METHYLPREDNISOLONE SODIUM SUCCINATE 125 MG/2ML
60 INJECTION, POWDER, LYOPHILIZED, FOR SOLUTION INTRAMUSCULAR; INTRAVENOUS ONCE
Status: COMPLETED | OUTPATIENT
Start: 2024-01-23 | End: 2024-01-23

## 2024-01-23 RX ORDER — PREDNISONE 50 MG/1
1 TABLET ORAL DAILY
COMMUNITY
Start: 2024-01-16

## 2024-01-23 RX ORDER — CYCLOBENZAPRINE HCL 10 MG
10 TABLET ORAL ONCE
Status: COMPLETED | OUTPATIENT
Start: 2024-01-23 | End: 2024-01-23

## 2024-01-23 RX ADMIN — KETOROLAC TROMETHAMINE 60 MG: 30 INJECTION, SOLUTION INTRAMUSCULAR; INTRAVENOUS at 13:13

## 2024-01-23 RX ADMIN — CYCLOBENZAPRINE HYDROCHLORIDE 10 MG: 10 TABLET, FILM COATED ORAL at 13:13

## 2024-01-23 RX ADMIN — METHYLPREDNISOLONE SODIUM SUCCINATE 60 MG: 125 INJECTION INTRAMUSCULAR; INTRAVENOUS at 13:37

## 2024-01-23 NOTE — ED PROVIDER NOTES
Time: 12:13 PM EST  Date of encounter:  1/23/2024  Independent Historian/Clinical History and Information was obtained by:   Patient    History is limited by: N/A    Chief Complaint: Back and hip pain      History of Present Illness:  Patient is a 43 y.o. year old female who presents to the emergency department for evaluation of pain in the low back, right hip, right upper leg for 3 days.  Patient reports history of lumbar surgery in the past, reports she was recently on steroids for sciatica but has finished the steroids and her pain has increased and has changed, radiating lower into her right upper leg.  Patient reports pain with ambulation, difficulty with finding a comfortable position with sitting.  Reports numbness/tingling that radiates down her right upper leg.    HPI    Patient Care Team  Primary Care Provider: Rich Flores MD    Past Medical History:     Allergies   Allergen Reactions    Pyridium [Phenazopyridine Hcl] Other (See Comments)     Pt unsure of reaction     Bactrim [Sulfamethoxazole-Trimethoprim] Palpitations    Penicillins Palpitations     Past Medical History:   Diagnosis Date    Abdominal pain     LEFT SIDE    Anxiety     Asthma     COPD (chronic obstructive pulmonary disease)     COPD (chronic obstructive pulmonary disease)     early signs    GERD (gastroesophageal reflux disease)     History of high blood pressure     NO MEDS SINCE 7/2017    History of transfusion     POST TUBALIGATION    Lower back pain     Lumbar herniated disc     Numbness     LEFT LEG    Panic attack     Sleep apnea     NO C-PAP IN USE    Ulcer 2017    ENDOSCOPY     Past Surgical History:   Procedure Laterality Date    COLONOSCOPY N/A 7/12/2017    - Internal hemorrhoids. Anal papilla(e) were hypertrophied One 4 mm polyp in the descending colon,One 4 mm polyp in the rectum, removed with a cold biopsy forceps. Resected and retrieved removed with a cold biopsy forceps.    D & C CERVICAL BIOPSY  2017     ENDOSCOPY N/A 2017    Procedure: ESOPHAGOGASTRODUODENOSCOPY with bx;  Surgeon: Cherrie Lazaro MD;  Location: [facility] ENDOSCOPY;  Service:     LUMBAR DISCECTOMY FUSION INSTRUMENTATION Left 2018    Procedure: L5-S1 discectomy and fusion with instrumentation;  Surgeon: Venkatesh Valero MD;  Location: [facility] MAIN OR;  Service: Neurosurgery    TOTAL LAPAROSCOPIC HYSTERECTOMY Bilateral 2017    Procedure: TOTAL LAPAROSCOPIC HYSTERECTOMY WITH DAVINCI ROBOT MATTI SALPINGECTOMY LEFT OOPHORECTOMY;  Surgeon: Julianna Saenz MD;  Location: [facility] MAIN OR;  Service:     TUBAL ABDOMINAL LIGATION       Family History   Problem Relation Age of Onset    Heart disease Mother     Hypertension Mother     Heart disease Sister     Malig Hyperthermia Neg Hx        Home Medications:  Prior to Admission medications    Medication Sig Start Date End Date Taking? Authorizing Provider   predniSONE (DELTASONE) 50 MG tablet Take 1 tablet by mouth Daily. 24  Yes Provider, MD Timbo   naproxen (NAPROSYN) 500 MG tablet Take 1 tablet by mouth 2 (Two) Times a Day As Needed for Mild Pain or Moderate Pain. 23   Cindy Blackburn PA-C        Social History:   Social History     Tobacco Use    Smoking status: Every Day     Packs/day: 1.00     Years: 14.00     Additional pack years: 0.00     Total pack years: 14.00     Types: Cigarettes     Start date:      Last attempt to quit: 3/27/2018     Years since quittin.8    Smokeless tobacco: Never   Substance Use Topics    Alcohol use: No    Drug use: No         Review of Systems:  Review of Systems   Constitutional:  Negative for fever.   HENT:  Negative for sore throat.    Eyes: Negative.    Respiratory:  Negative for cough and shortness of breath.    Cardiovascular:  Negative for chest pain.   Gastrointestinal:  Negative for abdominal pain, diarrhea and vomiting.   Genitourinary:  Negative for dysuria.   Musculoskeletal:  Positive for back pain and myalgias. Negative for  "neck pain.   Skin:  Negative for rash.   Allergic/Immunologic: Negative.    Neurological:  Negative for weakness, numbness and headaches.   Hematological: Negative.    Psychiatric/Behavioral: Negative.     All other systems reviewed and are negative.       Physical Exam:  BP (!) 133/111 (BP Location: Left arm, Patient Position: Sitting)   Pulse 114   Temp 98.4 °F (36.9 °C) (Oral)   Resp 20   Ht 160 cm (63\")   Wt 62.6 kg (138 lb)   LMP 07/17/2017   SpO2 100%   BMI 24.45 kg/m²     Physical Exam  Vitals and nursing note reviewed.   Constitutional:       General: She is not in acute distress.     Appearance: Normal appearance. She is not toxic-appearing.   HENT:      Head: Normocephalic and atraumatic.      Jaw: There is normal jaw occlusion.   Eyes:      General: Lids are normal.      Extraocular Movements: Extraocular movements intact.      Conjunctiva/sclera: Conjunctivae normal.      Pupils: Pupils are equal, round, and reactive to light.   Cardiovascular:      Rate and Rhythm: Normal rate and regular rhythm.      Pulses: Normal pulses.      Heart sounds: Normal heart sounds.   Pulmonary:      Effort: Pulmonary effort is normal. No respiratory distress.      Breath sounds: Normal breath sounds. No wheezing or rhonchi.   Abdominal:      General: Abdomen is flat.      Palpations: Abdomen is soft.      Tenderness: There is no abdominal tenderness. There is no guarding or rebound.   Musculoskeletal:         General: Normal range of motion.      Cervical back: Normal range of motion and neck supple.      Lumbar back: Tenderness present. No swelling or deformity. Positive right straight leg raise test.      Right hip: Tenderness present. No deformity.      Right upper leg: Tenderness present. No swelling or deformity.      Right lower leg: No edema.      Left lower leg: No edema.   Skin:     General: Skin is warm and dry.   Neurological:      Mental Status: She is alert and oriented to person, place, and time. " Mental status is at baseline.   Psychiatric:         Mood and Affect: Mood normal.             Procedures:  Procedures      Medical Decision Making:      Comorbidities that affect care:    Chronic back pain, Asthma, COPD    External Notes reviewed:    Previous Clinic Note: Urgent care visit from 12/31/2023 and Previous Radiological Studies: Previous x-ray of the lumbar spine from 12/31/2023 which showed mild narrowing of the L4-L5 disc space, 5 mm subluxation of L4 anteriorly over L5 likely degenerative      The following orders were placed and all results were independently analyzed by me:  Orders Placed This Encounter   Procedures    PT Consult: Eval & Treat Functional Mobility Below Baseline    PT Plan of Care Cert / Re-Cert       Medications Given in the Emergency Department:  Medications   ketorolac (TORADOL) injection 60 mg (60 mg Intramuscular Given 1/23/24 1313)   cyclobenzaprine (FLEXERIL) tablet 10 mg (10 mg Oral Given 1/23/24 1313)   methylPREDNISolone sodium succinate (SOLU-Medrol) injection 60 mg (60 mg Intramuscular Given 1/23/24 1337)        ED Course:         Labs:    Lab Results (last 24 hours)       ** No results found for the last 24 hours. **             Imaging:    No Radiology Exams Resulted Within Past 24 Hours      Differential Diagnosis and Discussion:    Back Pain: The patient presents with back pain. My differential diagnosis includes but is not limited to acute spinal epidural abscess, acute spinal epidural bleed, cauda equina syndrome, abdominal aortic aneurysm, aortic dissection, kidney stone, pyelonephritis, musculoskeletal back pain, spinal fracture, and osteoarthritis.         MDM               Patient Care Considerations:    I considered ordering a steroid pack for patient as a prescription at home, however she eloped from the ED.      Consultants/Shared Management Plan:    None    Social Determinants of Health:    Patient is independent, reliable, and has access to care.        Disposition and Care Coordination:    Eloped: This patient has left the emergency department or waiting room with no communication to myself, nursing or administrative staff. There was no opportunity to discuss the patient's decision to leave, provide medical advice or discuss alternatives to. The staff has made efforts to locate patient without success.        Final diagnoses:   Eloped from emergency department   Chronic right-sided low back pain with right-sided sciatica        ED Disposition       ED Disposition   Eloped    Condition   --    Comment   --               This medical record created using voice recognition software.             Cornelia Taylor, APRN  01/23/24 [id number]

## 2024-01-23 NOTE — ED NOTES
"Patient pushed call light requesting that a nurse come to the room, upon entering, \"My mom who is my only ride is here and has to go to work. I either need my papers or I am leaving.\" Before this nurse could explain the patient got up and left.   "

## 2024-01-23 NOTE — THERAPY EVALUATION
Patient Name: Terra Deutsch  : 1980    MRN: [id number]                              Today's Date: 2024       Admit Date: 2024    Visit Dx:     ICD-10-CM ICD-9-CM   1. Chronic bilateral low back pain with right-sided sciatica  M54.41 724.2    G89.29 724.3     338.29     Patient Active Problem List   Diagnosis    Lower abdominal pain    Pelvic pain    Herniated lumbar intervertebral disc    Spondylolisthesis of lumbar region    Spinal stenosis of lumbar region    Bandemia     Past Medical History:   Diagnosis Date    Abdominal pain     LEFT SIDE    Anxiety     Asthma     COPD (chronic obstructive pulmonary disease)     COPD (chronic obstructive pulmonary disease)     early signs    GERD (gastroesophageal reflux disease)     History of high blood pressure     NO MEDS SINCE 2017    History of transfusion     POST TUBALIGATION    Lower back pain     Lumbar herniated disc     Numbness     LEFT LEG    Panic attack     Sleep apnea     NO C-PAP IN USE    Ulcer 2017    ENDOSCOPY     Past Surgical History:   Procedure Laterality Date    COLONOSCOPY N/A 2017    - Internal hemorrhoids. Anal papilla(e) were hypertrophied One 4 mm polyp in the descending colon,One 4 mm polyp in the rectum, removed with a cold biopsy forceps. Resected and retrieved removed with a cold biopsy forceps.    D & C CERVICAL BIOPSY  2017    ENDOSCOPY N/A 2017    Procedure: ESOPHAGOGASTRODUODENOSCOPY with bx;  Surgeon: Cherrie Lazaro MD;  Location: [facility] ENDOSCOPY;  Service:     LUMBAR DISCECTOMY FUSION INSTRUMENTATION Left 2018    Procedure: L5-S1 discectomy and fusion with instrumentation;  Surgeon: Venkatesh Valero MD;  Location: [facility] MAIN OR;  Service: Neurosurgery    TOTAL LAPAROSCOPIC HYSTERECTOMY Bilateral 2017    Procedure: TOTAL LAPAROSCOPIC HYSTERECTOMY WITH DAVINCI ROBOT MATTI SALPINGECTOMY LEFT OOPHORECTOMY;  Surgeon: Julianna Saenz MD;  Location: [facility] MAIN OR;  Service:     TUBAL ABDOMINAL  LIGATION 2004      General Information       Row Name 01/23/24 1310          Physical Therapy Time and Intention    Document Type evaluation  -LR     Mode of Treatment individual therapy  -LR       Row Name 01/23/24 1310          General Information    Patient Profile Reviewed yes  -LR     Prior Level of Function independent:  -LR               User Key  (r) = Recorded By, (t) = Taken By, (c) = Cosigned By      Initials Name Provider Type    LR Kanchan Parra, PT Physical Therapist                  History: Patient states she has had increasing low back and right leg pain over the last 3 days.  She states she is not getting any sleep and is out of steroids to take to help with the pain.  She is also waiting to go see pain management.  Patient has pain in her right buttocks and feels like she is having cramping in her thigh.  She also states she has tingling that is happening in her foot and ankle on the right.  Her pain is currently a 10/10.  She reports a history of L5/S1 fusion 5 years ago.  She states she is only able to ambulate when she is bent over and is unable to stand up straight.  Patient has also tried heat and ice.    Objective:    Palpation: Tender to palpation at lumbar spinous processes and right piriformis    ROM:  Lumbar ROM:  Flexion: Patient position in lumbar flexion upon standing  Extension: 25%  L Side bending: NT  R Side bending: NT  L Rotation: 75%  R Rotation: 75%    Decreased right hip flexion ROM due to pain  Tightness in right piriformis  Bilateral knee and ankle ROM WNL  Left hip ROM WNL  Right sciatic nerve tension     Strength:  L Hip MMT:   R Hip MMT:  Flexion: 5/5  Flexion: 3-/5  Abduction: 5/5  Abduction: 4/5  Adduction: 5/5  Adduction: 4/5    L Knee MMT:  R Knee MMT:  Flexion: 5/5  Flexion: 4-/5  Extension: 5/5  Extension: 3-/5    L Ankle MMT:  R Ankle MMT:  DF: 5/5  DF: 4-/5  PF: 5/5   PF: 4+/5    Special Tests:  Quadrant Test: Positive  Slump Test: Positive on right, negative on  left  Straight Leg Raise Test: Positive on right, negative on left  Contralateral Straight Leg Raise Test: NT  Obers Test: NT     Sensation: Bilateral LE sensation intact to light touch    Assessment/Plan:   Pt presents with a diagnosis of low back pain and has decreased lumbar ROM, decreased right LE ROM, and right LE weakness that are limiting her ability to stand and walk.  The patient was educated in stretches for her right hip and lumbar region.  She was provided with a HEP handout.    Goals:   LTG 1: The patient will be independent in HEP in order to decrease pain and improve tolerance to functional activities.  STATUS: Met    Interventions:   Manual Therapy: Not performed    Therapeutic Exercises: HEP: Sciatic nerve glide in supine and sitting, piriformis stretch (2 way), SKTC, posterior pelvic tilt    Modalities: Not performed     Outcome Measures       Row Name 01/23/24 1310          Optimal Instrument    Optimal Instrument Optimal - 3  -LR     Bending/Stooping 3  -LR     Standing 3  -LR     Walking - short distance 3  -LR     From the list, choose the 3 activities you would most like to be able to do without any difficulty Standing;Bending/stooping;Walking -short distance  -LR     Total Score Optimal - 3 9  -LR       Row Name 01/23/24 1310          Functional Assessment    Outcome Measure Options Optimal Instrument  -LR               User Key  (r) = Recorded By, (t) = Taken By, (c) = Cosigned By      Initials Name Provider Type    Kanchan Castano, PT Physical Therapist                   Time Calculation:   PT Evaluation Complexity  History, PT Evaluation Complexity: 3 or more personal factors and/or comorbidities  Examination of Body Systems (PT Eval Complexity): 1-2 elements  Clinical Presentation (PT Evaluation Complexity): stable  Clinical Decision Making (PT Evaluation Complexity): low complexity  Overall Complexity (PT Evaluation Complexity): low complexity     PT Charges       Row Name 01/23/24  1310             Time Calculation    PT Received On 01/23/24  -LR         Timed Charges    19057 - PT Therapeutic Exercise Minutes 10  -LR         Untimed Charges    PT Eval/Re-eval Minutes 18  -LR         Total Minutes    Timed Charges Total Minutes 10  -LR      Untimed Charges Total Minutes 18  -LR       Total Minutes 28  -LR                User Key  (r) = Recorded By, (t) = Taken By, (c) = Cosigned By      Initials Name Provider Type    LR Kanchan Parra, PT Physical Therapist                  Therapy Charges for Today       Code Description Service Date Service Provider Modifiers Qty    75771954885 HC PT THER PROC EA 15 MIN 1/23/2024 Kanchan Parra, PT GP 1    69598790641 HC PT EVAL LOW COMPLEXITY 2 1/23/2024 Kanchan Parra, PT GP 1            PT G-Codes  Outcome Measure Options: Optimal Instrument       Kanchan Parra, PT  1/23/2024

## 2024-01-30 ENCOUNTER — APPOINTMENT (OUTPATIENT)
Dept: GENERAL RADIOLOGY | Facility: HOSPITAL | Age: 44
End: 2024-01-30
Payer: MEDICAID

## 2024-01-30 ENCOUNTER — HOSPITAL ENCOUNTER (EMERGENCY)
Facility: HOSPITAL | Age: 44
Discharge: HOME | End: 2024-01-30
Attending: EMERGENCY MEDICINE | Admitting: EMERGENCY MEDICINE
Payer: MEDICAID

## 2024-01-30 VITALS
SYSTOLIC BLOOD PRESSURE: 155 MMHG | HEART RATE: 86 BPM | RESPIRATION RATE: 18 BRPM | DIASTOLIC BLOOD PRESSURE: 92 MMHG | WEIGHT: 149.47 LBS | HEIGHT: 63 IN | TEMPERATURE: 98.3 F | OXYGEN SATURATION: 100 % | BODY MASS INDEX: 26.48 KG/M2

## 2024-01-30 DIAGNOSIS — M54.50 CHRONIC LOW BACK PAIN, UNSPECIFIED BACK PAIN LATERALITY, UNSPECIFIED WHETHER SCIATICA PRESENT: Primary | ICD-10-CM

## 2024-01-30 DIAGNOSIS — G89.29 CHRONIC LOW BACK PAIN, UNSPECIFIED BACK PAIN LATERALITY, UNSPECIFIED WHETHER SCIATICA PRESENT: Primary | ICD-10-CM

## 2024-01-30 PROCEDURE — 25010000002 DEXAMETHASONE SODIUM PHOSPHATE 10 MG/ML SOLUTION

## 2024-01-30 PROCEDURE — 99283 EMERGENCY DEPT VISIT LOW MDM: CPT

## 2024-01-30 PROCEDURE — 96372 THER/PROPH/DIAG INJ SC/IM: CPT

## 2024-01-30 PROCEDURE — 72100 X-RAY EXAM L-S SPINE 2/3 VWS: CPT

## 2024-01-30 PROCEDURE — 25010000002 KETOROLAC TROMETHAMINE PER 15 MG

## 2024-01-30 RX ORDER — METHOCARBAMOL 500 MG/1
500 TABLET, FILM COATED ORAL 4 TIMES DAILY
Qty: 20 TABLET | Refills: 0 | Status: SHIPPED | OUTPATIENT
Start: 2024-01-30

## 2024-01-30 RX ORDER — KETOROLAC TROMETHAMINE 10 MG/1
10 TABLET, FILM COATED ORAL EVERY 6 HOURS PRN
Qty: 20 TABLET | Refills: 0 | Status: SHIPPED | OUTPATIENT
Start: 2024-01-30

## 2024-01-30 RX ORDER — METHOCARBAMOL 750 MG/1
750 TABLET, FILM COATED ORAL ONCE
Status: COMPLETED | OUTPATIENT
Start: 2024-01-30 | End: 2024-01-30

## 2024-01-30 RX ORDER — KETOROLAC TROMETHAMINE 30 MG/ML
30 INJECTION, SOLUTION INTRAMUSCULAR; INTRAVENOUS ONCE
Status: COMPLETED | OUTPATIENT
Start: 2024-01-30 | End: 2024-01-30

## 2024-01-30 RX ORDER — DEXAMETHASONE SODIUM PHOSPHATE 10 MG/ML
10 INJECTION, SOLUTION INTRAMUSCULAR; INTRAVENOUS ONCE
Status: COMPLETED | OUTPATIENT
Start: 2024-01-30 | End: 2024-01-30

## 2024-01-30 RX ADMIN — KETOROLAC TROMETHAMINE 30 MG: 30 INJECTION, SOLUTION INTRAMUSCULAR; INTRAVENOUS at 20:58

## 2024-01-30 RX ADMIN — DEXAMETHASONE SODIUM PHOSPHATE 10 MG: 10 INJECTION INTRAMUSCULAR; INTRAVENOUS at 20:58

## 2024-01-30 RX ADMIN — METHOCARBAMOL TABLETS 750 MG: 750 TABLET, COATED ORAL at 20:58

## 2024-02-07 ENCOUNTER — PATIENT ROUNDING (BHMG ONLY) (OUTPATIENT)
Dept: URGENT CARE | Facility: CLINIC | Age: 44
End: 2024-02-07
Payer: MEDICAID

## 2024-02-07 NOTE — ED NOTES
Thank you for letting us care for you in your recent visit to our urgent care center. We would love to hear about your experience with us. Was this the first time you have visited our location?    We’re always looking for ways to make our patients’ experiences even better. Do you have any recommendations on ways we may improve?     I appreciate you taking the time to respond. Please be on the lookout for a survey about your recent visit from [employer] via text or email. We would greatly appreciate if you could fill that out and turn it back in. We want your voice to be heard and we value your feedback.   Thank you for choosing [facility] for your healthcare needs.

## 2024-05-31 PROCEDURE — 87086 URINE CULTURE/COLONY COUNT: CPT | Performed by: NURSE PRACTITIONER

## 2024-09-14 ENCOUNTER — HOSPITAL ENCOUNTER (EMERGENCY)
Facility: HOSPITAL | Age: 44
Discharge: HOME | End: 2024-09-14
Attending: EMERGENCY MEDICINE
Payer: MEDICAID

## 2024-09-14 VITALS
DIASTOLIC BLOOD PRESSURE: 69 MMHG | HEIGHT: 63 IN | BODY MASS INDEX: 23.05 KG/M2 | RESPIRATION RATE: 18 BRPM | OXYGEN SATURATION: 100 % | HEART RATE: 67 BPM | WEIGHT: 130.07 LBS | TEMPERATURE: 97.9 F | SYSTOLIC BLOOD PRESSURE: 142 MMHG
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
## 2024-09-14 DIAGNOSIS — M54.41 ACUTE RIGHT-SIDED LOW BACK PAIN WITH RIGHT-SIDED SCIATICA: ICD-10-CM

## 2024-09-14 DIAGNOSIS — E83.51 HYPOCALCEMIA: ICD-10-CM

## 2024-09-14 DIAGNOSIS — E87.6 HYPOKALEMIA: ICD-10-CM

## 2024-09-14 DIAGNOSIS — L03.114 CELLULITIS OF LEFT UPPER EXTREMITY: Primary | ICD-10-CM

## 2024-09-14 LAB
ALBUMIN SERPL-MCNC: 3.1 G/DL (ref 3.5–5.2)
ALBUMIN/GLOB SERPL: 1.1 G/DL
ALP SERPL-CCNC: 63 U/L (ref 39–117)
ALT SERPL W P-5'-P-CCNC: 8 U/L (ref 1–33)
ANION GAP SERPL CALCULATED.3IONS-SCNC: 8.3 MMOL/L (ref 5–15)
AST SERPL-CCNC: 15 U/L (ref 1–32)
BASOPHILS # BLD AUTO: 0.07 10*3/MM3 (ref 0–0.2)
BASOPHILS NFR BLD AUTO: 0.4 % (ref 0–1.5)
BILIRUB SERPL-MCNC: 0.2 MG/DL (ref 0–1.2)
BUN SERPL-MCNC: 11 MG/DL (ref 6–20)
BUN/CREAT SERPL: 19.6 (ref 7–25)
CALCIUM SPEC-SCNC: 7.8 MG/DL (ref 8.6–10.5)
CHLORIDE SERPL-SCNC: 107 MMOL/L (ref 98–107)
CO2 SERPL-SCNC: 22.7 MMOL/L (ref 22–29)
CREAT SERPL-MCNC: 0.56 MG/DL (ref 0.57–1)
CRP SERPL-MCNC: <0.3 MG/DL (ref 0–0.5)
DEPRECATED RDW RBC AUTO: 43.1 FL (ref 37–54)
EGFRCR SERPLBLD CKD-EPI 2021: 114.1 ML/MIN/1.73
EOSINOPHIL # BLD AUTO: 0.61 10*3/MM3 (ref 0–0.4)
EOSINOPHIL NFR BLD AUTO: 3.2 % (ref 0.3–6.2)
ERYTHROCYTE [DISTWIDTH] IN BLOOD BY AUTOMATED COUNT: 12.5 % (ref 12.3–15.4)
GLOBULIN UR ELPH-MCNC: 2.8 GM/DL
GLUCOSE SERPL-MCNC: 119 MG/DL (ref 65–99)
HCT VFR BLD AUTO: 37.6 % (ref 34–46.6)
HGB BLD-MCNC: 12.4 G/DL (ref 12–15.9)
HOLD SPECIMEN: NORMAL
HOLD SPECIMEN: NORMAL
IMM GRANULOCYTES # BLD AUTO: 0.07 10*3/MM3 (ref 0–0.05)
IMM GRANULOCYTES NFR BLD AUTO: 0.4 % (ref 0–0.5)
LYMPHOCYTES # BLD AUTO: 2.77 10*3/MM3 (ref 0.7–3.1)
LYMPHOCYTES NFR BLD AUTO: 14.6 % (ref 19.6–45.3)
MCH RBC QN AUTO: 30.8 PG (ref 26.6–33)
MCHC RBC AUTO-ENTMCNC: 33 G/DL (ref 31.5–35.7)
MCV RBC AUTO: 93.3 FL (ref 79–97)
MONOCYTES # BLD AUTO: 1.12 10*3/MM3 (ref 0.1–0.9)
MONOCYTES NFR BLD AUTO: 5.9 % (ref 5–12)
NEUTROPHILS NFR BLD AUTO: 14.37 10*3/MM3 (ref 1.7–7)
NEUTROPHILS NFR BLD AUTO: 75.5 % (ref 42.7–76)
NRBC BLD AUTO-RTO: 0 /100 WBC (ref 0–0.2)
PLATELET # BLD AUTO: 310 10*3/MM3 (ref 140–450)
PMV BLD AUTO: 9.1 FL (ref 6–12)
POTASSIUM SERPL-SCNC: 3.4 MMOL/L (ref 3.5–5.2)
PROT SERPL-MCNC: 5.9 G/DL (ref 6–8.5)
RBC # BLD AUTO: 4.03 10*6/MM3 (ref 3.77–5.28)
SODIUM SERPL-SCNC: 138 MMOL/L (ref 136–145)
WBC NRBC COR # BLD AUTO: 19.01 10*3/MM3 (ref 3.4–10.8)
WHOLE BLOOD HOLD COAG: NORMAL
WHOLE BLOOD HOLD SPECIMEN: NORMAL

## 2024-09-14 PROCEDURE — 25010000002 KETOROLAC TROMETHAMINE PER 15 MG

## 2024-09-14 PROCEDURE — 96374 THER/PROPH/DIAG INJ IV PUSH: CPT

## 2024-09-14 PROCEDURE — 99283 EMERGENCY DEPT VISIT LOW MDM: CPT

## 2024-09-14 PROCEDURE — 80053 COMPREHEN METABOLIC PANEL: CPT | Performed by: EMERGENCY MEDICINE

## 2024-09-14 PROCEDURE — 96375 TX/PRO/DX INJ NEW DRUG ADDON: CPT

## 2024-09-14 PROCEDURE — 85025 COMPLETE CBC W/AUTO DIFF WBC: CPT

## 2024-09-14 PROCEDURE — 86140 C-REACTIVE PROTEIN: CPT | Performed by: EMERGENCY MEDICINE

## 2024-09-14 PROCEDURE — 36415 COLL VENOUS BLD VENIPUNCTURE: CPT

## 2024-09-14 PROCEDURE — 25010000002 ONDANSETRON PER 1 MG

## 2024-09-14 PROCEDURE — 25010000002 MORPHINE PER 10 MG: Performed by: EMERGENCY MEDICINE

## 2024-09-14 RX ORDER — KETOROLAC TROMETHAMINE 10 MG/1
10 TABLET, FILM COATED ORAL EVERY 6 HOURS PRN
Qty: 12 TABLET | Refills: 0 | Status: ON HOLD | OUTPATIENT
Start: 2024-09-14 | End: 2024-09-18

## 2024-09-14 RX ORDER — SODIUM CHLORIDE 0.9 % (FLUSH) 0.9 %
10 SYRINGE (ML) INJECTION AS NEEDED
Status: DISCONTINUED | OUTPATIENT
Start: 2024-09-14 | End: 2024-09-14 | Stop reason: HOSPADM

## 2024-09-14 RX ORDER — KETOROLAC TROMETHAMINE 30 MG/ML
30 INJECTION, SOLUTION INTRAMUSCULAR; INTRAVENOUS ONCE
Status: COMPLETED | OUTPATIENT
Start: 2024-09-14 | End: 2024-09-14

## 2024-09-14 RX ORDER — ONDANSETRON 4 MG/1
4 TABLET, ORALLY DISINTEGRATING ORAL EVERY 8 HOURS PRN
Qty: 15 TABLET | Refills: 0 | Status: SHIPPED | OUTPATIENT
Start: 2024-09-14

## 2024-09-14 RX ORDER — ONDANSETRON 2 MG/ML
4 INJECTION INTRAMUSCULAR; INTRAVENOUS ONCE
Status: COMPLETED | OUTPATIENT
Start: 2024-09-14 | End: 2024-09-14

## 2024-09-14 RX ORDER — METHYLPREDNISOLONE 4 MG
TABLET, DOSE PACK ORAL
Qty: 21 TABLET | Refills: 0 | Status: SHIPPED | OUTPATIENT
Start: 2024-09-14 | End: 2024-09-18 | Stop reason: HOSPADM

## 2024-09-14 RX ORDER — CEPHALEXIN 500 MG/1
500 CAPSULE ORAL 4 TIMES DAILY
Qty: 19 CAPSULE | Refills: 0 | Status: ON HOLD | OUTPATIENT
Start: 2024-09-14 | End: 2024-09-18

## 2024-09-14 RX ADMIN — MORPHINE SULFATE 4 MG: 4 INJECTION, SOLUTION INTRAMUSCULAR; INTRAVENOUS at 19:03

## 2024-09-14 RX ADMIN — KETOROLAC TROMETHAMINE 30 MG: 30 INJECTION, SOLUTION INTRAMUSCULAR; INTRAVENOUS at 19:02

## 2024-09-14 RX ADMIN — CEPHALEXIN 500 MG: 250 CAPSULE ORAL at 19:03

## 2024-09-14 RX ADMIN — ONDANSETRON 4 MG: 2 INJECTION INTRAMUSCULAR; INTRAVENOUS at 19:33

## 2024-09-14 NOTE — ED PROVIDER NOTES
Time: 6:33 PM EDT  Date of encounter:  9/14/2024  Independent Historian/Clinical History and Information was obtained by:   Patient    History is limited by: N/A    Chief Complaint: Spider bite      History of Present Illness:  Patient is a 44 y.o. year old female who presents to the emergency department for evaluation of spider bite.  Patient states that yesterday morning she was helping her neighbor clean out their house that they are renovating.  Patient also states that she was working outside yesterday.      Patient Care Team  Primary Care Provider: Rich Flores MD    Past Medical History:     Allergies   Allergen Reactions    Pyridium [Phenazopyridine Hcl] Other (See Comments)     Pt unsure of reaction     Bactrim [Sulfamethoxazole-Trimethoprim] Palpitations    Penicillins Palpitations     Past Medical History:   Diagnosis Date    Abdominal pain     LEFT SIDE    Anxiety     Asthma     COPD (chronic obstructive pulmonary disease)     COPD (chronic obstructive pulmonary disease)     early signs    GERD (gastroesophageal reflux disease)     History of high blood pressure     NO MEDS SINCE 7/2017    History of transfusion     POST TUBALIGATION    Lower back pain     Lumbar herniated disc     Numbness     LEFT LEG    Panic attack     Sleep apnea     NO C-PAP IN USE    Ulcer 2017    ENDOSCOPY     Past Surgical History:   Procedure Laterality Date    COLONOSCOPY N/A 7/12/2017    - Internal hemorrhoids. Anal papilla(e) were hypertrophied One 4 mm polyp in the descending colon,One 4 mm polyp in the rectum, removed with a cold biopsy forceps. Resected and retrieved removed with a cold biopsy forceps.    D & C CERVICAL BIOPSY  2017    ENDOSCOPY N/A 7/12/2017    Procedure: ESOPHAGOGASTRODUODENOSCOPY with bx;  Surgeon: Cherrie Lazaro MD;  Location: [facility] ENDOSCOPY;  Service:     LUMBAR DISCECTOMY FUSION INSTRUMENTATION Left 4/2/2018    Procedure: L5-S1 discectomy and fusion with instrumentation;  Surgeon:  "Venkatesh Valero MD;  Location: [facility] OR;  Service: Neurosurgery    TOTAL LAPAROSCOPIC HYSTERECTOMY Bilateral 2017    Procedure: TOTAL LAPAROSCOPIC HYSTERECTOMY WITH DAVINCI ROBOT MATTI SALPINGECTOMY LEFT OOPHORECTOMY;  Surgeon: Julianna Saenz MD;  Location: [facility];  Service:     TUBAL ABDOMINAL LIGATION       Family History   Problem Relation Age of Onset    Heart disease Mother     Hypertension Mother     Heart disease Sister     Malig Hyperthermia Neg Hx        Home Medications:  Prior to Admission medications    Medication Sig Start Date End Date Taking? Authorizing Provider   Loratadine-D 12HR 5-120 MG per 12 hr tablet TAKE 1 TABLET BY MOUTH EVERY 12 HOURS AS NEEDED FOR ALLERGIES 24   Provider, MD Timbo   ibuprofen (ADVIL,MOTRIN) 600 MG tablet Take 1 tablet by mouth Every 8 (Eight) Hours As Needed for Moderate Pain. 24  Tiffany Newsome APRN   ondansetron ODT (ZOFRAN-ODT) 4 MG disintegrating tablet Place 1 tablet on the tongue Every 8 (Eight) Hours As Needed for Vomiting or Nausea. 24  Tiffany Newsome APRN        Social History:   Social History     Tobacco Use    Smoking status: Every Day     Current packs/day: 0.00     Average packs/day: 1 pack/day for 18.2 years (18.2 ttl pk-yrs)     Types: Cigarettes     Start date:      Last attempt to quit: 3/27/2018     Years since quittin.4    Smokeless tobacco: Never   Vaping Use    Vaping status: Never Used   Substance Use Topics    Alcohol use: No    Drug use: No         Review of Systems:  Review of Systems   Skin:  Positive for wound.        Physical Exam:  /69 (BP Location: Right arm, Patient Position: Lying)   Pulse 67   Temp 97.9 °F (36.6 °C) (Oral)   Resp 18   Ht 160 cm (63\")   Wt 59 kg (130 lb 1.1 oz)   LMP 2017   SpO2 100%   BMI 23.04 kg/m²     Physical Exam  Vitals reviewed.   Constitutional:       Appearance: Normal appearance.   Cardiovascular:      " Rate and Rhythm: Normal rate and regular rhythm.      Heart sounds: Normal heart sounds.   Pulmonary:      Effort: Pulmonary effort is normal.      Breath sounds: Normal breath sounds.   Musculoskeletal:      Left elbow: Swelling present.   Skin:     General: Skin is warm and dry.      Findings: Erythema (Area of solid cellulitic skin change on the left elbow that is circumoral and radiates down towards the wrist but does not cross over.) present.   Psychiatric:         Behavior: Behavior is cooperative.                Procedures:  Procedures      Medical Decision Making:      Comorbidities that affect care:    Asthma, COPD, Hypertension, Smoking    External Notes reviewed:    None      The following orders were placed and all results were independently analyzed by me:  Orders Placed This Encounter   Procedures    [place] Draw    CBC Auto Differential    C-reactive Protein    Comprehensive Metabolic Panel    Insert peripheral IV    CBC & Differential    Green Top (Gel)    Lavender Top    Gold Top - SST    Light Blue Top       Medications Given in the Emergency Department:  Medications   sodium chloride 0.9 % flush 10 mL (has no administration in time range)   cephalexin (KEFLEX) capsule 500 mg (500 mg Oral Given 9/14/24 1903)   morphine injection 4 mg (4 mg Intravenous Given 9/14/24 1903)   ketorolac (TORADOL) injection 30 mg (30 mg Intravenous Given 9/14/24 1902)   ondansetron (ZOFRAN) injection 4 mg (4 mg Intravenous Given 9/14/24 1933)        ED Course:         Labs:    Lab Results (last 24 hours)       Procedure Component Value Units Date/Time    CBC & Differential [[id number]]  (Abnormal) Collected: 09/14/24 1733    Specimen: Blood Updated: 09/14/24 1741    Narrative:      The following orders were created for panel order CBC & Differential.  Procedure                               Abnormality         Status                     ---------                               -----------         ------                      CBC Auto Differential[[id number]]        Abnormal            Final result                 Please view results for these tests on the individual orders.    CBC Auto Differential [[id number]]  (Abnormal) Collected: 09/14/24 1733    Specimen: Blood Updated: 09/14/24 1741     WBC 19.01 10*3/mm3      RBC 4.03 10*6/mm3      Hemoglobin 12.4 g/dL      Hematocrit 37.6 %      MCV 93.3 fL      MCH 30.8 pg      MCHC 33.0 g/dL      RDW 12.5 %      RDW-SD 43.1 fl      MPV 9.1 fL      Platelets 310 10*3/mm3      Neutrophil % 75.5 %      Lymphocyte % 14.6 %      Monocyte % 5.9 %      Eosinophil % 3.2 %      Basophil % 0.4 %      Immature Grans % 0.4 %      Neutrophils, Absolute 14.37 10*3/mm3      Lymphocytes, Absolute 2.77 10*3/mm3      Monocytes, Absolute 1.12 10*3/mm3      Eosinophils, Absolute 0.61 10*3/mm3      Basophils, Absolute 0.07 10*3/mm3      Immature Grans, Absolute 0.07 10*3/mm3      nRBC 0.0 /100 WBC     C-reactive Protein [[id number]]  (Normal) Collected: 09/14/24 1733    Specimen: Blood Updated: 09/14/24 1849     C-Reactive Protein <0.30 mg/dL     Comprehensive Metabolic Panel [[id number]]  (Abnormal) Collected: 09/14/24 1936    Specimen: Blood Updated: 09/14/24 2006     Glucose 119 mg/dL      BUN 11 mg/dL      Creatinine 0.56 mg/dL      Sodium 138 mmol/L      Potassium 3.4 mmol/L      Comment: Slight hemolysis detected by analyzer. Result may be falsely elevated.        Chloride 107 mmol/L      CO2 22.7 mmol/L      Calcium 7.8 mg/dL      Total Protein 5.9 g/dL      Albumin 3.1 g/dL      ALT (SGPT) 8 U/L      AST (SGOT) 15 U/L      Comment: Slight hemolysis detected by analyzer. Result may be falsely elevated.        Alkaline Phosphatase 63 U/L      Total Bilirubin 0.2 mg/dL      Globulin 2.8 gm/dL      A/G Ratio 1.1 g/dL      BUN/Creatinine Ratio 19.6     Anion Gap 8.3 mmol/L      eGFR 114.1 mL/min/1.73     Narrative:      GFR Normal >60  Chronic Kidney Disease <60  Kidney Failure <15               Imaging:    No  Radiology Exams Resulted Within Past 24 Hours      Differential Diagnosis and Discussion:    Abscess: Differential diagnosis for an abscess includes but is not limited to bacterial or fungal infections, foreign body reactions, malignancies, and autoimmune or inflammatory conditions.    All labs were reviewed and interpreted by me.    MDM  Number of Diagnoses or Management Options  Acute right-sided low back pain with right-sided sciatica  Bug bite, initial encounter  Cellulitis of left upper extremity  Hypocalcemia  Hypokalemia  Diagnosis management comments: The patient has erythema and pain consistent with cellulitis. The area of erythema was demarcated in the ED. The patient has no fluctuance or induration consistent with abscess formation. The patient was started on antibiotics in the Emergency Department. The patient is resting comfortably and feels better, is alert and in no distress. On re-examination the patient does not appear toxic and has no meningeal signs, and there's no intractable vomiting or respiratory distress. Based on the history, exam, diagnostic testing and reassessment, the patient has no signs of sepsis.  The patient's vital signs have been stable. The patient's condition is stable and is appropriate for discharge. The patient will pursue further outpatient evaluation with the primary care physician or other designated or consultant physician as indicated in the discharge instructions. The patient was counseled that a repeat examination within two days is imperative. This can be done as an outpatient. Patient advised to return to the ED if outpatient evaluation is not feasible. The patient was counseled to return to the ER sooner for worsening of erythema or spread outside of the demarcated lines. The patient was also counseled to return for worsening fever, chills, altered mental status, intractable vomiting or any other symptoms of concern.       Amount and/or Complexity of Data  Reviewed  Clinical lab tests: reviewed  Decide to obtain previous medical records or to obtain history from someone other than the patient: yes         Patient Care Considerations:    SEPSIS was considered but is NOT present in the emergency department as SIRS criteria is not present.      Consultants/Shared Management Plan:    None    Social Determinants of Health:    Patient is independent, reliable, and has access to care.       Disposition and Care Coordination:    Discharged: The patient is suitable and stable for discharge with no need for consideration of admission.    I have explained the patient´s condition, diagnoses and treatment plan based on the information available to me at this time. I have answered questions and addressed any concerns. The patient has a good  understanding of the patient´s diagnosis, condition, and treatment plan as can be expected at this point. The vital signs have been stable. The patient´s condition is stable and appropriate for discharge from the emergency department.      The patient will pursue further outpatient evaluation with the primary care physician or other designated or consulting physician as outlined in the discharge instructions. They are agreeable to this plan of care and follow-up instructions have been explained in detail. The patient has received these instructions in written format and has expressed an understanding of the discharge instructions. The patient is aware that any significant change in condition or worsening of symptoms should prompt an immediate return to this or the closest emergency department or call to 911.  I have explained discharge medications and the need for follow up with the patient/caretakers. This was also printed in the discharge instructions. Patient was discharged with the following medications and follow up:      Medication List        New Prescriptions      cephalexin 500 MG capsule  Commonly known as: KEFLEX  Take 1 capsule by  mouth 4 (Four) Times a Day for 5 days.     ketorolac 10 MG tablet  Commonly known as: TORADOL  Take 1 tablet by mouth Every 6 (Six) Hours As Needed for Moderate Pain for up to 3 days.     methylPREDNISolone 4 MG dose pack  Commonly known as: MEDROL  Take as directed on package instructions.     ondansetron ODT 4 MG disintegrating tablet  Commonly known as: ZOFRAN-ODT  Place 1 tablet on the tongue Every 8 (Eight) Hours As Needed for Nausea or Vomiting.               Where to Get Your Medications        These medications were sent to [facility]/pharmacy #[id number] - [place], [place] - [address] - [phone]  - [phone]   [address] [place] [address]      Hours: 24-hours Phone: [phone]   cephalexin 500 MG capsule  ketorolac 10 MG tablet  methylPREDNISolone 4 MG dose pack  ondansetron ODT 4 MG disintegrating tablet      Rich Flores MD  [address]  [address] [place], [place] [id number] [phone]             Final diagnoses:   Cellulitis of left upper extremity   Acute right-sided low back pain with right-sided sciatica   Bug bite, initial encounter   Hypocalcemia   Hypokalemia        ED Disposition       ED Disposition   Discharge    Condition   Stable    Comment   --               This medical record created using voice recognition software.             Seaver, Alyce B, APRN  09/14/24 2028

## 2024-09-15 ENCOUNTER — APPOINTMENT (OUTPATIENT)
Dept: GENERAL RADIOLOGY | Facility: HOSPITAL | Age: 44
End: 2024-09-15
Payer: MEDICAID

## 2024-09-15 ENCOUNTER — HOSPITAL ENCOUNTER (OUTPATIENT)
Facility: HOSPITAL | Age: 44
Setting detail: OBSERVATION
Discharge: HOME | End: 2024-09-18
Attending: EMERGENCY MEDICINE | Admitting: INTERNAL MEDICINE
Payer: MEDICAID

## 2024-09-15 DIAGNOSIS — L03.114 CELLULITIS OF LEFT UPPER EXTREMITY: Primary | ICD-10-CM

## 2024-09-15 PROBLEM — L03.90 CELLULITIS: Status: ACTIVE | Noted: 2024-09-15

## 2024-09-15 LAB
ALBUMIN SERPL-MCNC: 3.1 G/DL (ref 3.5–5.2)
ALBUMIN/GLOB SERPL: 1 G/DL
ALP SERPL-CCNC: 60 U/L (ref 39–117)
ALT SERPL W P-5'-P-CCNC: 9 U/L (ref 1–33)
ANION GAP SERPL CALCULATED.3IONS-SCNC: 6.1 MMOL/L (ref 5–15)
AST SERPL-CCNC: 11 U/L (ref 1–32)
BASOPHILS # BLD AUTO: 0.06 10*3/MM3 (ref 0–0.2)
BASOPHILS NFR BLD AUTO: 0.4 % (ref 0–1.5)
BILIRUB SERPL-MCNC: <0.2 MG/DL (ref 0–1.2)
BUN SERPL-MCNC: 11 MG/DL (ref 6–20)
BUN/CREAT SERPL: 21.2 (ref 7–25)
CALCIUM SPEC-SCNC: 8.1 MG/DL (ref 8.6–10.5)
CHLORIDE SERPL-SCNC: 102 MMOL/L (ref 98–107)
CK SERPL-CCNC: 51 U/L (ref 20–180)
CO2 SERPL-SCNC: 25.9 MMOL/L (ref 22–29)
CREAT SERPL-MCNC: 0.52 MG/DL (ref 0.57–1)
D-LACTATE SERPL-SCNC: 0.6 MMOL/L (ref 0.5–2)
DEPRECATED RDW RBC AUTO: 43.9 FL (ref 37–54)
EGFRCR SERPLBLD CKD-EPI 2021: 116.2 ML/MIN/1.73
EOSINOPHIL # BLD AUTO: 0.43 10*3/MM3 (ref 0–0.4)
EOSINOPHIL NFR BLD AUTO: 2.6 % (ref 0.3–6.2)
ERYTHROCYTE [DISTWIDTH] IN BLOOD BY AUTOMATED COUNT: 12.6 % (ref 12.3–15.4)
ERYTHROCYTE [SEDIMENTATION RATE] IN BLOOD: 14 MM/HR (ref 0–20)
GLOBULIN UR ELPH-MCNC: 3 GM/DL
GLUCOSE SERPL-MCNC: 83 MG/DL (ref 65–99)
HCT VFR BLD AUTO: 34.3 % (ref 34–46.6)
HGB BLD-MCNC: 11.3 G/DL (ref 12–15.9)
HOLD SPECIMEN: NORMAL
IMM GRANULOCYTES # BLD AUTO: 0.07 10*3/MM3 (ref 0–0.05)
IMM GRANULOCYTES NFR BLD AUTO: 0.4 % (ref 0–0.5)
LYMPHOCYTES # BLD AUTO: 3.33 10*3/MM3 (ref 0.7–3.1)
LYMPHOCYTES NFR BLD AUTO: 20.5 % (ref 19.6–45.3)
MCH RBC QN AUTO: 31.1 PG (ref 26.6–33)
MCHC RBC AUTO-ENTMCNC: 32.9 G/DL (ref 31.5–35.7)
MCV RBC AUTO: 94.5 FL (ref 79–97)
MONOCYTES # BLD AUTO: 0.9 10*3/MM3 (ref 0.1–0.9)
MONOCYTES NFR BLD AUTO: 5.5 % (ref 5–12)
NEUTROPHILS NFR BLD AUTO: 11.49 10*3/MM3 (ref 1.7–7)
NEUTROPHILS NFR BLD AUTO: 70.6 % (ref 42.7–76)
NRBC BLD AUTO-RTO: 0 /100 WBC (ref 0–0.2)
PLATELET # BLD AUTO: 317 10*3/MM3 (ref 140–450)
PMV BLD AUTO: 9.2 FL (ref 6–12)
POTASSIUM SERPL-SCNC: 3.6 MMOL/L (ref 3.5–5.2)
PROT SERPL-MCNC: 6.1 G/DL (ref 6–8.5)
RBC # BLD AUTO: 3.63 10*6/MM3 (ref 3.77–5.28)
SODIUM SERPL-SCNC: 134 MMOL/L (ref 136–145)
WBC NRBC COR # BLD AUTO: 16.28 10*3/MM3 (ref 3.4–10.8)

## 2024-09-15 PROCEDURE — G0378 HOSPITAL OBSERVATION PER HR: HCPCS

## 2024-09-15 PROCEDURE — 25010000002 MORPHINE PER 10 MG: Performed by: STUDENT IN AN ORGANIZED HEALTH CARE EDUCATION/TRAINING PROGRAM

## 2024-09-15 PROCEDURE — 25810000003 LACTATED RINGERS PER 1000 ML: Performed by: STUDENT IN AN ORGANIZED HEALTH CARE EDUCATION/TRAINING PROGRAM

## 2024-09-15 PROCEDURE — 85025 COMPLETE CBC W/AUTO DIFF WBC: CPT

## 2024-09-15 PROCEDURE — 73090 X-RAY EXAM OF FOREARM: CPT

## 2024-09-15 PROCEDURE — 25010000002 AMPICILLIN-SULBACTAM PER 1.5 G

## 2024-09-15 PROCEDURE — 96376 TX/PRO/DX INJ SAME DRUG ADON: CPT

## 2024-09-15 PROCEDURE — 25010000002 ONDANSETRON PER 1 MG: Performed by: STUDENT IN AN ORGANIZED HEALTH CARE EDUCATION/TRAINING PROGRAM

## 2024-09-15 PROCEDURE — 25010000002 ONDANSETRON PER 1 MG: Performed by: EMERGENCY MEDICINE

## 2024-09-15 PROCEDURE — 25010000002 VANCOMYCIN 5 G RECONSTITUTED SOLUTION: Performed by: STUDENT IN AN ORGANIZED HEALTH CARE EDUCATION/TRAINING PROGRAM

## 2024-09-15 PROCEDURE — 96365 THER/PROPH/DIAG IV INF INIT: CPT

## 2024-09-15 PROCEDURE — 82550 ASSAY OF CK (CPK): CPT | Performed by: STUDENT IN AN ORGANIZED HEALTH CARE EDUCATION/TRAINING PROGRAM

## 2024-09-15 PROCEDURE — 25810000003 SODIUM CHLORIDE 0.9 % SOLUTION: Performed by: STUDENT IN AN ORGANIZED HEALTH CARE EDUCATION/TRAINING PROGRAM

## 2024-09-15 PROCEDURE — 87040 BLOOD CULTURE FOR BACTERIA: CPT

## 2024-09-15 PROCEDURE — 25010000002 MORPHINE PER 10 MG: Performed by: EMERGENCY MEDICINE

## 2024-09-15 PROCEDURE — 36415 COLL VENOUS BLD VENIPUNCTURE: CPT

## 2024-09-15 PROCEDURE — 96375 TX/PRO/DX INJ NEW DRUG ADDON: CPT

## 2024-09-15 PROCEDURE — 99223 1ST HOSP IP/OBS HIGH 75: CPT | Performed by: STUDENT IN AN ORGANIZED HEALTH CARE EDUCATION/TRAINING PROGRAM

## 2024-09-15 PROCEDURE — 80053 COMPREHEN METABOLIC PANEL: CPT

## 2024-09-15 PROCEDURE — 99285 EMERGENCY DEPT VISIT HI MDM: CPT

## 2024-09-15 PROCEDURE — 83605 ASSAY OF LACTIC ACID: CPT

## 2024-09-15 PROCEDURE — 85652 RBC SED RATE AUTOMATED: CPT

## 2024-09-15 RX ORDER — HYDRALAZINE HYDROCHLORIDE 20 MG/ML
10 INJECTION INTRAMUSCULAR; INTRAVENOUS EVERY 6 HOURS PRN
Status: DISCONTINUED | OUTPATIENT
Start: 2024-09-15 | End: 2024-09-18 | Stop reason: HOSPADM

## 2024-09-15 RX ORDER — SODIUM CHLORIDE 0.9 % (FLUSH) 0.9 %
10 SYRINGE (ML) INJECTION EVERY 12 HOURS SCHEDULED
Status: DISCONTINUED | OUTPATIENT
Start: 2024-09-15 | End: 2024-09-18 | Stop reason: HOSPADM

## 2024-09-15 RX ORDER — ACETAMINOPHEN 325 MG/1
650 TABLET ORAL EVERY 6 HOURS PRN
Status: DISCONTINUED | OUTPATIENT
Start: 2024-09-15 | End: 2024-09-18 | Stop reason: HOSPADM

## 2024-09-15 RX ORDER — POLYETHYLENE GLYCOL 3350 17 G/17G
17 POWDER, FOR SOLUTION ORAL DAILY PRN
Status: DISCONTINUED | OUTPATIENT
Start: 2024-09-15 | End: 2024-09-18 | Stop reason: HOSPADM

## 2024-09-15 RX ORDER — AMOXICILLIN 250 MG
2 CAPSULE ORAL 2 TIMES DAILY PRN
Status: DISCONTINUED | OUTPATIENT
Start: 2024-09-15 | End: 2024-09-18 | Stop reason: HOSPADM

## 2024-09-15 RX ORDER — VANCOMYCIN/0.9 % SOD CHLORIDE 1.5G/250ML
20 PLASTIC BAG, INJECTION (ML) INTRAVENOUS ONCE
Status: COMPLETED | OUTPATIENT
Start: 2024-09-15 | End: 2024-09-15

## 2024-09-15 RX ORDER — IPRATROPIUM BROMIDE AND ALBUTEROL SULFATE 2.5; .5 MG/3ML; MG/3ML
3 SOLUTION RESPIRATORY (INHALATION) EVERY 6 HOURS PRN
Status: DISCONTINUED | OUTPATIENT
Start: 2024-09-15 | End: 2024-09-18 | Stop reason: HOSPADM

## 2024-09-15 RX ORDER — SODIUM CHLORIDE 0.9 % (FLUSH) 0.9 %
10 SYRINGE (ML) INJECTION AS NEEDED
Status: DISCONTINUED | OUTPATIENT
Start: 2024-09-15 | End: 2024-09-18 | Stop reason: HOSPADM

## 2024-09-15 RX ORDER — SODIUM CHLORIDE 9 MG/ML
40 INJECTION, SOLUTION INTRAVENOUS AS NEEDED
Status: DISCONTINUED | OUTPATIENT
Start: 2024-09-15 | End: 2024-09-18 | Stop reason: HOSPADM

## 2024-09-15 RX ORDER — BISACODYL 5 MG/1
5 TABLET, DELAYED RELEASE ORAL DAILY PRN
Status: DISCONTINUED | OUTPATIENT
Start: 2024-09-15 | End: 2024-09-18 | Stop reason: HOSPADM

## 2024-09-15 RX ORDER — ONDANSETRON 2 MG/ML
4 INJECTION INTRAMUSCULAR; INTRAVENOUS ONCE
Status: COMPLETED | OUTPATIENT
Start: 2024-09-15 | End: 2024-09-15

## 2024-09-15 RX ORDER — OXYCODONE HYDROCHLORIDE 5 MG/1
5 TABLET ORAL EVERY 4 HOURS PRN
Status: DISCONTINUED | OUTPATIENT
Start: 2024-09-15 | End: 2024-09-18

## 2024-09-15 RX ORDER — BISACODYL 10 MG
10 SUPPOSITORY, RECTAL RECTAL DAILY PRN
Status: DISCONTINUED | OUTPATIENT
Start: 2024-09-15 | End: 2024-09-18 | Stop reason: HOSPADM

## 2024-09-15 RX ORDER — ONDANSETRON 2 MG/ML
4 INJECTION INTRAMUSCULAR; INTRAVENOUS EVERY 6 HOURS PRN
Status: DISCONTINUED | OUTPATIENT
Start: 2024-09-15 | End: 2024-09-18 | Stop reason: HOSPADM

## 2024-09-15 RX ORDER — OXYCODONE AND ACETAMINOPHEN 5; 325 MG/1; MG/1
1 TABLET ORAL ONCE
Status: COMPLETED | OUTPATIENT
Start: 2024-09-15 | End: 2024-09-15

## 2024-09-15 RX ORDER — SODIUM CHLORIDE, SODIUM LACTATE, POTASSIUM CHLORIDE, CALCIUM CHLORIDE 600; 310; 30; 20 MG/100ML; MG/100ML; MG/100ML; MG/100ML
75 INJECTION, SOLUTION INTRAVENOUS CONTINUOUS
Status: DISCONTINUED | OUTPATIENT
Start: 2024-09-15 | End: 2024-09-16

## 2024-09-15 RX ORDER — MORPHINE SULFATE 2 MG/ML
2 INJECTION, SOLUTION INTRAMUSCULAR; INTRAVENOUS EVERY 4 HOURS PRN
Status: DISCONTINUED | OUTPATIENT
Start: 2024-09-15 | End: 2024-09-18 | Stop reason: HOSPADM

## 2024-09-15 RX ADMIN — Medication 10 ML: at 22:14

## 2024-09-15 RX ADMIN — MORPHINE SULFATE 2 MG: 2 INJECTION, SOLUTION INTRAMUSCULAR; INTRAVENOUS at 22:13

## 2024-09-15 RX ADMIN — AMPICILLIN AND SULBACTAM 3 G: 2; 1 INJECTION, POWDER, FOR SOLUTION INTRAVENOUS at 19:48

## 2024-09-15 RX ADMIN — VANCOMYCIN HYDROCHLORIDE 1500 MG: 5 INJECTION, POWDER, LYOPHILIZED, FOR SOLUTION INTRAVENOUS at 22:13

## 2024-09-15 RX ADMIN — MORPHINE SULFATE 4 MG: 4 INJECTION, SOLUTION INTRAMUSCULAR; INTRAVENOUS at 19:15

## 2024-09-15 RX ADMIN — ONDANSETRON 4 MG: 2 INJECTION INTRAMUSCULAR; INTRAVENOUS at 22:20

## 2024-09-15 RX ADMIN — SODIUM CHLORIDE, POTASSIUM CHLORIDE, SODIUM LACTATE AND CALCIUM CHLORIDE 75 ML/HR: 600; 310; 30; 20 INJECTION, SOLUTION INTRAVENOUS at 22:13

## 2024-09-15 RX ADMIN — ONDANSETRON 4 MG: 2 INJECTION INTRAMUSCULAR; INTRAVENOUS at 19:22

## 2024-09-15 RX ADMIN — OXYCODONE HYDROCHLORIDE AND ACETAMINOPHEN 1 TABLET: 5; 325 TABLET ORAL at 20:41

## 2024-09-15 NOTE — DISCHARGE INSTRUCTIONS
Follow-up with your primary care provider in 72 hours to ensure wound is healing.  Return to ER if symptoms worsen or fail to improve.    You are being sent home with an antibiotic, Keflex.  You received the first dose of this medication in the emergency department and need a second dose this evening.  And then continue taking 4 times daily for the next 5 days.  You are being sent home with an anti-inflammatory, Toradol.  Take every 6 hours as needed for the next 3 days.  Do not take ibuprofen while also taking Toradol however you may take Tylenol but do not exceed 4 g in 24 hours.  After course of Toradol you may begin taking ibuprofen again but do not exceed 2400 mg in 24 hours.  You are also being sent home with a steroid Dosepak, follow package directions.  You are being sent home with Zofran for nausea, take every 8 hours as needed.    Begin taking daily calcium, vitamin D, and potassium and have labs rechecked by primary care provider.

## 2024-09-16 PROBLEM — L03.114 CELLULITIS OF LEFT UPPER EXTREMITY: Status: ACTIVE | Noted: 2024-09-16

## 2024-09-16 LAB
AMPHET+METHAMPHET UR QL: POSITIVE
ANION GAP SERPL CALCULATED.3IONS-SCNC: 7.3 MMOL/L (ref 5–15)
BARBITURATES UR QL SCN: NEGATIVE
BASOPHILS # BLD AUTO: 0.07 10*3/MM3 (ref 0–0.2)
BASOPHILS NFR BLD AUTO: 0.5 % (ref 0–1.5)
BENZODIAZ UR QL SCN: NEGATIVE
BUN SERPL-MCNC: 9 MG/DL (ref 6–20)
BUN/CREAT SERPL: 15.8 (ref 7–25)
CALCIUM SPEC-SCNC: 8.3 MG/DL (ref 8.6–10.5)
CANNABINOIDS SERPL QL: NEGATIVE
CHLORIDE SERPL-SCNC: 104 MMOL/L (ref 98–107)
CO2 SERPL-SCNC: 22.7 MMOL/L (ref 22–29)
COCAINE UR QL: NEGATIVE
CREAT SERPL-MCNC: 0.57 MG/DL (ref 0.57–1)
DEPRECATED RDW RBC AUTO: 44.6 FL (ref 37–54)
EGFRCR SERPLBLD CKD-EPI 2021: 113.7 ML/MIN/1.73
EOSINOPHIL # BLD AUTO: 0.46 10*3/MM3 (ref 0–0.4)
EOSINOPHIL NFR BLD AUTO: 3 % (ref 0.3–6.2)
ERYTHROCYTE [DISTWIDTH] IN BLOOD BY AUTOMATED COUNT: 12.6 % (ref 12.3–15.4)
FENTANYL UR-MCNC: NEGATIVE NG/ML
GLUCOSE SERPL-MCNC: 89 MG/DL (ref 65–99)
HCT VFR BLD AUTO: 36.6 % (ref 34–46.6)
HGB BLD-MCNC: 11.6 G/DL (ref 12–15.9)
IMM GRANULOCYTES # BLD AUTO: 0.05 10*3/MM3 (ref 0–0.05)
IMM GRANULOCYTES NFR BLD AUTO: 0.3 % (ref 0–0.5)
LYMPHOCYTES # BLD AUTO: 3.4 10*3/MM3 (ref 0.7–3.1)
LYMPHOCYTES NFR BLD AUTO: 22.5 % (ref 19.6–45.3)
MCH RBC QN AUTO: 30.4 PG (ref 26.6–33)
MCHC RBC AUTO-ENTMCNC: 31.7 G/DL (ref 31.5–35.7)
MCV RBC AUTO: 96.1 FL (ref 79–97)
METHADONE UR QL SCN: NEGATIVE
MONOCYTES # BLD AUTO: 1.02 10*3/MM3 (ref 0.1–0.9)
MONOCYTES NFR BLD AUTO: 6.8 % (ref 5–12)
MRSA DNA SPEC QL NAA+PROBE: NORMAL
NEUTROPHILS NFR BLD AUTO: 10.1 10*3/MM3 (ref 1.7–7)
NEUTROPHILS NFR BLD AUTO: 66.9 % (ref 42.7–76)
NRBC BLD AUTO-RTO: 0 /100 WBC (ref 0–0.2)
OPIATES UR QL: POSITIVE
OXYCODONE UR QL SCN: POSITIVE
PLATELET # BLD AUTO: 333 10*3/MM3 (ref 140–450)
PMV BLD AUTO: 9.7 FL (ref 6–12)
POTASSIUM SERPL-SCNC: 4 MMOL/L (ref 3.5–5.2)
RBC # BLD AUTO: 3.81 10*6/MM3 (ref 3.77–5.28)
SODIUM SERPL-SCNC: 134 MMOL/L (ref 136–145)
WBC NRBC COR # BLD AUTO: 15.1 10*3/MM3 (ref 3.4–10.8)

## 2024-09-16 PROCEDURE — 80307 DRUG TEST PRSMV CHEM ANLYZR: CPT | Performed by: INTERNAL MEDICINE

## 2024-09-16 PROCEDURE — 25010000002 ONDANSETRON PER 1 MG: Performed by: STUDENT IN AN ORGANIZED HEALTH CARE EDUCATION/TRAINING PROGRAM

## 2024-09-16 PROCEDURE — 25810000003 SODIUM CHLORIDE 0.9 % SOLUTION: Performed by: STUDENT IN AN ORGANIZED HEALTH CARE EDUCATION/TRAINING PROGRAM

## 2024-09-16 PROCEDURE — 25010000002 HYDROMORPHONE 1 MG/ML SOLUTION: Performed by: STUDENT IN AN ORGANIZED HEALTH CARE EDUCATION/TRAINING PROGRAM

## 2024-09-16 PROCEDURE — 25010000002 KETOROLAC TROMETHAMINE PER 15 MG: Performed by: INTERNAL MEDICINE

## 2024-09-16 PROCEDURE — 85025 COMPLETE CBC W/AUTO DIFF WBC: CPT | Performed by: STUDENT IN AN ORGANIZED HEALTH CARE EDUCATION/TRAINING PROGRAM

## 2024-09-16 PROCEDURE — 25010000002 AMPICILLIN-SULBACTAM PER 1.5 G: Performed by: STUDENT IN AN ORGANIZED HEALTH CARE EDUCATION/TRAINING PROGRAM

## 2024-09-16 PROCEDURE — 25010000002 VANCOMYCIN 5 G RECONSTITUTED SOLUTION: Performed by: STUDENT IN AN ORGANIZED HEALTH CARE EDUCATION/TRAINING PROGRAM

## 2024-09-16 PROCEDURE — 80048 BASIC METABOLIC PNL TOTAL CA: CPT | Performed by: STUDENT IN AN ORGANIZED HEALTH CARE EDUCATION/TRAINING PROGRAM

## 2024-09-16 PROCEDURE — 96376 TX/PRO/DX INJ SAME DRUG ADON: CPT

## 2024-09-16 PROCEDURE — 99233 SBSQ HOSP IP/OBS HIGH 50: CPT | Performed by: INTERNAL MEDICINE

## 2024-09-16 PROCEDURE — 36415 COLL VENOUS BLD VENIPUNCTURE: CPT | Performed by: STUDENT IN AN ORGANIZED HEALTH CARE EDUCATION/TRAINING PROGRAM

## 2024-09-16 PROCEDURE — G0378 HOSPITAL OBSERVATION PER HR: HCPCS

## 2024-09-16 PROCEDURE — 25010000002 MORPHINE PER 10 MG: Performed by: STUDENT IN AN ORGANIZED HEALTH CARE EDUCATION/TRAINING PROGRAM

## 2024-09-16 PROCEDURE — 87641 MR-STAPH DNA AMP PROBE: CPT | Performed by: STUDENT IN AN ORGANIZED HEALTH CARE EDUCATION/TRAINING PROGRAM

## 2024-09-16 PROCEDURE — 96375 TX/PRO/DX INJ NEW DRUG ADDON: CPT

## 2024-09-16 RX ORDER — FAMOTIDINE 20 MG/1
20 TABLET, FILM COATED ORAL NIGHTLY
Status: DISCONTINUED | OUTPATIENT
Start: 2024-09-16 | End: 2024-09-18 | Stop reason: HOSPADM

## 2024-09-16 RX ORDER — ONDANSETRON 4 MG/1
4 TABLET, ORALLY DISINTEGRATING ORAL EVERY 8 HOURS PRN
Status: DISCONTINUED | OUTPATIENT
Start: 2024-09-16 | End: 2024-09-18 | Stop reason: HOSPADM

## 2024-09-16 RX ORDER — OXYCODONE HYDROCHLORIDE 5 MG/1
10 TABLET ORAL EVERY 8 HOURS PRN
Status: DISCONTINUED | OUTPATIENT
Start: 2024-09-16 | End: 2024-09-18 | Stop reason: HOSPADM

## 2024-09-16 RX ORDER — KETOROLAC TROMETHAMINE 30 MG/ML
30 INJECTION, SOLUTION INTRAMUSCULAR; INTRAVENOUS EVERY 6 HOURS PRN
Status: DISCONTINUED | OUTPATIENT
Start: 2024-09-16 | End: 2024-09-18 | Stop reason: HOSPADM

## 2024-09-16 RX ADMIN — VANCOMYCIN HYDROCHLORIDE 1250 MG: 5 INJECTION, POWDER, LYOPHILIZED, FOR SOLUTION INTRAVENOUS at 09:57

## 2024-09-16 RX ADMIN — HYDROMORPHONE HYDROCHLORIDE 1 MG: 1 INJECTION, SOLUTION INTRAMUSCULAR; INTRAVENOUS; SUBCUTANEOUS at 05:33

## 2024-09-16 RX ADMIN — MORPHINE SULFATE 2 MG: 2 INJECTION, SOLUTION INTRAMUSCULAR; INTRAVENOUS at 01:53

## 2024-09-16 RX ADMIN — AMPICILLIN SODIUM, SULBACTAM SODIUM 3 G: 2; 1 INJECTION, POWDER, FOR SOLUTION INTRAMUSCULAR; INTRAVENOUS at 08:25

## 2024-09-16 RX ADMIN — OXYCODONE HYDROCHLORIDE 10 MG: 5 TABLET ORAL at 18:19

## 2024-09-16 RX ADMIN — MORPHINE SULFATE 2 MG: 2 INJECTION, SOLUTION INTRAMUSCULAR; INTRAVENOUS at 08:27

## 2024-09-16 RX ADMIN — AMPICILLIN SODIUM, SULBACTAM SODIUM 3 G: 2; 1 INJECTION, POWDER, FOR SOLUTION INTRAMUSCULAR; INTRAVENOUS at 01:53

## 2024-09-16 RX ADMIN — MORPHINE SULFATE 2 MG: 2 INJECTION, SOLUTION INTRAMUSCULAR; INTRAVENOUS at 12:45

## 2024-09-16 RX ADMIN — AMPICILLIN SODIUM, SULBACTAM SODIUM 3 G: 2; 1 INJECTION, POWDER, FOR SOLUTION INTRAMUSCULAR; INTRAVENOUS at 15:08

## 2024-09-16 RX ADMIN — Medication 10 ML: at 20:35

## 2024-09-16 RX ADMIN — FAMOTIDINE 20 MG: 20 TABLET ORAL at 20:33

## 2024-09-16 RX ADMIN — MORPHINE SULFATE 2 MG: 2 INJECTION, SOLUTION INTRAMUSCULAR; INTRAVENOUS at 20:35

## 2024-09-16 RX ADMIN — OXYCODONE HYDROCHLORIDE 10 MG: 5 TABLET ORAL at 10:01

## 2024-09-16 RX ADMIN — OXYCODONE HYDROCHLORIDE 5 MG: 5 TABLET ORAL at 00:23

## 2024-09-16 RX ADMIN — ONDANSETRON 4 MG: 2 INJECTION INTRAMUSCULAR; INTRAVENOUS at 08:29

## 2024-09-16 RX ADMIN — AMPICILLIN SODIUM, SULBACTAM SODIUM 3 G: 2; 1 INJECTION, POWDER, FOR SOLUTION INTRAMUSCULAR; INTRAVENOUS at 20:33

## 2024-09-16 RX ADMIN — KETOROLAC TROMETHAMINE 30 MG: 30 INJECTION, SOLUTION INTRAMUSCULAR; INTRAVENOUS at 15:23

## 2024-09-16 RX ADMIN — VANCOMYCIN HYDROCHLORIDE 1250 MG: 5 INJECTION, POWDER, LYOPHILIZED, FOR SOLUTION INTRAVENOUS at 22:36

## 2024-09-17 ENCOUNTER — APPOINTMENT (OUTPATIENT)
Facility: HOSPITAL | Age: 44
End: 2024-09-17
Payer: MEDICAID

## 2024-09-17 LAB
ALBUMIN SERPL-MCNC: 2.9 G/DL (ref 3.5–5.2)
ANION GAP SERPL CALCULATED.3IONS-SCNC: 6.3 MMOL/L (ref 5–15)
BASOPHILS # BLD AUTO: 0.09 10*3/MM3 (ref 0–0.2)
BASOPHILS NFR BLD AUTO: 0.8 % (ref 0–1.5)
BH CV UPPER VENOUS LEFT AXILLARY AUGMENT: NORMAL
BH CV UPPER VENOUS LEFT AXILLARY COMPRESS: NORMAL
BH CV UPPER VENOUS LEFT AXILLARY PHASIC: NORMAL
BH CV UPPER VENOUS LEFT AXILLARY SPONT: NORMAL
BH CV UPPER VENOUS LEFT BASILIC FOREARM COMPRESS: NORMAL
BH CV UPPER VENOUS LEFT BASILIC UPPER COMPRESS: NORMAL
BH CV UPPER VENOUS LEFT BRACHIAL COMPRESS: NORMAL
BH CV UPPER VENOUS LEFT CEPHALIC FOREARM COMPRESS: NORMAL
BH CV UPPER VENOUS LEFT CEPHALIC UPPER COMPRESS: NORMAL
BH CV UPPER VENOUS LEFT INTERNAL JUGULAR AUGMENT: NORMAL
BH CV UPPER VENOUS LEFT INTERNAL JUGULAR COMPRESS: NORMAL
BH CV UPPER VENOUS LEFT INTERNAL JUGULAR PHASIC: NORMAL
BH CV UPPER VENOUS LEFT INTERNAL JUGULAR SPONT: NORMAL
BH CV UPPER VENOUS LEFT RADIAL COMPRESS: NORMAL
BH CV UPPER VENOUS LEFT SUBCLAVIAN AUGMENT: NORMAL
BH CV UPPER VENOUS LEFT SUBCLAVIAN COMPRESS: NORMAL
BH CV UPPER VENOUS LEFT SUBCLAVIAN PHASIC: NORMAL
BH CV UPPER VENOUS LEFT SUBCLAVIAN SPONT: NORMAL
BH CV UPPER VENOUS LEFT ULNAR COMPRESS: NORMAL
BH CV UPPER VENOUS RIGHT INTERNAL JUGULAR AUGMENT: NORMAL
BH CV UPPER VENOUS RIGHT INTERNAL JUGULAR COMPRESS: NORMAL
BH CV UPPER VENOUS RIGHT INTERNAL JUGULAR PHASIC: NORMAL
BH CV UPPER VENOUS RIGHT INTERNAL JUGULAR SPONT: NORMAL
BH CV UPPER VENOUS RIGHT SUBCLAVIAN AUGMENT: NORMAL
BH CV UPPER VENOUS RIGHT SUBCLAVIAN COMPRESS: NORMAL
BH CV UPPER VENOUS RIGHT SUBCLAVIAN PHASIC: NORMAL
BH CV UPPER VENOUS RIGHT SUBCLAVIAN SPONT: NORMAL
BH CV VAS PRELIMINARY FINDINGS SCRIPTING: 1
BUN SERPL-MCNC: 11 MG/DL (ref 6–20)
BUN/CREAT SERPL: 19.6 (ref 7–25)
CALCIUM SPEC-SCNC: 7.9 MG/DL (ref 8.6–10.5)
CHLORIDE SERPL-SCNC: 106 MMOL/L (ref 98–107)
CO2 SERPL-SCNC: 24.7 MMOL/L (ref 22–29)
CREAT SERPL-MCNC: 0.56 MG/DL (ref 0.57–1)
DEPRECATED RDW RBC AUTO: 43.4 FL (ref 37–54)
EGFRCR SERPLBLD CKD-EPI 2021: 114.1 ML/MIN/1.73
EOSINOPHIL # BLD AUTO: 0.57 10*3/MM3 (ref 0–0.4)
EOSINOPHIL NFR BLD AUTO: 4.8 % (ref 0.3–6.2)
ERYTHROCYTE [DISTWIDTH] IN BLOOD BY AUTOMATED COUNT: 12.5 % (ref 12.3–15.4)
GLUCOSE SERPL-MCNC: 93 MG/DL (ref 65–99)
HCT VFR BLD AUTO: 33.5 % (ref 34–46.6)
HGB BLD-MCNC: 10.9 G/DL (ref 12–15.9)
IMM GRANULOCYTES # BLD AUTO: 0.04 10*3/MM3 (ref 0–0.05)
IMM GRANULOCYTES NFR BLD AUTO: 0.3 % (ref 0–0.5)
LYMPHOCYTES # BLD AUTO: 3.45 10*3/MM3 (ref 0.7–3.1)
LYMPHOCYTES NFR BLD AUTO: 29.3 % (ref 19.6–45.3)
MCH RBC QN AUTO: 30.9 PG (ref 26.6–33)
MCHC RBC AUTO-ENTMCNC: 32.5 G/DL (ref 31.5–35.7)
MCV RBC AUTO: 94.9 FL (ref 79–97)
MONOCYTES # BLD AUTO: 0.82 10*3/MM3 (ref 0.1–0.9)
MONOCYTES NFR BLD AUTO: 7 % (ref 5–12)
NEUTROPHILS NFR BLD AUTO: 57.8 % (ref 42.7–76)
NEUTROPHILS NFR BLD AUTO: 6.79 10*3/MM3 (ref 1.7–7)
NRBC BLD AUTO-RTO: 0 /100 WBC (ref 0–0.2)
PHOSPHATE SERPL-MCNC: 3.4 MG/DL (ref 2.5–4.5)
PLATELET # BLD AUTO: 381 10*3/MM3 (ref 140–450)
PMV BLD AUTO: 9.8 FL (ref 6–12)
POTASSIUM SERPL-SCNC: 4.2 MMOL/L (ref 3.5–5.2)
RBC # BLD AUTO: 3.53 10*6/MM3 (ref 3.77–5.28)
SODIUM SERPL-SCNC: 137 MMOL/L (ref 136–145)
VANCOMYCIN SERPL-MCNC: 11.99 MCG/ML (ref 5–40)
WBC NRBC COR # BLD AUTO: 11.76 10*3/MM3 (ref 3.4–10.8)

## 2024-09-17 PROCEDURE — 80202 ASSAY OF VANCOMYCIN: CPT | Performed by: STUDENT IN AN ORGANIZED HEALTH CARE EDUCATION/TRAINING PROGRAM

## 2024-09-17 PROCEDURE — 96366 THER/PROPH/DIAG IV INF ADDON: CPT

## 2024-09-17 PROCEDURE — 93971 EXTREMITY STUDY: CPT

## 2024-09-17 PROCEDURE — 25810000003 SODIUM CHLORIDE 0.9 % SOLUTION: Performed by: STUDENT IN AN ORGANIZED HEALTH CARE EDUCATION/TRAINING PROGRAM

## 2024-09-17 PROCEDURE — 85025 COMPLETE CBC W/AUTO DIFF WBC: CPT | Performed by: INTERNAL MEDICINE

## 2024-09-17 PROCEDURE — G0378 HOSPITAL OBSERVATION PER HR: HCPCS

## 2024-09-17 PROCEDURE — 25010000002 KETOROLAC TROMETHAMINE PER 15 MG: Performed by: INTERNAL MEDICINE

## 2024-09-17 PROCEDURE — 96367 TX/PROPH/DG ADDL SEQ IV INF: CPT

## 2024-09-17 PROCEDURE — 25010000002 AMPICILLIN-SULBACTAM PER 1.5 G: Performed by: STUDENT IN AN ORGANIZED HEALTH CARE EDUCATION/TRAINING PROGRAM

## 2024-09-17 PROCEDURE — 96376 TX/PRO/DX INJ SAME DRUG ADON: CPT

## 2024-09-17 PROCEDURE — 99232 SBSQ HOSP IP/OBS MODERATE 35: CPT | Performed by: INTERNAL MEDICINE

## 2024-09-17 PROCEDURE — 93971 EXTREMITY STUDY: CPT | Performed by: SURGERY

## 2024-09-17 PROCEDURE — 80069 RENAL FUNCTION PANEL: CPT | Performed by: INTERNAL MEDICINE

## 2024-09-17 PROCEDURE — 25010000002 VANCOMYCIN 5 G RECONSTITUTED SOLUTION: Performed by: STUDENT IN AN ORGANIZED HEALTH CARE EDUCATION/TRAINING PROGRAM

## 2024-09-17 RX ORDER — NICOTINE 21 MG/24HR
1 PATCH, TRANSDERMAL 24 HOURS TRANSDERMAL
Status: DISCONTINUED | OUTPATIENT
Start: 2024-09-17 | End: 2024-09-18 | Stop reason: HOSPADM

## 2024-09-17 RX ORDER — VANCOMYCIN/0.9 % SOD CHLORIDE 1.5G/250ML
1500 PLASTIC BAG, INJECTION (ML) INTRAVENOUS EVERY 12 HOURS
Status: DISCONTINUED | OUTPATIENT
Start: 2024-09-17 | End: 2024-09-18 | Stop reason: HOSPADM

## 2024-09-17 RX ORDER — POLYETHYLENE GLYCOL 3350 17 G
2 POWDER IN PACKET (EA) ORAL
Status: DISCONTINUED | OUTPATIENT
Start: 2024-09-17 | End: 2024-09-18 | Stop reason: HOSPADM

## 2024-09-17 RX ADMIN — KETOROLAC TROMETHAMINE 30 MG: 30 INJECTION, SOLUTION INTRAMUSCULAR; INTRAVENOUS at 05:21

## 2024-09-17 RX ADMIN — OXYCODONE HYDROCHLORIDE 10 MG: 5 TABLET ORAL at 17:36

## 2024-09-17 RX ADMIN — FAMOTIDINE 20 MG: 20 TABLET ORAL at 21:15

## 2024-09-17 RX ADMIN — AMPICILLIN SODIUM, SULBACTAM SODIUM 3 G: 2; 1 INJECTION, POWDER, FOR SOLUTION INTRAMUSCULAR; INTRAVENOUS at 08:42

## 2024-09-17 RX ADMIN — OXYCODONE HYDROCHLORIDE 10 MG: 5 TABLET ORAL at 03:00

## 2024-09-17 RX ADMIN — AMPICILLIN SODIUM, SULBACTAM SODIUM 3 G: 2; 1 INJECTION, POWDER, FOR SOLUTION INTRAMUSCULAR; INTRAVENOUS at 03:01

## 2024-09-17 RX ADMIN — VANCOMYCIN HYDROCHLORIDE 1500 MG: 5 INJECTION, POWDER, LYOPHILIZED, FOR SOLUTION INTRAVENOUS at 10:32

## 2024-09-17 RX ADMIN — VANCOMYCIN HYDROCHLORIDE 1500 MG: 5 INJECTION, POWDER, LYOPHILIZED, FOR SOLUTION INTRAVENOUS at 22:15

## 2024-09-17 RX ADMIN — AMPICILLIN SODIUM, SULBACTAM SODIUM 3 G: 2; 1 INJECTION, POWDER, FOR SOLUTION INTRAMUSCULAR; INTRAVENOUS at 21:15

## 2024-09-17 RX ADMIN — AMPICILLIN SODIUM, SULBACTAM SODIUM 3 G: 2; 1 INJECTION, POWDER, FOR SOLUTION INTRAMUSCULAR; INTRAVENOUS at 14:43

## 2024-09-17 RX ADMIN — KETOROLAC TROMETHAMINE 30 MG: 30 INJECTION, SOLUTION INTRAMUSCULAR; INTRAVENOUS at 21:15

## 2024-09-17 RX ADMIN — NICOTINE 1 PATCH: 21 PATCH, EXTENDED RELEASE TRANSDERMAL at 17:33

## 2024-09-17 RX ADMIN — Medication 10 ML: at 21:15

## 2024-09-18 ENCOUNTER — APPOINTMENT (OUTPATIENT)
Dept: CT IMAGING | Facility: HOSPITAL | Age: 44
End: 2024-09-18
Payer: MEDICAID

## 2024-09-18 ENCOUNTER — READMISSION MANAGEMENT (OUTPATIENT)
Dept: CALL CENTER | Facility: HOSPITAL | Age: 44
End: 2024-09-18
Payer: MEDICAID

## 2024-09-18 VITALS
TEMPERATURE: 97.9 F | DIASTOLIC BLOOD PRESSURE: 78 MMHG | OXYGEN SATURATION: 100 % | SYSTOLIC BLOOD PRESSURE: 145 MMHG | HEIGHT: 63 IN | RESPIRATION RATE: 18 BRPM | WEIGHT: 156.53 LBS | HEART RATE: 64 BPM | BODY MASS INDEX: 27.73 KG/M2

## 2024-09-18 PROBLEM — L03.90 CELLULITIS: Status: RESOLVED | Noted: 2024-09-15 | Resolved: 2024-09-18

## 2024-09-18 LAB
ALBUMIN SERPL-MCNC: 2.7 G/DL (ref 3.5–5.2)
ANION GAP SERPL CALCULATED.3IONS-SCNC: 5 MMOL/L (ref 5–15)
BASOPHILS # BLD AUTO: 0.08 10*3/MM3 (ref 0–0.2)
BASOPHILS NFR BLD AUTO: 0.8 % (ref 0–1.5)
BUN SERPL-MCNC: 13 MG/DL (ref 6–20)
BUN/CREAT SERPL: 24.1 (ref 7–25)
CALCIUM SPEC-SCNC: 7.8 MG/DL (ref 8.6–10.5)
CHLORIDE SERPL-SCNC: 107 MMOL/L (ref 98–107)
CO2 SERPL-SCNC: 26 MMOL/L (ref 22–29)
CREAT SERPL-MCNC: 0.54 MG/DL (ref 0.57–1)
DEPRECATED RDW RBC AUTO: 42 FL (ref 37–54)
EGFRCR SERPLBLD CKD-EPI 2021: 115.2 ML/MIN/1.73
EOSINOPHIL # BLD AUTO: 0.53 10*3/MM3 (ref 0–0.4)
EOSINOPHIL NFR BLD AUTO: 5.2 % (ref 0.3–6.2)
ERYTHROCYTE [DISTWIDTH] IN BLOOD BY AUTOMATED COUNT: 12 % (ref 12.3–15.4)
GLUCOSE SERPL-MCNC: 94 MG/DL (ref 65–99)
HCT VFR BLD AUTO: 34.1 % (ref 34–46.6)
HGB BLD-MCNC: 10.8 G/DL (ref 12–15.9)
IMM GRANULOCYTES # BLD AUTO: 0.04 10*3/MM3 (ref 0–0.05)
IMM GRANULOCYTES NFR BLD AUTO: 0.4 % (ref 0–0.5)
LYMPHOCYTES # BLD AUTO: 3.2 10*3/MM3 (ref 0.7–3.1)
LYMPHOCYTES NFR BLD AUTO: 31.7 % (ref 19.6–45.3)
MCH RBC QN AUTO: 29.8 PG (ref 26.6–33)
MCHC RBC AUTO-ENTMCNC: 31.7 G/DL (ref 31.5–35.7)
MCV RBC AUTO: 93.9 FL (ref 79–97)
MONOCYTES # BLD AUTO: 0.64 10*3/MM3 (ref 0.1–0.9)
MONOCYTES NFR BLD AUTO: 6.3 % (ref 5–12)
NEUTROPHILS NFR BLD AUTO: 5.62 10*3/MM3 (ref 1.7–7)
NEUTROPHILS NFR BLD AUTO: 55.6 % (ref 42.7–76)
NRBC BLD AUTO-RTO: 0 /100 WBC (ref 0–0.2)
PHOSPHATE SERPL-MCNC: 3.3 MG/DL (ref 2.5–4.5)
PLATELET # BLD AUTO: 382 10*3/MM3 (ref 140–450)
PMV BLD AUTO: 9.3 FL (ref 6–12)
POTASSIUM SERPL-SCNC: 4 MMOL/L (ref 3.5–5.2)
RBC # BLD AUTO: 3.63 10*6/MM3 (ref 3.77–5.28)
SODIUM SERPL-SCNC: 138 MMOL/L (ref 136–145)
WBC NRBC COR # BLD AUTO: 10.11 10*3/MM3 (ref 3.4–10.8)

## 2024-09-18 PROCEDURE — 25810000003 SODIUM CHLORIDE 0.9 % SOLUTION: Performed by: STUDENT IN AN ORGANIZED HEALTH CARE EDUCATION/TRAINING PROGRAM

## 2024-09-18 PROCEDURE — 25010000002 MORPHINE PER 10 MG: Performed by: STUDENT IN AN ORGANIZED HEALTH CARE EDUCATION/TRAINING PROGRAM

## 2024-09-18 PROCEDURE — G0378 HOSPITAL OBSERVATION PER HR: HCPCS

## 2024-09-18 PROCEDURE — 96376 TX/PRO/DX INJ SAME DRUG ADON: CPT

## 2024-09-18 PROCEDURE — 25010000002 VANCOMYCIN 5 G RECONSTITUTED SOLUTION: Performed by: STUDENT IN AN ORGANIZED HEALTH CARE EDUCATION/TRAINING PROGRAM

## 2024-09-18 PROCEDURE — 80069 RENAL FUNCTION PANEL: CPT | Performed by: INTERNAL MEDICINE

## 2024-09-18 PROCEDURE — 99239 HOSP IP/OBS DSCHRG MGMT >30: CPT | Performed by: INTERNAL MEDICINE

## 2024-09-18 PROCEDURE — 85025 COMPLETE CBC W/AUTO DIFF WBC: CPT | Performed by: INTERNAL MEDICINE

## 2024-09-18 PROCEDURE — 25010000002 AMPICILLIN-SULBACTAM PER 1.5 G: Performed by: STUDENT IN AN ORGANIZED HEALTH CARE EDUCATION/TRAINING PROGRAM

## 2024-09-18 PROCEDURE — 25010000002 KETOROLAC TROMETHAMINE PER 15 MG: Performed by: INTERNAL MEDICINE

## 2024-09-18 PROCEDURE — 96366 THER/PROPH/DIAG IV INF ADDON: CPT

## 2024-09-18 RX ORDER — CEPHALEXIN 500 MG/1
500 CAPSULE ORAL 4 TIMES DAILY
Qty: 28 CAPSULE | Refills: 0 | Status: SHIPPED | OUTPATIENT
Start: 2024-09-18 | End: 2024-09-25

## 2024-09-18 RX ORDER — KETOROLAC TROMETHAMINE 10 MG/1
10 TABLET, FILM COATED ORAL EVERY 6 HOURS PRN
Qty: 12 TABLET | Refills: 0 | Status: SHIPPED | OUTPATIENT
Start: 2024-09-18 | End: 2024-09-21

## 2024-09-18 RX ORDER — OXYCODONE HYDROCHLORIDE 5 MG/1
5 TABLET ORAL EVERY 6 HOURS PRN
Status: DISCONTINUED | OUTPATIENT
Start: 2024-09-18 | End: 2024-09-18 | Stop reason: HOSPADM

## 2024-09-18 RX ORDER — NICOTINE 21 MG/24HR
1 PATCH, TRANSDERMAL 24 HOURS TRANSDERMAL
Qty: 14 PATCH | Refills: 0 | Status: SHIPPED | OUTPATIENT
Start: 2024-09-19 | End: 2024-10-03

## 2024-09-18 RX ORDER — NICOTINE 21 MG/24HR
1 PATCH, TRANSDERMAL 24 HOURS TRANSDERMAL
Qty: 14 PATCH | Refills: 0 | Status: SHIPPED | OUTPATIENT
Start: 2024-09-19 | End: 2024-09-18

## 2024-09-18 RX ADMIN — AMPICILLIN SODIUM, SULBACTAM SODIUM 3 G: 2; 1 INJECTION, POWDER, FOR SOLUTION INTRAMUSCULAR; INTRAVENOUS at 07:41

## 2024-09-18 RX ADMIN — KETOROLAC TROMETHAMINE 30 MG: 30 INJECTION, SOLUTION INTRAMUSCULAR; INTRAVENOUS at 07:39

## 2024-09-18 RX ADMIN — NICOTINE 1 PATCH: 21 PATCH, EXTENDED RELEASE TRANSDERMAL at 08:35

## 2024-09-18 RX ADMIN — AMPICILLIN SODIUM, SULBACTAM SODIUM 3 G: 2; 1 INJECTION, POWDER, FOR SOLUTION INTRAMUSCULAR; INTRAVENOUS at 02:24

## 2024-09-18 RX ADMIN — Medication 10 ML: at 08:35

## 2024-09-18 RX ADMIN — MORPHINE SULFATE 2 MG: 2 INJECTION, SOLUTION INTRAMUSCULAR; INTRAVENOUS at 09:06

## 2024-09-18 RX ADMIN — AMPICILLIN SODIUM, SULBACTAM SODIUM 3 G: 2; 1 INJECTION, POWDER, FOR SOLUTION INTRAMUSCULAR; INTRAVENOUS at 15:30

## 2024-09-18 RX ADMIN — OXYCODONE HYDROCHLORIDE 10 MG: 5 TABLET ORAL at 15:30

## 2024-09-18 RX ADMIN — OXYCODONE HYDROCHLORIDE 10 MG: 5 TABLET ORAL at 04:25

## 2024-09-18 RX ADMIN — VANCOMYCIN HYDROCHLORIDE 1500 MG: 5 INJECTION, POWDER, LYOPHILIZED, FOR SOLUTION INTRAVENOUS at 09:06

## 2024-09-20 LAB
BACTERIA SPEC AEROBE CULT: NORMAL
BACTERIA SPEC AEROBE CULT: NORMAL

## (undated) DEVICE — MANIP UTER RUMI TP 6.7MMX8CM BLU

## (undated) DEVICE — MEDI-VAC YANKAUER SUCTION HANDLE W/BULBOUS TIP: Brand: CARDINAL HEALTH

## (undated) DEVICE — DRSNG SURESITE WNDW 2.38X2.75

## (undated) DEVICE — OCCL COLPO PNEUMO  STRL

## (undated) DEVICE — BONE MARROW ASPIRATION NEEDLES ASPIRATOR KIT 3-HOLE 4 INCHES NDLE

## (undated) DEVICE — ENDOPATH XCEL BLADELESS TROCARS WITH STABILITY SLEEVES: Brand: ENDOPATH XCEL

## (undated) DEVICE — PK NEURO SPINE 40

## (undated) DEVICE — CANN NASL CO2 TRULINK W/O2 A/

## (undated) DEVICE — OIL CARTRIDGE: Brand: CORE, MAESTRO

## (undated) DEVICE — NDL SPINE 18G 31/2IN PNK

## (undated) DEVICE — ENCORE® LATEX ORTHO SIZE 8, STERILE LATEX POWDER-FREE SURGICAL GLOVE: Brand: ENCORE

## (undated) DEVICE — PAD SANI MAXI W/ADHS SNG WRP 11IN

## (undated) DEVICE — CONMED DISPOSABLE BIPOLAR CABLE, 10' (3.05M): Brand: CONMED

## (undated) DEVICE — OBT BLADLES ENDOWRIST DAVINCI/S 8MM

## (undated) DEVICE — HANDPIECE SET WITH COAXIAL HIGH FLOW TIP AND SUCTION TUBE: Brand: INTERPULSE

## (undated) DEVICE — PK ATS CUST W CARDIOTOMY RESEVOIR

## (undated) DEVICE — Device: Brand: DEFENDO AIR/WATER/SUCTION AND BIOPSY VALVE

## (undated) DEVICE — FRCP BX RADJAW4 NDL 2.8 240CM LG OG BX40

## (undated) DEVICE — CODMAN® SURGICAL PATTIES 1/2" X 3" (1.27CM X 7.62CM): Brand: CODMAN®

## (undated) DEVICE — ADHS SKIN DERMABOND TOP ADVANCED

## (undated) DEVICE — TOTAL TRAY, 16FR 10ML SIL FOLEY, URN: Brand: MEDLINE

## (undated) DEVICE — DRAPE,REIN 53X77,STERILE: Brand: MEDLINE

## (undated) DEVICE — GLV SURG BIOGEL LTX PF 7 1/2

## (undated) DEVICE — GOWN,ECLIPSE,FABRIC-REINFORCED,X-LARGE: Brand: MEDLINE

## (undated) DEVICE — ENDOPATH XCEL UNIVERSAL TROCAR STABLILITY SLEEVES: Brand: ENDOPATH XCEL

## (undated) DEVICE — CATH FOL SIMPLYLATEX SILELAST 16F 17IN

## (undated) DEVICE — SPNG GZ WOVN 4X4IN 12PLY 10/BX STRL

## (undated) DEVICE — PAD,ABDOMINAL,8"X10",ST,LF: Brand: MEDLINE

## (undated) DEVICE — PREMIUM WET SKIN PREP TRAY: Brand: MEDLINE INDUSTRIES, INC.

## (undated) DEVICE — APPL DURAPREP IODOPHOR APL 26ML

## (undated) DEVICE — 6.0MM PRECISION ROUND

## (undated) DEVICE — DISPOSABLE BIPOLAR CABLE 12FT. (3.6M): Brand: KIRWAN

## (undated) DEVICE — DRSNG SURESITE WNDW 4X4.5

## (undated) DEVICE — SPONGE,LAP,12"X12",XR,ST,5/PK,40PK/CS: Brand: MEDLINE

## (undated) DEVICE — DIFFUSER: Brand: CORE, MAESTRO

## (undated) DEVICE — LOU LITHOTOMY ROBOTIC: Brand: MEDLINE INDUSTRIES, INC.

## (undated) DEVICE — SOL ANTISTICK CAUTRY ELECTROLUBE LF

## (undated) DEVICE — SOL NACL 0.9PCT 1000ML

## (undated) DEVICE — ELECTRD BLD EXT EDGE 1P COAT 6.5IN STRL

## (undated) DEVICE — SUT MNCRYL PLS ANTIB UD 4/0 PS2 18IN

## (undated) DEVICE — SOL H2O INJ PL/BG 1000ML STRL

## (undated) DEVICE — BITEBLOCK OMNI BLOC

## (undated) DEVICE — THE TORRENT IRRIGATION SCOPE CONNECTOR IS USED WITH THE TORRENT IRRIGATION TUBING TO PROVIDE IRRIGATION FLUIDS SUCH AS STERILE WATER DURING GASTROINTESTINAL ENDOSCOPIC PROCEDURES WHEN USED IN CONJUNCTION WITH AN IRRIGATION PUMP (OR ELECTROSURGICAL UNIT).: Brand: TORRENT

## (undated) DEVICE — TIP COVER ACCESSORY

## (undated) DEVICE — UNDYED BRAIDED (POLYGLACTIN 910), SYNTHETIC ABSORBABLE SUTURE: Brand: COATED VICRYL

## (undated) DEVICE — GLV SURG BIOGEL LTX PF 6 1/2

## (undated) DEVICE — TUBING, SUCTION, 1/4" X 10', STRAIGHT: Brand: MEDLINE

## (undated) DEVICE — GLV SURG BIOGEL LTX PF 7

## (undated) DEVICE — OCCLUSIVE GAUZE STRIP,3% BISMUTH TRIBROMOPHENATE IN PETROLATUM BLEND: Brand: XEROFORM

## (undated) DEVICE — ANTIBACTERIAL UNDYED BRAIDED (POLYGLACTIN 910), SYNTHETIC ABSORBABLE SUTURE: Brand: COATED VICRYL

## (undated) DEVICE — SPNG GZ 2S 2X2 8PLY STRL PK/2

## (undated) DEVICE — SUT VIC 1 OS8 27IN J699H

## (undated) DEVICE — Device